# Patient Record
Sex: FEMALE | Race: WHITE | URBAN - METROPOLITAN AREA
[De-identification: names, ages, dates, MRNs, and addresses within clinical notes are randomized per-mention and may not be internally consistent; named-entity substitution may affect disease eponyms.]

---

## 2017-01-09 ENCOUNTER — EMERGENCY (EMERGENCY)
Facility: HOSPITAL | Age: 82
LOS: 1 days | Discharge: HOME | End: 2017-01-09
Admitting: EMERGENCY MEDICINE
Payer: MEDICARE

## 2017-01-09 DIAGNOSIS — R42 DIZZINESS AND GIDDINESS: ICD-10-CM

## 2017-01-09 DIAGNOSIS — I10 ESSENTIAL (PRIMARY) HYPERTENSION: ICD-10-CM

## 2017-01-09 DIAGNOSIS — Z79.02 LONG TERM (CURRENT) USE OF ANTITHROMBOTICS/ANTIPLATELETS: ICD-10-CM

## 2017-01-09 DIAGNOSIS — Z88.1 ALLERGY STATUS TO OTHER ANTIBIOTIC AGENTS STATUS: ICD-10-CM

## 2017-01-09 DIAGNOSIS — Z88.0 ALLERGY STATUS TO PENICILLIN: ICD-10-CM

## 2017-01-09 PROCEDURE — 93005 ELECTROCARDIOGRAM TRACING: CPT

## 2017-01-09 PROCEDURE — 99284 EMERGENCY DEPT VISIT MOD MDM: CPT | Mod: 25

## 2017-01-09 PROCEDURE — 70496 CT ANGIOGRAPHY HEAD: CPT

## 2017-01-09 PROCEDURE — 99285 EMERGENCY DEPT VISIT HI MDM: CPT

## 2017-01-09 PROCEDURE — 85610 PROTHROMBIN TIME: CPT

## 2017-01-09 PROCEDURE — 80048 BASIC METABOLIC PNL TOTAL CA: CPT

## 2017-01-09 PROCEDURE — 82550 ASSAY OF CK (CPK): CPT

## 2017-01-09 PROCEDURE — 71010: CPT | Mod: 26

## 2017-01-09 PROCEDURE — 71045 X-RAY EXAM CHEST 1 VIEW: CPT

## 2017-01-09 PROCEDURE — 82553 CREATINE MB FRACTION: CPT

## 2017-01-09 PROCEDURE — 84484 ASSAY OF TROPONIN QUANT: CPT

## 2017-01-09 PROCEDURE — 96360 HYDRATION IV INFUSION INIT: CPT | Mod: XU

## 2017-01-09 PROCEDURE — 70498 CT ANGIOGRAPHY NECK: CPT | Mod: 26

## 2017-01-09 PROCEDURE — 85027 COMPLETE CBC AUTOMATED: CPT

## 2017-01-09 PROCEDURE — 70450 CT HEAD/BRAIN W/O DYE: CPT

## 2017-01-09 PROCEDURE — 70498 CT ANGIOGRAPHY NECK: CPT

## 2017-01-09 PROCEDURE — 93010 ELECTROCARDIOGRAM REPORT: CPT

## 2017-01-09 PROCEDURE — 96361 HYDRATE IV INFUSION ADD-ON: CPT

## 2017-01-09 PROCEDURE — 70496 CT ANGIOGRAPHY HEAD: CPT | Mod: 26

## 2017-01-09 PROCEDURE — 70450 CT HEAD/BRAIN W/O DYE: CPT | Mod: 26,59

## 2017-01-09 PROCEDURE — 85730 THROMBOPLASTIN TIME PARTIAL: CPT

## 2017-01-24 ENCOUNTER — APPOINTMENT (OUTPATIENT)
Dept: MRI IMAGING | Facility: HOSPITAL | Age: 82
End: 2017-01-24

## 2017-08-10 ENCOUNTER — OUTPATIENT (OUTPATIENT)
Dept: OUTPATIENT SERVICES | Facility: HOSPITAL | Age: 82
LOS: 1 days | End: 2017-08-10
Payer: MEDICARE

## 2017-08-10 ENCOUNTER — APPOINTMENT (OUTPATIENT)
Dept: MAMMOGRAPHY | Facility: HOSPITAL | Age: 82
End: 2017-08-10
Payer: MEDICARE

## 2017-08-10 DIAGNOSIS — Z12.31 ENCOUNTER FOR SCREENING MAMMOGRAM FOR MALIGNANT NEOPLASM OF BREAST: ICD-10-CM

## 2017-08-10 PROCEDURE — 77063 BREAST TOMOSYNTHESIS BI: CPT

## 2017-08-10 PROCEDURE — 77063 BREAST TOMOSYNTHESIS BI: CPT | Mod: 26

## 2017-08-10 PROCEDURE — G0202: CPT | Mod: 26

## 2017-08-10 PROCEDURE — 77067 SCR MAMMO BI INCL CAD: CPT

## 2017-08-16 ENCOUNTER — APPOINTMENT (OUTPATIENT)
Dept: MAMMOGRAPHY | Facility: HOSPITAL | Age: 82
End: 2017-08-16
Payer: MEDICARE

## 2017-08-16 ENCOUNTER — APPOINTMENT (OUTPATIENT)
Dept: ULTRASOUND IMAGING | Facility: HOSPITAL | Age: 82
End: 2017-08-16
Payer: MEDICARE

## 2017-08-16 ENCOUNTER — OUTPATIENT (OUTPATIENT)
Dept: OUTPATIENT SERVICES | Facility: HOSPITAL | Age: 82
LOS: 1 days | End: 2017-08-16
Payer: MEDICARE

## 2017-08-16 DIAGNOSIS — R92.2 INCONCLUSIVE MAMMOGRAM: ICD-10-CM

## 2017-08-16 PROCEDURE — G0206: CPT | Mod: 26,RT

## 2017-08-16 PROCEDURE — 77065 DX MAMMO INCL CAD UNI: CPT

## 2017-08-16 PROCEDURE — G0279: CPT | Mod: 26

## 2017-08-16 PROCEDURE — G0279: CPT

## 2018-05-16 ENCOUNTER — OUTPATIENT (OUTPATIENT)
Dept: OUTPATIENT SERVICES | Facility: HOSPITAL | Age: 83
LOS: 1 days | End: 2018-05-16
Payer: MEDICARE

## 2018-05-16 ENCOUNTER — APPOINTMENT (OUTPATIENT)
Dept: RADIOLOGY | Facility: HOSPITAL | Age: 83
End: 2018-05-16

## 2018-05-16 DIAGNOSIS — R05 COUGH: ICD-10-CM

## 2018-05-16 DIAGNOSIS — Z00.8 ENCOUNTER FOR OTHER GENERAL EXAMINATION: ICD-10-CM

## 2018-05-16 DIAGNOSIS — J98.4 OTHER DISORDERS OF LUNG: ICD-10-CM

## 2018-05-16 PROCEDURE — 71046 X-RAY EXAM CHEST 2 VIEWS: CPT | Mod: 26

## 2018-05-16 PROCEDURE — 71046 X-RAY EXAM CHEST 2 VIEWS: CPT

## 2018-07-07 ENCOUNTER — APPOINTMENT (OUTPATIENT)
Dept: ULTRASOUND IMAGING | Facility: HOSPITAL | Age: 83
End: 2018-07-07

## 2018-07-14 ENCOUNTER — APPOINTMENT (OUTPATIENT)
Dept: ULTRASOUND IMAGING | Facility: HOSPITAL | Age: 83
End: 2018-07-14

## 2018-07-26 ENCOUNTER — APPOINTMENT (OUTPATIENT)
Dept: CT IMAGING | Facility: HOSPITAL | Age: 83
End: 2018-07-26

## 2019-09-26 ENCOUNTER — OUTPATIENT (OUTPATIENT)
Dept: OUTPATIENT SERVICES | Facility: HOSPITAL | Age: 84
LOS: 1 days | End: 2019-09-26
Payer: MEDICARE

## 2019-09-26 ENCOUNTER — APPOINTMENT (OUTPATIENT)
Dept: RADIOLOGY | Facility: HOSPITAL | Age: 84
End: 2019-09-26
Payer: MEDICARE

## 2019-09-26 DIAGNOSIS — Z00.8 ENCOUNTER FOR OTHER GENERAL EXAMINATION: ICD-10-CM

## 2019-09-26 PROCEDURE — 71046 X-RAY EXAM CHEST 2 VIEWS: CPT | Mod: 26

## 2019-09-26 PROCEDURE — 71046 X-RAY EXAM CHEST 2 VIEWS: CPT

## 2023-03-27 ENCOUNTER — INPATIENT (INPATIENT)
Facility: HOSPITAL | Age: 88
LOS: 0 days | Discharge: ANOTHER HOSPITAL | DRG: 536 | End: 2023-03-28
Attending: INTERNAL MEDICINE | Admitting: HOSPITALIST
Payer: MEDICARE

## 2023-03-27 VITALS
SYSTOLIC BLOOD PRESSURE: 161 MMHG | HEART RATE: 76 BPM | RESPIRATION RATE: 28 BRPM | OXYGEN SATURATION: 88 % | DIASTOLIC BLOOD PRESSURE: 94 MMHG

## 2023-03-27 DIAGNOSIS — M84.459A PATHOLOGICAL FRACTURE, HIP, UNSPECIFIED, INITIAL ENCOUNTER FOR FRACTURE: ICD-10-CM

## 2023-03-27 DIAGNOSIS — I48.21 PERMANENT ATRIAL FIBRILLATION: ICD-10-CM

## 2023-03-27 DIAGNOSIS — Z01.818 ENCOUNTER FOR OTHER PREPROCEDURAL EXAMINATION: ICD-10-CM

## 2023-03-27 PROCEDURE — 86901 BLOOD TYPING SEROLOGIC RH(D): CPT

## 2023-03-27 PROCEDURE — 70450 CT HEAD/BRAIN W/O DYE: CPT | Mod: 26,MH

## 2023-03-27 PROCEDURE — 72125 CT NECK SPINE W/O DYE: CPT | Mod: 26,MH

## 2023-03-27 PROCEDURE — 85610 PROTHROMBIN TIME: CPT

## 2023-03-27 PROCEDURE — 36415 COLL VENOUS BLD VENIPUNCTURE: CPT

## 2023-03-27 PROCEDURE — 93010 ELECTROCARDIOGRAM REPORT: CPT

## 2023-03-27 PROCEDURE — 93306 TTE W/DOPPLER COMPLETE: CPT

## 2023-03-27 PROCEDURE — 85027 COMPLETE CBC AUTOMATED: CPT

## 2023-03-27 PROCEDURE — 99285 EMERGENCY DEPT VISIT HI MDM: CPT

## 2023-03-27 PROCEDURE — 80048 BASIC METABOLIC PNL TOTAL CA: CPT

## 2023-03-27 PROCEDURE — 73502 X-RAY EXAM HIP UNI 2-3 VIEWS: CPT | Mod: 26,RT

## 2023-03-27 PROCEDURE — 99223 1ST HOSP IP/OBS HIGH 75: CPT

## 2023-03-27 PROCEDURE — 86850 RBC ANTIBODY SCREEN: CPT

## 2023-03-27 PROCEDURE — 86900 BLOOD TYPING SEROLOGIC ABO: CPT

## 2023-03-27 PROCEDURE — 73552 X-RAY EXAM OF FEMUR 2/>: CPT | Mod: 26,RT

## 2023-03-27 PROCEDURE — 71045 X-RAY EXAM CHEST 1 VIEW: CPT | Mod: 26

## 2023-03-27 PROCEDURE — 85730 THROMBOPLASTIN TIME PARTIAL: CPT

## 2023-03-27 NOTE — ED ADULT TRIAGE NOTE - CHIEF COMPLAINT QUOTE
Pt arrives to ED s/p unwitnessed fall at [facility] living. Staff heard patient fall. No LOC. takes plavix daily. complains of right hip pain. trauma alert called. Hx dementia.

## 2023-03-27 NOTE — H&P ADULT - ASSESSMENT
87F w/PMH afib, dementia (AAOx2), presents from AL after fall and inability to ambulate d/t pain of Rt hip.    In ED, found w/ fracture, d/w ortho, planned for surgery. Na 130. Rt hem/hip xray Impacted right subcapital femoral fracture, cxr Gross heart enlargement again noted  O2 88% on RA, now on 2LNC    #Acute hypoxic resp failure- unclear etiology, pt cannot give reliable hx, unable to ask pt if she's sob, although she appears mildly labored when speaking  - in setting of cardiomegaly on cxr, otherwise clear, murmur on exam  - monitor O2 sats and titrate to keep >92%  - check TTE  - check BNP  - albuterol inh  Q6H prn, first dose now  - cardio cs for clearance    #Hyponatremia- mild  - suspect med induced  - check am labs    #Mechanical fall w/ subsequent Rt hip fracture  - ortho cs appreciated- planned for surgery, consented by pt's niece  -bedrest, supportive care  - prn tylenol for pain, pt is comfortable at this time  - will need above work up before can medically clear.     #PPX- lovenox- hold prior to surgery

## 2023-03-27 NOTE — CONSULT NOTE ADULT - SUBJECTIVE AND OBJECTIVE BOX
87y Female demented [facility] presents c/o Right hip fx and inability to ambulate sp mechanical trip and fall. The pt had immediate severe and constant pain to the hip after the fall and was not able to bear weight or ambualte. No alleviating factors including medication or positioning. At baseline uses a Walker . Assisted Living called EMS and were BIBA to the ED where xray imaging was obtained. + HS. Denies numbness/tingling. Denies fever/chills. Denies pain/injury elsewhere. Orthopedics was asked to evaluate and manage the hip fracture. Pt seen and examined by Orthopedic Team: Residents and PAs.    ROS: No CP, no SOB, No night sweats, no fevers or chills, no Numbness or tingling. No pain in the hip prior to the fall.    MEWS Score    Migraine    Atrial Fibrillation    Hypertension    GERD (Gastroesophageal Reflux Disease)    History of Cataract Surgery    Ovarian Cyst    Breast Cyst    FALL    90+    [email or web address]      PAST MEDICAL & SURGICAL HISTORY:  Migraine      Atrial Fibrillation      Hypertension      GERD (Gastroesophageal Reflux Disease)      History of Cataract Surgery  right 2006      Ovarian Cyst      Breast Cyst        MEDICATIONS  (STANDING):    Allergies    penicillin (Anaphylaxis)  Zithromax (Hives)    Intolerances                            14.2   4.21  )-----------( 149      ( 27 Mar 2023 10:38 )             42.7     27 Mar 2023 10:38    130    |  98     |  24     ----------------------------<  149    3.6     |  26     |  0.85     Ca    9.7        27 Mar 2023 10:38    TPro  7.9    /  Alb  3.9    /  TBili  0.7    /  DBili  x      /  AST  28     /  ALT  28     /  AlkPhos  73     27 Mar 2023 10:38    PT/INR - ( 27 Mar 2023 10:38 )   PT: 12.9 sec;   INR: 1.11 ratio         PTT - ( 27 Mar 2023 10:38 )  PTT:28.6 sec  Vital Signs Last 24 Hrs  T(C): --  T(F): --  HR: 76 (03-27-23 @ 11:10) (76 - 76)  BP: 161/94 (03-27-23 @ 11:10) (161/94 - 161/94)  BP(mean): --  RR: 28 (03-27-23 @ 11:10) (28 - 28)  SpO2: 88% (03-27-23 @ 11:10) (88% - 88%)    Orthopedic Imaging: XR demonstrates Right vs Left hip fracture    Physical Exam  General: NAD, Awake but confused, follows some commands for exam  Neurologic: No gross deficits, moving all 4s.  Head: NCAT without abrasions, lacerations, or ecchymosis to head, face, or scalp  Eyes: PERRL  Neck/C-Spine: Painless FAROM. No bony TTP.  T/L Spine: No bony TTP    HIPS and PELVIS: Unable to SLR right Hip.     RIGHT LE: No open skin. Rotated and shortened. No deformities, no signs of trauma, no bony TTP at knee, lower leg, ankle or foot. Full baseline painless ROM at ankle and toes with. Unable to actively SLR. SILT toes 1-5. + DP/PT pulses palpable with brisk cap refill distally. Compartments soft and compressible.     LEFT LE:  No deformities, no signs of trauma, no bony TTP at knee, lower leg, ankle or foot. Full baseline painless ROM at ankle and toes with. Negative log-roll and heel strike. Able to actively SLR. SILT toes 1-5. + DP/PT pulses palpable with brisk cap refill distally. Compartments soft and compressible.     RIGHT UE:  Able to actively raise arm up overhead, flex/extend elbows and wrists. No open skin. Painless baseline AROM Shoulder, elbow, wrist, digits. No obvious deformities or other signs of trauma at shoulder, upper arm, elbow, forearm, wrist or hand.  No bony TTP to shoulder, elbow, wrist or digits.     LEFT UE: Able to actively raise arm up overhead, flex/extend elbows and wrists. No open skin. Painless baseline AROM Shoulder, elbow, wrist, digits. No obvious deformities or other signs of trauma at shoulder, upper arm, elbow, forearm, wrist or hand. No bony TTP  to shoulder, elbow, wrist or digits.     A/P: 87y Female with Right Femoral neck fracture  Pt will need surgery Hip Hemiarthroplasty versus total hip arthroplasty. This was discussed with the patient and/or family  -Surgical consent for prcedure obtained via telephone from family  -EKG on chart  -Keep NPO  -Pain control  -Bedrest  -DVT PE ppx  -F/U Additional imaging/CT  -Admit to medical team  -Medical preop risk stratification  -Nature of the fracture and the plan was discussed/illustrated to the pt and family at bedside  -Discussing above with Ortho Attending   **INCOMPLETE***  **INCOMPLETE***  **INCOMPLETE***  **INCOMPLETE***  
HPI:  87F w/PMH afib, dementia (AAOx2), presents from AL after fall and inability to ambulate d/t pain of Rt hip.  Per notes, pt was alone, but staff heard her fall and immediately came to see her and did not find any LOC.  Pt knows her name and that she's in the hospital, tells me she's here d/t coughing.  When I ask her if she recalls falling, she then states "yes, i think that was yesterday" and endorses that she's been able to walk after that.  Pt is comfortable at this time. She's very hard of hearing and I had to communicate w/ her by writing on paper to explain that she's having surgery for broken hip.  Pt's niece, is NOK, consented to pt's surgery.  Pt's  also has dementia.      In ED, found w/ fracture, d/w ortho, planned for surgery. Na 130. Rt hem/hip xray Impacted right subcapital femoral fracture. cxr Gross heart enlargement again noted  O2 88% on RA, now on 2LNC    consulted for preop clearance.  Pt hard of hearing  history difficult, denies chest pain, dyspnea, reports able to   walk 2 flights of stairs.      PAST MEDICAL AND SURGICAL HISTORY:  PAST MEDICAL & SURGICAL HISTORY:  Migraine      Atrial Fibrillation      Hypertension      GERD (Gastroesophageal Reflux Disease)      History of Cataract Surgery  right 2006      Ovarian Cyst      Breast Cyst          ALLERGIES:  Allergies    penicillin (Anaphylaxis)  Zithromax (Hives)    Intolerances        SOCIAL HISTORY:  Social History:      FAMILY  HISTORY:  FAMILY HISTORY:      MEDICATIONS:  OUTPATIENT:  Home Medications:      INPATIENT:  MEDICATIONS  (STANDING):    MEDICATIONS  (PRN):    MEDICATIONS  (PRN):      REVIEW OF SYSTEMS:  ===============================  ===============================  CONSTITUTIONAL: No weakness, fevers or chills  EYES/ENT: No visual changes;  No vertigo or throat pain   NECK: No pain or stiffness  RESPIRATORY: No cough, wheezing, hemoptysis; No shortness of breath  CARDIOVASCULAR: No chest pain or palpitations  GASTROINTESTINAL: No abdominal or epigastric pain. No nausea, vomiting, or hematemesis;   No diarrhea or constipation. No melena or hematochezia.  GENITOURINARY: No dysuria, frequency or hematuria  NEUROLOGICAL: No numbness or weakness  SKIN: No itching, burning, rashes, or lesions   All other review of systems is negative unless indicated above    Vital Signs Last 24 Hrs  T(C): --  T(F): --  HR: 76 (27 Mar 2023 11:10) (76 - 76)  BP: 161/94 (27 Mar 2023 11:10) (161/94 - 161/94)  BP(mean): --  RR: 28 (27 Mar 2023 11:10) (28 - 28)  SpO2: 88% (27 Mar 2023 11:10) (88% - 88%)    Parameters below as of 27 Mar 2023 11:10  Patient On (Oxygen Delivery Method): room air        I&O's Summary      I&O's Detail      PHYSICAL EXAM:    Constitutional: NAD, awake  HEENT: PERR, EOMI,  No oral cyananosis.  Neck:  supple,  No JVD  Respiratory: Breath sounds are clear bilaterally, No wheezing, rales or rhonchi  Cardiovascular: S1 and S2, regular rate and rhythm, + systolic murmur, no gallops or rubs  Gastrointestinal: Bowel Sounds present, soft, nontender.   Extremities: No peripheral edema. No clubbing or cyanosis.  Vascular: 2+ peripheral pulses  Neurological: A/O x 3, no focal deficits  Musculoskeletal: no calf tenderness.  Skin: No rashes.    ===============================  ===============================  LABS:                         14.2   4.21  )-----------( 149      ( 27 Mar 2023 10:38 )             42.7     27 Mar 2023 10:38    130    |  98     |  24     ----------------------------<  149    3.6     |  26     |  0.85     Ca    9.7        27 Mar 2023 10:38    TPro  7.9    /  Alb  3.9    /  TBili  0.7    /  DBili  x      /  AST  28     /  ALT  28     /  AlkPhos  73     27 Mar 2023 10:38    PT/INR - ( 27 Mar 2023 10:38 )   PT: 12.9 sec;   INR: 1.11 ratio         PTT - ( 27 Mar 2023 10:38 )  PTT:28.6 sec    THYROID STUDIES:  ===============================  ===============================  EKG:  Afib no ischemic changes.      ===============================    Adiel Eid M.D.  Cardiology, [facility]  Cell: [phone]  Office:   [phone] ([facility] Office)  [phone] ([facility] Office)    ===============================

## 2023-03-27 NOTE — CONSULT NOTE ADULT - PROBLEM SELECTOR RECOMMENDATION 9
-systolic murmur on exam.  -poor historian.  -ECG unremarkable    -Echo prior to surgery.  If normal EF & no RWMA  pt is cleared.

## 2023-03-27 NOTE — H&P ADULT - HISTORY OF PRESENT ILLNESS
HPI:  87F w/PMH afib, dementia (AAOx2), presents from AL after fall and inability to ambulate d/t pain of Rt hip.  Per notes, pt was alone, but staff heard her fall and immediately came to see her and did not find any LOC.  Pt knows her name and that she's in the hospital, tells me she's here d/t coughing.  When I ask her if she recalls falling, she then states "yes, i think that was yesterday" and endorses that she's been able to walk after that.  Pt is comfortable at this time. She's very hard of hearing and I had to communicate w/ her by writing on paper to explain that she's having surgery for broken hip.  Pt's niece, is NOK, consented to pt's surgery.  Pt's  also has dementia.      In ED, found w/ fracture, d/w ortho, planned for surgery. Na 130. Rt hem/hip xray Impacted right subcapital femoral fracture. cxr Gross heart enlargement again noted  O2 88% on RA, now on 2LNC    PAST MEDICAL & SURGICAL HISTORY:  Migraine  Atrial Fibrillation  Hypertension  GERD (Gastroesophageal Reflux Disease)  History of Cataract Surgery right 2006  Ovarian Cyst  Breast Cyst      Review of Systems:   cannot assess d/t pt's dementia       Social History:   from [place]    FAMILY HISTORY:  cannot assess d/t pt's dementia       MEDICATIONS  (STANDING):  see written orders    MEDICATIONS  (PRN):  see written order        PHYSICAL EXAM:  Vital Signs Last 24 Hrs  T(C): --  T(F): --  HR: 76 (27 Mar 2023 11:10) (76 - 76)  BP: 161/94 (27 Mar 2023 11:10) (161/94 - 161/94)  BP(mean): --  RR: 28 (27 Mar 2023 11:10) (28 - 28)  SpO2: 88% (27 Mar 2023 11:10) (88% - 88%)  Parameters below as of 27 Mar 2023 11:10  Patient On (Oxygen Delivery Method): 2LNC  GENERAL: NAD, follows commands, can read when I communicate w/ her in writing  HEAD:  Atraumatic, Normocephalic  EYES: EOMI, PERRLA, conjunctiva and sclera clear  ENT: very hard of hearing, no nasal discharge, throat clear, dentition normal  NECK: Supple, No JVD, no LAD, no thyromegaly   CHEST/LUNG: Clear to auscultation bilaterally; No wheeze, respirations unlabored, ++milldy labored w/ conversation  HEART: Regular rate and rhythm; +MIRTHA III/VI  ABDOMEN: Soft, Nontender, Nondistended; Bowel sounds present, no HSM  EXTREMITIES:  2+ Peripheral Pulses, No clubbing, cyanosis, or edema  PSYCH: AAOx2 normal behavior  NEUROLOGY: non-focal, sensory and cn 2-12 intact, speech/language intact  SKIN: No visible rashes or lesions    LABS:                        14.2   4.21  )-----------( 149      ( 27 Mar 2023 10:38 )             42.7     03-27    130<L>  |  98  |  24<H>  ----------------------------<  149<H>  3.6   |  26  |  0.85    Ca    9.7      27 Mar 2023 10:38    TPro  7.9  /  Alb  3.9  /  TBili  0.7  /  DBili  x   /  AST  28  /  ALT  28  /  AlkPhos  73  03-27    PT/INR - ( 27 Mar 2023 10:38 )   PT: 12.9 sec;   INR: 1.11 ratio         PTT - ( 27 Mar 2023 10:38 )  PTT:28.6 sec          RADIOLOGY & ADDITIONAL TESTS:    Imaging Personally Reviewed:  Rt hem/hip xray Impacted right subcapital femoral fracture    cxr Gross heart enlargement again noted    EKG Personally Reviewed:    Consultant(s) Notes Reviewed:    ortho  Care Discussed with Consultants/Other Providers:  ortho resident- planned for surgery, consent from Shannen GOSS

## 2023-03-28 ENCOUNTER — TRANSCRIPTION ENCOUNTER (OUTPATIENT)
Age: 88
End: 2023-03-28

## 2023-03-28 ENCOUNTER — INPATIENT (INPATIENT)
Facility: HOSPITAL | Age: 88
LOS: 2 days | Discharge: NURSING, REHAB OR LONG-TERM CARE | DRG: 481 | End: 2023-03-31
Attending: HOSPITALIST | Admitting: HOSPITALIST
Payer: MEDICARE

## 2023-03-28 VITALS
SYSTOLIC BLOOD PRESSURE: 151 MMHG | DIASTOLIC BLOOD PRESSURE: 81 MMHG | OXYGEN SATURATION: 99 % | HEART RATE: 70 BPM | RESPIRATION RATE: 15 BRPM | HEIGHT: 62 IN | WEIGHT: 127.65 LBS | TEMPERATURE: 98 F

## 2023-03-28 VITALS
DIASTOLIC BLOOD PRESSURE: 73 MMHG | RESPIRATION RATE: 18 BRPM | TEMPERATURE: 98 F | SYSTOLIC BLOOD PRESSURE: 114 MMHG | OXYGEN SATURATION: 94 % | HEART RATE: 68 BPM

## 2023-03-28 DIAGNOSIS — S72.001A FRACTURE OF UNSPECIFIED PART OF NECK OF RIGHT FEMUR, INITIAL ENCOUNTER FOR CLOSED FRACTURE: ICD-10-CM

## 2023-03-28 DIAGNOSIS — I27.20 PULMONARY HYPERTENSION, UNSPECIFIED: ICD-10-CM

## 2023-03-28 DIAGNOSIS — Z29.9 ENCOUNTER FOR PROPHYLACTIC MEASURES, UNSPECIFIED: ICD-10-CM

## 2023-03-28 DIAGNOSIS — Z87.81 PERSONAL HISTORY OF (HEALED) TRAUMATIC FRACTURE: Chronic | ICD-10-CM

## 2023-03-28 DIAGNOSIS — I10 ESSENTIAL (PRIMARY) HYPERTENSION: ICD-10-CM

## 2023-03-28 DIAGNOSIS — I48.20 CHRONIC ATRIAL FIBRILLATION, UNSPECIFIED: ICD-10-CM

## 2023-03-28 DIAGNOSIS — F03.90 UNSPECIFIED DEMENTIA, UNSPECIFIED SEVERITY, WITHOUT BEHAVIORAL DISTURBANCE, PSYCHOTIC DISTURBANCE, MOOD DISTURBANCE, AND ANXIETY: ICD-10-CM

## 2023-03-28 DIAGNOSIS — R91.8 OTHER NONSPECIFIC ABNORMAL FINDING OF LUNG FIELD: ICD-10-CM

## 2023-03-28 LAB
ABO RH CONFIRMATION: SIGNIFICANT CHANGE UP
ANION GAP SERPL CALC-SCNC: 6 MMOL/L — SIGNIFICANT CHANGE UP (ref 5–17)
APTT BLD: 31.7 SEC — SIGNIFICANT CHANGE UP (ref 27.5–35.5)
BLD GP AB SCN SERPL QL: SIGNIFICANT CHANGE UP
BUN SERPL-MCNC: 17 MG/DL — SIGNIFICANT CHANGE UP (ref 7–23)
CALCIUM SERPL-MCNC: 9.4 MG/DL — SIGNIFICANT CHANGE UP (ref 8.5–10.1)
CHLORIDE SERPL-SCNC: 102 MMOL/L — SIGNIFICANT CHANGE UP (ref 96–108)
CO2 SERPL-SCNC: 23 MMOL/L — SIGNIFICANT CHANGE UP (ref 22–31)
CREAT SERPL-MCNC: 0.68 MG/DL — SIGNIFICANT CHANGE UP (ref 0.5–1.3)
EGFR: 85 ML/MIN/1.73M2 — SIGNIFICANT CHANGE UP
GLUCOSE SERPL-MCNC: 129 MG/DL — HIGH (ref 70–99)
HCT VFR BLD CALC: 40.7 % — SIGNIFICANT CHANGE UP (ref 34.5–45)
HGB BLD-MCNC: 13.7 G/DL — SIGNIFICANT CHANGE UP (ref 11.5–15.5)
INR BLD: 1.19 RATIO — HIGH (ref 0.88–1.16)
MCHC RBC-ENTMCNC: 30.2 PG — SIGNIFICANT CHANGE UP (ref 27–34)
MCHC RBC-ENTMCNC: 33.7 GM/DL — SIGNIFICANT CHANGE UP (ref 32–36)
MCV RBC AUTO: 89.8 FL — SIGNIFICANT CHANGE UP (ref 80–100)
PLATELET # BLD AUTO: 123 K/UL — LOW (ref 150–400)
POTASSIUM SERPL-MCNC: 3.5 MMOL/L — SIGNIFICANT CHANGE UP (ref 3.5–5.3)
POTASSIUM SERPL-SCNC: 3.5 MMOL/L — SIGNIFICANT CHANGE UP (ref 3.5–5.3)
PROTHROM AB SERPL-ACNC: 13.8 SEC — HIGH (ref 10.5–13.4)
RBC # BLD: 4.53 M/UL — SIGNIFICANT CHANGE UP (ref 3.8–5.2)
RBC # FLD: 13.7 % — SIGNIFICANT CHANGE UP (ref 10.3–14.5)
SODIUM SERPL-SCNC: 131 MMOL/L — LOW (ref 135–145)
WBC # BLD: 8.35 K/UL — SIGNIFICANT CHANGE UP (ref 3.8–10.5)
WBC # FLD AUTO: 8.35 K/UL — SIGNIFICANT CHANGE UP (ref 3.8–10.5)

## 2023-03-28 PROCEDURE — 99239 HOSP IP/OBS DSCHRG MGMT >30: CPT

## 2023-03-28 PROCEDURE — 99233 SBSQ HOSP IP/OBS HIGH 50: CPT

## 2023-03-28 PROCEDURE — 99223 1ST HOSP IP/OBS HIGH 75: CPT

## 2023-03-28 PROCEDURE — 93306 TTE W/DOPPLER COMPLETE: CPT | Mod: 26

## 2023-03-28 RX ORDER — ENOXAPARIN SODIUM 100 MG/ML
40 INJECTION SUBCUTANEOUS ONCE
Refills: 0 | Status: COMPLETED | OUTPATIENT
Start: 2023-03-28 | End: 2023-03-28

## 2023-03-28 RX ORDER — SERTRALINE 25 MG/1
1 TABLET, FILM COATED ORAL
Qty: 0 | Refills: 0 | DISCHARGE
Start: 2023-03-28

## 2023-03-28 RX ORDER — LOSARTAN POTASSIUM 100 MG/1
100 TABLET, FILM COATED ORAL DAILY
Refills: 0 | Status: DISCONTINUED | OUTPATIENT
Start: 2023-03-29 | End: 2023-03-29

## 2023-03-28 RX ORDER — ACETAMINOPHEN 500 MG
2 TABLET ORAL
Qty: 0 | Refills: 0 | DISCHARGE
Start: 2023-03-28

## 2023-03-28 RX ORDER — LOSARTAN POTASSIUM 100 MG/1
100 TABLET, FILM COATED ORAL DAILY
Refills: 0 | Status: DISCONTINUED | OUTPATIENT
Start: 2023-03-28 | End: 2023-03-28

## 2023-03-28 RX ORDER — ENOXAPARIN SODIUM 100 MG/ML
40 INJECTION SUBCUTANEOUS EVERY 24 HOURS
Refills: 0 | Status: DISCONTINUED | OUTPATIENT
Start: 2023-03-28 | End: 2023-03-28

## 2023-03-28 RX ORDER — ACETAMINOPHEN 500 MG
650 TABLET ORAL EVERY 6 HOURS
Refills: 0 | Status: DISCONTINUED | OUTPATIENT
Start: 2023-03-28 | End: 2023-03-29

## 2023-03-28 RX ORDER — SERTRALINE 25 MG/1
50 TABLET, FILM COATED ORAL DAILY
Refills: 0 | Status: DISCONTINUED | OUTPATIENT
Start: 2023-03-29 | End: 2023-03-29

## 2023-03-28 RX ORDER — IPRATROPIUM/ALBUTEROL SULFATE 18-103MCG
3 AEROSOL WITH ADAPTER (GRAM) INHALATION EVERY 6 HOURS
Refills: 0 | Status: DISCONTINUED | OUTPATIENT
Start: 2023-03-28 | End: 2023-03-29

## 2023-03-28 RX ORDER — ONDANSETRON 8 MG/1
4 TABLET, FILM COATED ORAL EVERY 8 HOURS
Refills: 0 | Status: DISCONTINUED | OUTPATIENT
Start: 2023-03-28 | End: 2023-03-28

## 2023-03-28 RX ORDER — CLOPIDOGREL BISULFATE 75 MG/1
1 TABLET, FILM COATED ORAL
Qty: 0 | Refills: 0 | DISCHARGE
Start: 2023-03-28

## 2023-03-28 RX ORDER — ALBUTEROL 90 UG/1
1 AEROSOL, METERED ORAL
Refills: 0 | Status: DISCONTINUED | OUTPATIENT
Start: 2023-03-28 | End: 2023-03-28

## 2023-03-28 RX ORDER — CLOPIDOGREL BISULFATE 75 MG/1
75 TABLET, FILM COATED ORAL DAILY
Refills: 0 | Status: DISCONTINUED | OUTPATIENT
Start: 2023-03-28 | End: 2023-03-28

## 2023-03-28 RX ORDER — LOSARTAN POTASSIUM 100 MG/1
1 TABLET, FILM COATED ORAL
Qty: 0 | Refills: 0 | DISCHARGE
Start: 2023-03-28

## 2023-03-28 RX ORDER — SERTRALINE 25 MG/1
50 TABLET, FILM COATED ORAL DAILY
Refills: 0 | Status: DISCONTINUED | OUTPATIENT
Start: 2023-03-28 | End: 2023-03-28

## 2023-03-28 RX ORDER — ACETAMINOPHEN 500 MG
650 TABLET ORAL EVERY 6 HOURS
Refills: 0 | Status: DISCONTINUED | OUTPATIENT
Start: 2023-03-28 | End: 2023-03-28

## 2023-03-28 RX ADMIN — ENOXAPARIN SODIUM 40 MILLIGRAM(S): 100 INJECTION SUBCUTANEOUS at 17:32

## 2023-03-28 NOTE — H&P ADULT - NSHPSOURCEINFOTX_GEN_ALL_CORE
History from patient not reliable due to moderate cognitive impairment. History from patient not reliable due to moderate cognitive impairment.  Next of kin, Bernadette Ramiro, [phone] History from patient not reliable due to moderate cognitive impairment.  Next of kin, Bernadette Rubio, [phone] contacted by me with partial history reviewed.

## 2023-03-28 NOTE — H&P ADULT - PROBLEM SELECTOR PLAN 1
Upgraded to telemetry.   Per niece, patient has not been on full AC (unclear if from fall risk?).   Would clarify patient's Plavix Rx in the AM.    Would consider formal Cardiology evaluation in the AM.

## 2023-03-28 NOTE — H&P ADULT - NSICDXPASTMEDICALHX_GEN_ALL_CORE_FT
SUBJECTIVE:                                                            Katty Hayward is a 79 year old female who presents for Preventive Visit.    Sleeping (lack of)- Patient reports she tosses and turns when laying in bed.  She gets up to go to bathroom and can't sleep and does other things until eventually its morning. She relates she falls asleep during the day and sleeps more during the day than at night. When she is in bed at night her legs constantly wiggle and are restless. Patient states this has been going on since winter of last year. Patient is using Mirapex but says it helps minimally. Patient has taken Gabapentin before for her sleeping problems and reports no reactions with it. She relates going to bed around midnight every night but wiggles all night with itchy/restless legs.   Irritable Bowel Syndrome (IBS)- Patient reports having abdominal pain and has experienced diarrhea since winter of last year. She notes she has not tried any medications for her IBS. She notes her stool was pure liquid prior to arriving to the clinic. She gets nervous but doesn't attribute her IBS to her nervousness. Patient relates stools twice a day, mostly after she eats. She has never seen a GI doctor. She denies cramping or pain with her bowel movements. Patient denies fever, chills, or blood in her stool as well.  Memory- Patient reports not being able to remember things frequently. Patient denies getting lost when she is driving her car. Patient also denies depression or falls.    Are you in the first 12 months of your Medicare Part B coverage?  No    Healthy Habits:    Do you get at least three servings of calcium containing foods daily (dairy, green leafy vegetables, etc.)? yes    Amount of exercise or daily activities, outside of work: 2 day(s) per week going to the gym - 20 minutes     Problems taking medications regularly No    Medication side effects: No    Have you had an eye exam in the past two years?  yes    Do you see a dentist twice per year? yes    Do you have sleep apnea, excessive snoring or daytime drowsiness?no    COGNITIVE SCREEN  1) Repeat 3 items (Banana, Sunrise, Chair)    2) Clock draw: ABNORMAL hands in the wrong place   3) 3 item recall: Recalls 2 objects   Results: ABNORMAL clock, 1-2 items recalled: PROBABLE COGNITIVE IMPAIRMENT, **INFORM PROVIDER**    Mini-CogTM Copyright DANTE Mark. Licensed by the author for use in [facility]; reprinted with permission (khurram@[facility]). All rights reserved.        Sleep problems  Irritable bowel   Memory concern     Reviewed and updated as needed this visit by clinical staff  Tobacco  Allergies  Meds  Med Hx  Surg Hx  Fam Hx  Soc Hx        Reviewed and updated as needed this visit by Provider        Social History   Substance Use Topics     Smoking status: Former Smoker     Packs/day: 1.00     Types: Cigarettes     Quit date: 4/22/1996     Smokeless tobacco: Never Used      Comment: STOPPED [id number]     Alcohol use Yes      Comment: SELDOM       The patient does not drink >3 drinks per day nor >7 drinks per week.    Today's PHQ-2 Score:   PHQ-2 ( 1999 Pf[employer]) 6/5/2017 5/14/2014   Q1: Little interest or pleasure in doing things 0 0   Q2: Feeling down, depressed or hopeless 1 0   PHQ-2 Score 1 0       Do you feel safe in your environment - Yes    Do you have a Health Care Directive?: No: Advance care planning was reviewed with patient; patient declined at this time.    Current providers sharing in care for this patient include:   Patient Care Team:  Weiler, Karen, MD as PCP - General (Family Practice)      Hearing impairment: No    Ability to successfully perform activities of daily living: Yes, no assistance needed     Fall risk:  Fallen 2 or more times in the past year?: No  Any fall with injury in the past year?: No    Home safety:  none identified      The following health maintenance items are reviewed in Epic and correct as of  "today:  Health Maintenance   Topic Date Due     PNEUMOCOCCAL (2 of 2 - PCV13) 05/12/2011     MEDICARE ANNUAL WELLNESS VISIT  05/14/2015     WELLNESS VISIT Q1 YR  05/14/2015     DEXA Q3 YR  05/01/2016     COPD ACTION PLAN Q1 YR  05/05/2016     FALL RISK ASSESSMENT  05/13/2016     ADVANCE DIRECTIVE PLANNING Q5 YRS  08/05/2016     LIPID MONITORING Q1 YEAR  05/25/2017     MICROALBUMIN Q1 YEAR  05/25/2017     INFLUENZA VACCINE (SYSTEM ASSIGNED)  09/01/2017     TETANUS IMMUNIZATION (SYSTEM ASSIGNED)  11/04/2018     SPIROMETRY ONETIME  Completed         Pneumonia Vaccine:Adults age 65+ who received Pneumovax (PPSV23) at 65 years or older: Should be given PCV13 > 1 year after their most recent PPSV23     ROS:  Constitutional, HEENT, cardiovascular, pulmonary, gi and gu systems are negative, except as otherwise noted.    This document serves as a record of the services and decisions personally performed and made by Karen Weiler, MD. It was created on her behalf by Drew Boyle, a trained medical scribe. The creation of this document is based on the provider's statements to the medical scribe.  Drew Boyle 1:25 PM June 5, 2017    Problem list, Medication list, Allergies, and Medical/Social/Surgical histories reviewed in [employer] and updated as appropriate.  OBJECTIVE:                                                            /80  Pulse 86  Temp 98.4  F (36.9  C) (Oral)  Ht 1.775 m (5' 9.9\")  Wt 62.6 kg (138 lb)  SpO2 95%  BMI 19.86 kg/m2 Estimated body mass index is 19.86 kg/(m^2) as calculated from the following:    Height as of this encounter: 1.775 m (5' 9.9\").    Weight as of this encounter: 62.6 kg (138 lb).  EXAM:   GENERAL: healthy, alert and no distress  EYES: Eyes grossly normal to inspection, PERRL and conjunctivae and sclerae normal  HENT: ear canals and TM's normal, nose and mouth without ulcers or lesions  NECK: no adenopathy, no asymmetry, masses, or scars and thyroid normal to palpation  RESP: lungs " clear to auscultation - no rales, rhonchi or wheezes  CV: regular rate and rhythm, normal S1 S2, no S3 or S4, no murmur, click or rub, no peripheral edema and peripheral pulses strong  ABDOMEN: soft, nontender, no hepatosplenomegaly, no masses and bowel sounds normal  MS: no gross musculoskeletal defects noted, no edema  SKIN: no suspicious lesions or rashes  NEURO: Normal strength and tone, mentation intact and speech normal  PSYCH: mentation appears normal, affect normal/bright    ASSESSMENT / PLAN:                                                              (Z00.00) Medicare annual wellness visit, subsequent  (primary encounter diagnosis)      (R19.5) Loose stools  Comment: Will check cultures.  Discussed with patient taking imodium before eating meals to help with her IBS.  Plan: Enteric Bacteria and Virus Panel by NATHANIEL Stool,         Clostridium difficile Toxin B PCR        Follow up as needed if symptoms persist or if they worsen.    (Z13.6) CARDIOVASCULAR SCREENING; LDL GOAL LESS THAN 130  Comment: Meds refilled. Discussed with patient symptoms cares.  Plan: Follow up as needed.    (J44.9) Chronic obstructive pulmonary disease, unspecified COPD type (H)  Comment: Meds refilled. Stable.   Plan: Lipid panel reflex to direct LDL, predniSONE         (DELTASONE) 10 MG tablet, levofloxacin         (LEVAQUIN) 500 MG tablet        Follow up as needed.    (R41.3) Memory problem  Comment: Will check labs. May consider Neurology referral for additional testing.   Plan: Vitamin B12, Folate, TSH with free T4 reflex        Follow up as needed if symptoms persist or worsen.    (G25.81) Restless leg syndrome  Comment: Patient desired to discontinue clonazepam and start taking gabapentin for her restless legs and to help her sleep at night without interruption. Also recommended checking iron levels to see if this is causing her legs to be restless.  Plan: CBC with platelets and differential,         Comprehensive metabolic  "panel (BMP + Alb, Alk         Phos, ALT, AST, Total. Bili, TP), Iron and iron        binding capacity, pramipexole (MIRAPEX) 0.25 MG        tablet, gabapentin (NEURONTIN) 300 MG capsule        Follow up as needed if symptoms worsen.    (I10) Essential hypertension with goal blood pressure less than 140/90  Comment: BP under good control today.  Meds refilled.  Plan: losartan-hydrochlorothiazide (HYZAAR) 50-12.5         MG per tablet        Follow up as needed.    (F41.9) Anxiety  Comment: Stable.. Meds refilled.  Plan: citalopram (CELEXA) 20 MG tablet        Follow up as needed    (K21.0) Reflux esophagitis  Comment: Meds refilled.  Plan: omeprazole (PRILOSEC) 20 MG CR capsule        Follow up as needed.    (Z23) Need for vaccination with 13-polyvalent pneumococcal conjugate vaccin  Plan: PNEUMOCOCCAL CONJ VACCINE 13 VALENT IM    End of Life Planning:  Patient currently has an advanced directive: Yes.  Practitioner is supportive of decision.    COUNSELING:  Reviewed preventive health counseling, as reflected in patient instructions       Regular exercise       Healthy diet/nutrition       Dental care       Immunizations    Vaccinated for: Pneumococcal          Estimated body mass index is 19.86 kg/(m^2) as calculated from the following:    Height as of this encounter: 1.775 m (5' 9.9\").    Weight as of this encounter: 62.6 kg (138 lb).  Weight management plan noted, stable and monitoring   reports that she quit smoking about 21 years ago. Her smoking use included Cigarettes. She smoked 1.00 pack per day. She has never used smokeless tobacco.      Appropriate preventive services were discussed with this patient, including applicable screening as appropriate for cardiovascular disease, diabetes, osteopenia/osteoporosis, and glaucoma.  As appropriate for age/gender, discussed screening for colorectal cancer, prostate cancer, breast cancer, and cervical cancer. Checklist reviewing preventive services available has been " given to the patient.    Reviewed patients plan of care and provided an AVS. The Basic Care Plan (routine screening as documented in Health Maintenance) for Katty meets the Care Plan requirement. This Care Plan has been established and reviewed with the Patient.    Counseling Resources:  ATP IV Guidelines  Pooled Cohorts Equation Calculator  Breast Cancer Risk Calculator  FRAX Risk Assessment  ICSI Preventive Guidelines  Dietary Guidelines for Americans, 2010  [employer]'s MyPlate  ASA Prophylaxis  Lung CA Screening    The information in this document, created by the medical scribe for me, accurately reflects the services I personally performed and the decisions made by me. I have reviewed and approved this document for accuracy prior to leaving the patient care area.  June 5, 2017 1:26 PM    Karen Weiler, MD  [facility]   PAST MEDICAL HISTORY:  Atrial Fibrillation     GERD (Gastroesophageal Reflux Disease)     Hypertension     Migraine     Pulmonary hypertension

## 2023-03-28 NOTE — H&P ADULT - REASON FOR ADMISSION
Transferred per orthopaedics to [place] from [facility] S/P presumed mechanical fall and RIGHT hip fracture

## 2023-03-28 NOTE — DISCHARGE NOTE PROVIDER - NSDCMRMEDTOKEN_GEN_ALL_CORE_FT
acetaminophen 325 mg oral tablet: 2 tab(s) orally every 6 hours As needed Temp greater or equal to 38.5C (101.3F)  clopidogrel 75 mg oral tablet: 1 tab(s) orally once a day  losartan 100 mg oral tablet: 1 tab(s) orally once a day  sertraline 50 mg oral tablet: 1 tab(s) orally once a day

## 2023-03-28 NOTE — H&P ADULT - PROBLEM SELECTOR PLAN 4
See above.  Non weight bearing.   I have contacted orthopaedics of patient's transfer from [place].

## 2023-03-28 NOTE — H&P ADULT - NSHPSOCIALHISTORY_GEN_ALL_CORE
Niece is not sure, but no history of primary tobacco use.  NO ethanol.   Moderate cognitive impairment, resident of assisted living with spouse, also with cognitive impairment.

## 2023-03-28 NOTE — H&P ADULT - PROBLEM SELECTOR PLAN 7
Patient received Lovenox at [facility] at 5PM.  Ortho requesting to temporarily HOLD the Lovenox with clarification of OR time.

## 2023-03-28 NOTE — H&P ADULT - HISTORY OF PRESENT ILLNESS
NIGHT HOSPITALIST:    Patient UNKNOWN to me previously, assigned to me at this point via Dr. Davila of orthopaedics to accept transfer for this 88 y/o F--transferred from [facility]--history from patient not reliable and partially obtained from patient's niece (Next of Kin) Bernadette Rubio, [phone]--patient with a history of moderate cognitive impairment resident of an assisted living facility in [facility] who lives with her spouse (apparently who also has cognitive impairment) with patient with a presumed mechanical fall at the assisted living with patient admitted to [facility] on 3/27/23.   Patient with a history of chronic atrial fibrillation apparently not on chronic full anticoagulation, on Plavix for unclear reasons, with moderate cognitive impairment with poor B/L hearing (patient, per niece, has refused to use hearing assist devices) with patient maintained on sertraline for depression, with patient complaint at [facility] of coughing.    Patient seen by Adiel Eid MD of cardiology in [facility] for cardiac preoperative evaluation with echo today with severe MR, TR and severe LA dilatation and severe pulmonary HTN.  CTT head negative at [facility] but CTT cervical spine with incidental view of 1.2 cm RUL apical nodule, with recommendation for CTT chest. with patient now transferred to [facility] due to their concern for patient as an operative risk.   Patient removed patient's LUE IV access with resolved bleeding following compress.   Patient is alert to self/hospital, but with poor insight.  Patient denies RIGHT hip pain.  NO chest pain but not reliable for review of cardiac systems.   Patient requiring supplemental O2.

## 2023-03-28 NOTE — PROGRESS NOTE ADULT - PROBLEM SELECTOR PLAN 2
Problem: Permanent atrial fibrillation.   ·  Recommendation: OK to hold anticoag 48 hrs prior to surgery restart when safe postop.  Cont. rate controlling meds.

## 2023-03-28 NOTE — DISCHARGE NOTE PROVIDER - CARE PROVIDER_API CALL
Davey Davila (MD)  Orthopaedic Surgery  [address] [place] [address], [address]  [place] [address]  Phone: [phone]  Fax: [phone]  Follow Up Time: 1-3 days

## 2023-03-28 NOTE — H&P ADULT - NSICDXPASTSURGICALHX_GEN_ALL_CORE_FT
PAST SURGICAL HISTORY:  Breast Cyst     History of Cataract Surgery right 2006    History of fracture of right hip     Ovarian Cyst

## 2023-03-28 NOTE — PROGRESS NOTE ADULT - SUBJECTIVE AND OBJECTIVE BOX
HPI:  87F w/PMH afib, dementia (AAOx2), presents from AL after fall and inability to ambulate d/t pain of Rt hip.  Per notes, pt was alone, but staff heard her fall and immediately came to see her and did not find any LOC.  Pt knows her name and that she's in the hospital, tells me she's here d/t coughing.  When I ask her if she recalls falling, she then states "yes, i think that was yesterday" and endorses that she's been able to walk after that.  Pt is comfortable at this time. She's very hard of hearing and I had to communicate w/ her by writing on paper to explain that she's having surgery for broken hip.  Pt's niece, is NOK, consented to pt's surgery.  Pt's  also has dementia.      In ED, found w/ fracture, d/w ortho, planned for surgery. Na 130. Rt hem/hip xray Impacted right subcapital femoral fracture. cxr Gross heart enlargement again noted  O2 88% on RA, now on 2LNC    consulted for preop clearance.  Pt hard of hearing  history difficult, denies chest pain, dyspnea, reports able to   walk 2 flights of stairs.      PAST MEDICAL AND SURGICAL HISTORY:  PAST MEDICAL & SURGICAL HISTORY:  Migraine      Atrial Fibrillation      Hypertension      GERD (Gastroesophageal Reflux Disease)      History of Cataract Surgery  right 2006      Ovarian Cyst      Breast Cyst          ALLERGIES:  Allergies    penicillin (Anaphylaxis)  Zithromax (Hives)    Intolerances        SOCIAL HISTORY:  Social History:      FAMILY  HISTORY:  FAMILY HISTORY:      MEDICATIONS:  OUTPATIENT:  Home Medications:    MEDICATIONS  (STANDING):  clopidogrel Tablet 75 milliGRAM(s) Oral daily  losartan 100 milliGRAM(s) Oral daily  sertraline 50 milliGRAM(s) Oral daily    MEDICATIONS  (PRN):  acetaminophen     Tablet .. 650 milliGRAM(s) Oral every 6 hours PRN Temp greater or equal to 38.5C (101.3F)  albuterol    90 MICROgram(s) HFA Inhaler 1 Puff(s) Inhalation four times a day PRN Shortness of Breath and/or Wheezing  ondansetron   Disintegrating Tablet 4 milliGRAM(s) Oral every 8 hours PRN Nausea and/or Vomiting    Vital Signs Last 24 Hrs  T(C): 36.4 (28 Mar 2023 08:00), Max: 36.4 (28 Mar 2023 08:00)  T(F): 97.5 (28 Mar 2023 08:00), Max: 97.5 (28 Mar 2023 08:00)  HR: 70 (28 Mar 2023 08:00) (70 - 70)  BP: 151/81 (28 Mar 2023 08:00) (151/81 - 151/81)  BP(mean): --  RR: 15 (28 Mar 2023 08:00) (15 - 15)  SpO2: 99% (28 Mar 2023 08:00) (99% - 99%)    Parameters below as of 28 Mar 2023 08:00  Patient On (Oxygen Delivery Method): nasal cannula  O2 Flow (L/min): 3        I&O's Summary      I&O's Detail      PHYSICAL EXAM:    Constitutional: NAD, awake  HEENT: PERR, EOMI,  No oral cyananosis.  Neck:  supple,  No JVD  Respiratory: Breath sounds are clear bilaterally, No wheezing, rales or rhonchi  Cardiovascular: S1 and S2, regular rate and rhythm, + systolic murmur, no gallops or rubs  Gastrointestinal: Bowel Sounds present, soft, nontender.   Extremities: No peripheral edema. No clubbing or cyanosis.  Vascular: 2+ peripheral pulses  Neurological: A/O x 3, no focal deficits  Musculoskeletal: no calf tenderness.  Skin: No rashes.    ===============================  ===============================  LABS:                         14.2   4.21  )-----------( 149      ( 27 Mar 2023 10:38 )             42.7     27 Mar 2023 10:38    130    |  98     |  24     ----------------------------<  149    3.6     |  26     |  0.85     Ca    9.7        27 Mar 2023 10:38    TPro  7.9    /  Alb  3.9    /  TBili  0.7    /  DBili  x      /  AST  28     /  ALT  28     /  AlkPhos  73     27 Mar 2023 10:38    PT/INR - ( 27 Mar 2023 10:38 )   PT: 12.9 sec;   INR: 1.11 ratio         PTT - ( 27 Mar 2023 10:38 )  PTT:28.6 sec    THYROID STUDIES:  ===============================  ===============================  EKG:  Afib no ischemic changes.      < from: TTE Echo Complete w/o Contrast w/ Doppler (03.28.23 @ 10:39) >   Impression     Summary     Moderate mitral annular calcification is present.   The mitral valve leaflets appear thickened.   Moderate to Severe mitral regurgitation is present.   The aortic valve is well visualized, appears calcified. Valve opening   seems to be normal.   Mild (1+) aortic regurgitation is present.   Severe (4+) tricuspid valve regurgitation is present.   Severe pulmonary hypertension.   Trace to mild pulmonic valvular regurgitation is present.   The left atrium is severely dilated.   The left ventricle is normal in size, wall motion and contractility.   Mild concentric left ventricular hypertrophy is present.   Estimated left ventricular ejection fraction is 65%.   The right atrium appears severely dilated.   Normal appearing right ventricle structure and function.     Signature     ----------------------------------------------------------------   Electronically signed by Mariam Lunsford MD(Interpreting   physician) on 03/28/2023 12:31 PM    < end of copied text >   HPI:  87F w/PMH afib, dementia (AAOx2), presents from AL after fall and inability to ambulate d/t pain of Rt hip.  Per notes, pt was alone, but staff heard her fall and immediately came to see her and did not find any LOC.  Pt knows her name and that she's in the hospital, tells me she's here d/t coughing.  When I ask her if she recalls falling, she then states "yes, i think that was yesterday" and endorses that she's been able to walk after that.  Pt is comfortable at this time. She's very hard of hearing and I had to communicate w/ her by writing on paper to explain that she's having surgery for broken hip.  Pt's niece, is NOK, consented to pt's surgery.  Pt's  also has dementia.      In ED, found w/ fracture, d/w ortho, planned for surgery. Na 130. Rt hem/hip xray Impacted right subcapital femoral fracture. cxr Gross heart enlargement again noted  O2 88% on RA, now on 2LNC    consulted for preop clearance.  Pt hard of hearing  history difficult, denies chest pain, dyspnea, reports able to   walk 2 flights of stairs.    3/28/23: Patient awake confused.  Niece at bedside.  Echo results show NL LVF, NL wall motion with severe TR, severe pul HTN, mod-severe MR      PAST MEDICAL AND SURGICAL HISTORY:  PAST MEDICAL & SURGICAL HISTORY:  Migraine      Atrial Fibrillation      Hypertension      GERD (Gastroesophageal Reflux Disease)      History of Cataract Surgery  right 2006      Ovarian Cyst      Breast Cyst          ALLERGIES:  Allergies    penicillin (Anaphylaxis)  Zithromax (Hives)    Intolerances        SOCIAL HISTORY:  Social History:      FAMILY  HISTORY:  FAMILY HISTORY:      MEDICATIONS:  OUTPATIENT:  Home Medications:    MEDICATIONS  (STANDING):  clopidogrel Tablet 75 milliGRAM(s) Oral daily  losartan 100 milliGRAM(s) Oral daily  sertraline 50 milliGRAM(s) Oral daily    MEDICATIONS  (PRN):  acetaminophen     Tablet .. 650 milliGRAM(s) Oral every 6 hours PRN Temp greater or equal to 38.5C (101.3F)  albuterol    90 MICROgram(s) HFA Inhaler 1 Puff(s) Inhalation four times a day PRN Shortness of Breath and/or Wheezing  ondansetron   Disintegrating Tablet 4 milliGRAM(s) Oral every 8 hours PRN Nausea and/or Vomiting    Vital Signs Last 24 Hrs  T(C): 36.4 (28 Mar 2023 08:00), Max: 36.4 (28 Mar 2023 08:00)  T(F): 97.5 (28 Mar 2023 08:00), Max: 97.5 (28 Mar 2023 08:00)  HR: 70 (28 Mar 2023 08:00) (70 - 70)  BP: 151/81 (28 Mar 2023 08:00) (151/81 - 151/81)  BP(mean): --  RR: 15 (28 Mar 2023 08:00) (15 - 15)  SpO2: 99% (28 Mar 2023 08:00) (99% - 99%)    Parameters below as of 28 Mar 2023 08:00  Patient On (Oxygen Delivery Method): nasal cannula  O2 Flow (L/min): 3        I&O's Summary      I&O's Detail      PHYSICAL EXAM:    Constitutional: NAD, awake  HEENT: PERR, EOMI,  No oral cyananosis.  Neck:  supple,  No JVD  Respiratory: Breath sounds are clear bilaterally, No wheezing, rales or rhonchi  Cardiovascular: S1 and S2, regular rate and rhythm, + systolic murmur, no gallops or rubs  Gastrointestinal: Bowel Sounds present, soft, nontender.   Extremities: No peripheral edema. No clubbing or cyanosis.  Vascular: 2+ peripheral pulses  Neurological: A/O x 3, no focal deficits  Musculoskeletal: no calf tenderness.  Skin: No rashes.    ===============================  ===============================  LABS:                                    13.7   8.35  )-----------( 123      ( 28 Mar 2023 09:23 )             40.7     03-28    131<L>  |  102  |  17  ----------------------------<  129<H>  3.5   |  23  |  0.68    Ca    9.4      28 Mar 2023 09:23    TPro  7.9  /  Alb  3.9  /  TBili  0.7  /  DBili  x   /  AST  28  /  ALT  28  /  AlkPhos  73  03-27      27 Mar 2023 10:38    130    |  98     |  24     ----------------------------<  149    3.6     |  26     |  0.85     Ca    9.7        27 Mar 2023 10:38    TPro  7.9    /  Alb  3.9    /  TBili  0.7    /  DBili  x      /  AST  28     /  ALT  28     /  AlkPhos  73     27 Mar 2023 10:38    PT/INR - ( 27 Mar 2023 10:38 )   PT: 12.9 sec;   INR: 1.11 ratio         PTT - ( 27 Mar 2023 10:38 )  PTT:28.6 sec    THYROID STUDIES:  ===============================  ===============================  EKG:  Afib no ischemic changes.      < from: TTE Echo Complete w/o Contrast w/ Doppler (03.28.23 @ 10:39) >   Impression     Summary     Moderate mitral annular calcification is present.   The mitral valve leaflets appear thickened.   Moderate to Severe mitral regurgitation is present.   The aortic valve is well visualized, appears calcified. Valve opening   seems to be normal.   Mild (1+) aortic regurgitation is present.   Severe (4+) tricuspid valve regurgitation is present.   Severe pulmonary hypertension.   Trace to mild pulmonic valvular regurgitation is present.   The left atrium is severely dilated.   The left ventricle is normal in size, wall motion and contractility.   Mild concentric left ventricular hypertrophy is present.   Estimated left ventricular ejection fraction is 65%.   The right atrium appears severely dilated.   Normal appearing right ventricle structure and function.     Signature     ----------------------------------------------------------------   Electronically signed by Mariam Lunsford MD(Interpreting   physician) on 03/28/2023 12:31 PM    < end of copied text >

## 2023-03-28 NOTE — H&P ADULT - NSHPLABSRESULTS_GEN_ALL_CORE
EKG tracing interpreted by me with Q V1V2, atrial fibrillation at 61.    Chest radiograph interpreted by me with cardiomegaly, no acute infiltrate or effusion.    Labs from [facility] from 3/28/23:    WBC 8.3 normal differential from 3/27/23    Hgb 13.7    Platelets of 123K    INR 1.1    Random glucose of 129    Cr 0.6    K+ 3.5    COVID-19 and RVP >>negative.    CTT head and cervical spine negative but incidental 1.2 cm RUL apical nodule on CTT neck view.    Pelvis radiograph with RIGHT hip fracture

## 2023-03-28 NOTE — H&P ADULT - PROBLEM SELECTOR PLAN 6
English
See above.  CTT chest no contrast.    Results of finding from [facility] reviewed with patient's niece.    Would consider formal pulmonary evaluation in the AM.

## 2023-03-28 NOTE — PROGRESS NOTE ADULT - PROBLEM SELECTOR PLAN 1
Problem: Preoperative clearance.   ·  Recommendation: -systolic murmur on exam.  -poor historian.  -ECG unremarkable    -Echo prior to surgery.  If normal EF & no RWMA  pt is cleared. Problem: Preoperative clearance.   ·  Recommendation: -systolic murmur on exam.  -poor historian.  -ECG unremarkable  -Echo shows normal LVF, NL wall motion with severe TR and severe pulmonary HTN.  Anesthesia diverted to tertiary care center for OR.  Pt is high risk for OR due to severe pulmonary HTN.  Pt being transferred to [facility] for further care

## 2023-03-28 NOTE — H&P ADULT - ASSESSMENT
NIGHT HOSPITALIST:     Transferred from [place] to [place] per ortho in the setting of patient with a presumed mechanical fall with RIGHT hip fracture in the setting of patient with severe pulmonary HTN, mild O2 requirements with bronchospasm and undifferentiated RUL lung mass with chronic atrial fibrillation not on full AC, unclear to why patient is on Plavix (would clarify in the AM) with moderate cognitive impairment.    Will transfer to telemetry for proper monitoring of patient's chronic atrial fibrillation.    I have contacted orthopaedics of patient's transfer.   Would have formal cardiology and pulmonary evaluation in the AM.    Will temporarily HOLD the Lovenox (received dose at [place] at 5PM) until clarified for patient's OR time.    Will continue with Zoloft.  Aspiration precautions.   Enhanced supervision.    On Losartan for essential HTN.    Will obtain a noncontrast CTT chest to evaluate lung mass.

## 2023-03-28 NOTE — H&P ADULT - MENTAL STATUS
Alert, oriented to person/hospital.   Speech fluent but poor short term memory.  Patient poorly follows commands and easily distracted.

## 2023-03-28 NOTE — H&P ADULT - PROBLEM SELECTOR PLAN 2
See above.   Element of bronchospasm will provide Duoneb therapy and supplemental O2.    Would consider formal pulmonary evaluation in the AM.

## 2023-03-28 NOTE — DISCHARGE NOTE PROVIDER - HOSPITAL COURSE
Patient is 86yo/F presented s/p mechanical fall at AL facility resultant in RT hip fracture. During the work up found to have severe pulmonary HTn and valvular heart disease, pt is high risk for OR and will be transferred to [facility] for further treatment and surgery.

## 2023-03-28 NOTE — H&P ADULT - PROBLEM SELECTOR PLAN 3
See above.   Niece reports that the Zoloft has controlled patient's history of depression and stabilized her moderate cognitive impairment, but patient's refusal for hearing assist devices complicating her moderate cognitive impairment.  Enhanced supervision.   Aspiration precautions.

## 2023-03-28 NOTE — H&P ADULT - CONSTITUTIONAL COMMENTS
Elderly, thin, chronically ill appearing F.  Alert oriented to person/ hospital.   Poor short term recall.  Desultory content.

## 2023-03-28 NOTE — PROGRESS NOTE ADULT - SUBJECTIVE AND OBJECTIVE BOX
CC-  Rt hip fracture (27 Mar 2023 18:37)    HPI:  87F w/PMH chronic afib, dementia (AAOx2), presents from AL after fall and inability to ambulate d/t pain of Rt hip.  Per notes, pt was alone, but staff heard her fall and immediately came to see her and did not find any LOC.  Pt knows her name and that she's in the hospital, tells me she's here d/t coughing.  When I ask her if she recalls falling, she then states "yes, i think that was yesterday" and endorses that she's been able to walk after that.  Pt is comfortable at this time. She's very hard of hearing and I had to communicate w/ her by writing on paper to explain that she's having surgery for broken hip.  Pt's niece, is WOLFGANG, consented to pt's surgery.  Pt's  also has dementia.      In ED, found w/ fracture, d/w ortho, planned for surgery. Na 130. Rt hem/hip xray Impacted right subcapital femoral fracture. cxr Gross heart enlargement again noted  O2 88% on RA, now on 2LNC    3/28/23- very [facility], somewhat confused. Comfortable    Review of system- All 10 systems reviewed and is as per HPI otherwise negative.     T(C): --  HR: --  BP: --  RR: --  SpO2: --  Wt(kg): --    LABS:                        13.7   8.35  )-----------( 123      ( 28 Mar 2023 09:23 )             40.7     03-28    131<L>  |  102  |  17  ----------------------------<  129<H>  3.5   |  23  |  0.68    Ca    9.4      28 Mar 2023 09:23    TPro  7.9  /  Alb  3.9  /  TBili  0.7  /  DBili  x   /  AST  28  /  ALT  28  /  AlkPhos  73  03-27    PT/INR - ( 28 Mar 2023 09:23 )   PT: 13.8 sec;   INR: 1.19 ratio       PTT - ( 28 Mar 2023 09:23 )  PTT:31.7 sec    RADIOLOGY & ADDITIONAL TESTS:      PHYSICAL EXAM:  GENERAL: NAD, well-groomed, well-developed  HEAD:  Atraumatic, Normocephalic  EYES: EOMI, PERRLA, conjunctiva and sclera clear  HEENT: Moist mucous membranes  NECK: Supple, No JVD  NERVOUS SYSTEM:  Alert & Oriented X1, grossly non-focal  CHEST/LUNG: Clear to auscultation bilaterally; No rales, rhonchi, wheezing, or rubs  HEART: systolic murmur, irregular  ABDOMEN: Soft, Nontender, Nondistended; Bowel sounds present  GENITOURINARY- Voiding, no palpable bladder  EXTREMITIES:  2+ Peripheral Pulses, No clubbing, cyanosis, or edema  MUSCULOSKELTAL- RLE slightly rotated  SKIN-no rash, no lesion    acetaminophen     Tablet .. 650 milliGRAM(s) Oral every 6 hours PRN  albuterol    90 MICROgram(s) HFA Inhaler 1 Puff(s) Inhalation four times a day PRN  clopidogrel Tablet 75 milliGRAM(s) Oral daily  enoxaparin Injectable 40 milliGRAM(s) SubCutaneous every 24 hours  losartan 100 milliGRAM(s) Oral daily  ondansetron   Disintegrating Tablet 4 milliGRAM(s) Oral every 8 hours PRN  sertraline 50 milliGRAM(s) Oral daily    Assessment/Plan  #S/p mechanical fall with RT hip fracture  Ortho following  Bedrest  OR tomorrow  Pain meds prn  Lovenox x1 today then on hold for OR  ECHO- severe pulm HTN, EF- 65%, mod-sev MR, sev TR  Anesthesia diverted to tertiary care center for OR    #Valvular heart disease  #Chronic afib  On Plavix  Cardio eval noted    #Hyponatremia- mild    #Hypoxia likely poor finger readings of pulse ox  CXR is negative, clinically no crackles, no wheezing  Monitor on O2    #Patient is high risk for OR due to severe pulmonary HTN. Transfer to [facility] for further care. Transfer canter called and d/w hospitalist @[facility]. DC time 45 mins

## 2023-03-28 NOTE — DISCHARGE NOTE PROVIDER - NSDCCPCAREPLAN_GEN_ALL_CORE_FT
PRINCIPAL DISCHARGE DIAGNOSIS  Diagnosis: Hip fracture, right  Assessment and Plan of Treatment: OR @[facility] with DR Davila

## 2023-03-29 ENCOUNTER — TRANSCRIPTION ENCOUNTER (OUTPATIENT)
Age: 88
End: 2023-03-29

## 2023-03-29 DIAGNOSIS — Z01.818 ENCOUNTER FOR OTHER PREPROCEDURAL EXAMINATION: ICD-10-CM

## 2023-03-29 LAB
A1C WITH ESTIMATED AVERAGE GLUCOSE RESULT: 5.8 % — HIGH (ref 4–5.6)
ALBUMIN SERPL ELPH-MCNC: 4 G/DL — SIGNIFICANT CHANGE UP (ref 3.3–5)
ALP SERPL-CCNC: 72 U/L — SIGNIFICANT CHANGE UP (ref 40–120)
ALT FLD-CCNC: 23 U/L — SIGNIFICANT CHANGE UP (ref 10–45)
ANION GAP SERPL CALC-SCNC: 17 MMOL/L — SIGNIFICANT CHANGE UP (ref 5–17)
APTT BLD: 32.6 SEC — SIGNIFICANT CHANGE UP (ref 27.5–35.5)
AST SERPL-CCNC: 31 U/L — SIGNIFICANT CHANGE UP (ref 10–40)
BASOPHILS # BLD AUTO: 0 K/UL — SIGNIFICANT CHANGE UP (ref 0–0.2)
BASOPHILS NFR BLD AUTO: 0 % — SIGNIFICANT CHANGE UP (ref 0–2)
BILIRUB SERPL-MCNC: 1.3 MG/DL — HIGH (ref 0.2–1.2)
BUN SERPL-MCNC: 19 MG/DL — SIGNIFICANT CHANGE UP (ref 7–23)
CALCIUM SERPL-MCNC: 9.7 MG/DL — SIGNIFICANT CHANGE UP (ref 8.4–10.5)
CHLORIDE SERPL-SCNC: 98 MMOL/L — SIGNIFICANT CHANGE UP (ref 96–108)
CO2 SERPL-SCNC: 19 MMOL/L — LOW (ref 22–31)
CREAT SERPL-MCNC: 0.52 MG/DL — SIGNIFICANT CHANGE UP (ref 0.5–1.3)
EGFR: 91 ML/MIN/1.73M2 — SIGNIFICANT CHANGE UP
ELLIPTOCYTES BLD QL SMEAR: SLIGHT — SIGNIFICANT CHANGE UP
EOSINOPHIL # BLD AUTO: 0 K/UL — SIGNIFICANT CHANGE UP (ref 0–0.5)
EOSINOPHIL NFR BLD AUTO: 0 % — SIGNIFICANT CHANGE UP (ref 0–6)
ESTIMATED AVERAGE GLUCOSE: 120 MG/DL — HIGH (ref 68–114)
GLUCOSE SERPL-MCNC: 130 MG/DL — HIGH (ref 70–99)
HCT VFR BLD CALC: 39.1 % — SIGNIFICANT CHANGE UP (ref 34.5–45)
HGB BLD-MCNC: 12.7 G/DL — SIGNIFICANT CHANGE UP (ref 11.5–15.5)
INR BLD: 1.19 RATIO — HIGH (ref 0.88–1.16)
LYMPHOCYTES # BLD AUTO: 0.6 K/UL — LOW (ref 1–3.3)
LYMPHOCYTES # BLD AUTO: 7.8 % — LOW (ref 13–44)
MAGNESIUM SERPL-MCNC: 1.9 MG/DL — SIGNIFICANT CHANGE UP (ref 1.6–2.6)
MANUAL SMEAR VERIFICATION: SIGNIFICANT CHANGE UP
MCHC RBC-ENTMCNC: 29.3 PG — SIGNIFICANT CHANGE UP (ref 27–34)
MCHC RBC-ENTMCNC: 32.5 GM/DL — SIGNIFICANT CHANGE UP (ref 32–36)
MCV RBC AUTO: 90.1 FL — SIGNIFICANT CHANGE UP (ref 80–100)
MONOCYTES # BLD AUTO: 1.19 K/UL — HIGH (ref 0–0.9)
MONOCYTES NFR BLD AUTO: 15.5 % — HIGH (ref 2–14)
NEUTROPHILS # BLD AUTO: 5.88 K/UL — SIGNIFICANT CHANGE UP (ref 1.8–7.4)
NEUTROPHILS NFR BLD AUTO: 76.7 % — SIGNIFICANT CHANGE UP (ref 43–77)
PHOSPHATE SERPL-MCNC: 2.5 MG/DL — SIGNIFICANT CHANGE UP (ref 2.5–4.5)
PLAT MORPH BLD: NORMAL — SIGNIFICANT CHANGE UP
PLATELET # BLD AUTO: 93 K/UL — LOW (ref 150–400)
POIKILOCYTOSIS BLD QL AUTO: SIGNIFICANT CHANGE UP
POTASSIUM SERPL-MCNC: 3.7 MMOL/L — SIGNIFICANT CHANGE UP (ref 3.5–5.3)
POTASSIUM SERPL-SCNC: 3.7 MMOL/L — SIGNIFICANT CHANGE UP (ref 3.5–5.3)
PROT SERPL-MCNC: 6.9 G/DL — SIGNIFICANT CHANGE UP (ref 6–8.3)
PROTHROM AB SERPL-ACNC: 13.8 SEC — HIGH (ref 10.5–13.4)
RBC # BLD: 4.34 M/UL — SIGNIFICANT CHANGE UP (ref 3.8–5.2)
RBC # FLD: 13.9 % — SIGNIFICANT CHANGE UP (ref 10.3–14.5)
RBC BLD AUTO: ABNORMAL
SODIUM SERPL-SCNC: 134 MMOL/L — LOW (ref 135–145)
WBC # BLD: 7.66 K/UL — SIGNIFICANT CHANGE UP (ref 3.8–10.5)
WBC # FLD AUTO: 7.66 K/UL — SIGNIFICANT CHANGE UP (ref 3.8–10.5)

## 2023-03-29 PROCEDURE — 72192 CT PELVIS W/O DYE: CPT | Mod: 26

## 2023-03-29 PROCEDURE — 93010 ELECTROCARDIOGRAM REPORT: CPT

## 2023-03-29 PROCEDURE — 99223 1ST HOSP IP/OBS HIGH 75: CPT

## 2023-03-29 PROCEDURE — 73502 X-RAY EXAM HIP UNI 2-3 VIEWS: CPT | Mod: 26,RT

## 2023-03-29 PROCEDURE — 76377 3D RENDER W/INTRP POSTPROCES: CPT | Mod: 26

## 2023-03-29 PROCEDURE — 71250 CT THORAX DX C-: CPT | Mod: 26

## 2023-03-29 PROCEDURE — 99233 SBSQ HOSP IP/OBS HIGH 50: CPT

## 2023-03-29 DEVICE — SCREW ASNS 6.5X75 MM: Type: IMPLANTABLE DEVICE | Site: RIGHT HIP PINNING | Status: FUNCTIONAL

## 2023-03-29 DEVICE — SCREW CANN ASNIS LLL 6.5X80MM: Type: IMPLANTABLE DEVICE | Site: RIGHT HIP PINNING | Status: FUNCTIONAL

## 2023-03-29 RX ORDER — LOSARTAN POTASSIUM 100 MG/1
100 TABLET, FILM COATED ORAL DAILY
Refills: 0 | Status: DISCONTINUED | OUTPATIENT
Start: 2023-03-29 | End: 2023-03-31

## 2023-03-29 RX ORDER — IPRATROPIUM/ALBUTEROL SULFATE 18-103MCG
3 AEROSOL WITH ADAPTER (GRAM) INHALATION EVERY 6 HOURS
Refills: 0 | Status: DISCONTINUED | OUTPATIENT
Start: 2023-03-29 | End: 2023-03-31

## 2023-03-29 RX ORDER — TRAMADOL HYDROCHLORIDE 50 MG/1
50 TABLET ORAL EVERY 8 HOURS
Refills: 0 | Status: DISCONTINUED | OUTPATIENT
Start: 2023-03-29 | End: 2023-03-31

## 2023-03-29 RX ORDER — RIVAROXABAN 15 MG-20MG
10 KIT ORAL DAILY
Refills: 0 | Status: DISCONTINUED | OUTPATIENT
Start: 2023-03-30 | End: 2023-03-31

## 2023-03-29 RX ORDER — OXYCODONE HYDROCHLORIDE 5 MG/1
2.5 TABLET ORAL EVERY 8 HOURS
Refills: 0 | Status: DISCONTINUED | OUTPATIENT
Start: 2023-03-29 | End: 2023-03-31

## 2023-03-29 RX ORDER — LIDOCAINE 4 G/100G
1 CREAM TOPICAL DAILY
Refills: 0 | Status: DISCONTINUED | OUTPATIENT
Start: 2023-03-29 | End: 2023-03-29

## 2023-03-29 RX ORDER — ACETAMINOPHEN 500 MG
1000 TABLET ORAL ONCE
Refills: 0 | Status: COMPLETED | OUTPATIENT
Start: 2023-03-29 | End: 2023-03-29

## 2023-03-29 RX ORDER — SODIUM CHLORIDE 9 MG/ML
1000 INJECTION, SOLUTION INTRAVENOUS
Refills: 0 | Status: DISCONTINUED | OUTPATIENT
Start: 2023-03-29 | End: 2023-03-29

## 2023-03-29 RX ORDER — ONDANSETRON 8 MG/1
4 TABLET, FILM COATED ORAL ONCE
Refills: 0 | Status: DISCONTINUED | OUTPATIENT
Start: 2023-03-29 | End: 2023-03-31

## 2023-03-29 RX ORDER — SODIUM CHLORIDE 9 MG/ML
1000 INJECTION INTRAMUSCULAR; INTRAVENOUS; SUBCUTANEOUS
Refills: 0 | Status: DISCONTINUED | OUTPATIENT
Start: 2023-03-29 | End: 2023-03-31

## 2023-03-29 RX ORDER — HALOPERIDOL DECANOATE 100 MG/ML
1 INJECTION INTRAMUSCULAR ONCE
Refills: 0 | Status: COMPLETED | OUTPATIENT
Start: 2023-03-29 | End: 2023-03-29

## 2023-03-29 RX ORDER — TRAMADOL HYDROCHLORIDE 50 MG/1
25 TABLET ORAL EVERY 8 HOURS
Refills: 0 | Status: DISCONTINUED | OUTPATIENT
Start: 2023-03-29 | End: 2023-03-31

## 2023-03-29 RX ORDER — TRAMADOL HYDROCHLORIDE 50 MG/1
50 TABLET ORAL EVERY 4 HOURS
Refills: 0 | Status: DISCONTINUED | OUTPATIENT
Start: 2023-03-29 | End: 2023-03-29

## 2023-03-29 RX ORDER — HYDROMORPHONE HYDROCHLORIDE 2 MG/ML
0.25 INJECTION INTRAMUSCULAR; INTRAVENOUS; SUBCUTANEOUS
Refills: 0 | Status: DISCONTINUED | OUTPATIENT
Start: 2023-03-29 | End: 2023-03-30

## 2023-03-29 RX ORDER — SERTRALINE 25 MG/1
50 TABLET, FILM COATED ORAL DAILY
Refills: 0 | Status: DISCONTINUED | OUTPATIENT
Start: 2023-03-29 | End: 2023-03-31

## 2023-03-29 RX ORDER — ONDANSETRON 8 MG/1
4 TABLET, FILM COATED ORAL EVERY 6 HOURS
Refills: 0 | Status: DISCONTINUED | OUTPATIENT
Start: 2023-03-29 | End: 2023-03-31

## 2023-03-29 RX ORDER — ACETAMINOPHEN 500 MG
650 TABLET ORAL EVERY 6 HOURS
Refills: 0 | Status: DISCONTINUED | OUTPATIENT
Start: 2023-03-30 | End: 2023-03-31

## 2023-03-29 RX ADMIN — Medication 3 MILLILITER(S): at 17:55

## 2023-03-29 RX ADMIN — HALOPERIDOL DECANOATE 1 MILLIGRAM(S): 100 INJECTION INTRAMUSCULAR at 01:44

## 2023-03-29 RX ADMIN — Medication 100 MILLIGRAM(S): at 22:29

## 2023-03-29 RX ADMIN — Medication 1000 MILLIGRAM(S): at 23:10

## 2023-03-29 RX ADMIN — Medication 400 MILLIGRAM(S): at 22:11

## 2023-03-29 RX ADMIN — SODIUM CHLORIDE 85 MILLILITER(S): 9 INJECTION INTRAMUSCULAR; INTRAVENOUS; SUBCUTANEOUS at 17:53

## 2023-03-29 RX ADMIN — Medication 3 MILLILITER(S): at 00:49

## 2023-03-29 RX ADMIN — Medication 3 MILLILITER(S): at 23:42

## 2023-03-29 RX ADMIN — SODIUM CHLORIDE 85 MILLILITER(S): 9 INJECTION INTRAMUSCULAR; INTRAVENOUS; SUBCUTANEOUS at 22:11

## 2023-03-29 RX ADMIN — LOSARTAN POTASSIUM 100 MILLIGRAM(S): 100 TABLET, FILM COATED ORAL at 10:00

## 2023-03-29 NOTE — PRE-ANESTHESIA EVALUATION ADULT - NSANTHADDINFOFT_GEN_ALL_CORE
extensive rba dw niece via phone, including elevated risk ephraim-op morbidity/mortality given underlying medical condition, agrees with plan

## 2023-03-29 NOTE — CONSULT NOTE ADULT - SUBJECTIVE AND OBJECTIVE BOX
87y Female presents s/p [facility] fall c/o severe R hip pain and inability to ambulate. Patient is demented and A+Ox0-1 at baseline. Per chart, patient had a fall at her assisted living facility and the facility called EMS. Patient denies radiation of pain. Patient denies numbness/tingling/burning in the RLE. No other bone/joint complaints. Patient is a minimal ambulator at baseline with a walker.    PAST MEDICAL & SURGICAL HISTORY:  Migraine      Atrial Fibrillation      Hypertension      GERD (Gastroesophageal Reflux Disease)      Pulmonary hypertension      History of Cataract Surgery  right 2006      Ovarian Cyst      Breast Cyst      History of fracture of right hip        MEDICATIONS  (STANDING):  albuterol/ipratropium for Nebulization 3 milliLiter(s) Nebulizer every 6 hours  losartan 100 milliGRAM(s) Oral daily  sertraline 50 milliGRAM(s) Oral daily    MEDICATIONS  (PRN):  acetaminophen     Tablet .. 650 milliGRAM(s) Oral every 6 hours PRN Temp greater or equal to 38C (100.4F), Mild Pain (1 - 3)    Allergies    penicillin (Anaphylaxis)  Zithromax (Hives)    Intolerances                              13.7   8.35  )-----------( 123      ( 28 Mar 2023 09:23 )             40.7     03-28    131<L>  |  102  |  17  ----------------------------<  129<H>  3.5   |  23  |  0.68    Ca    9.4      28 Mar 2023 09:23    TPro  7.9  /  Alb  3.9  /  TBili  0.7  /  DBili  x   /  AST  28  /  ALT  28  /  AlkPhos  73  03-27    PT/INR - ( 28 Mar 2023 09:23 )   PT: 13.8 sec;   INR: 1.19 ratio         PTT - ( 28 Mar 2023 09:23 )  PTT:31.7 sec    T(C): 36.9 (03-29-23 @ 00:11), Max: 36.9 (03-29-23 @ 00:11)  HR: 77 (03-29-23 @ 00:11) (68 - 77)  BP: 125/71 (03-29-23 @ 00:11) (114/73 - 151/81)  RR: 18 (03-29-23 @ 00:11) (15 - 18)  SpO2: 96% (03-29-23 @ 00:11) (94% - 99%)  Wt(kg): --    PE   Gen: Confused, A+O x 1-2  RLE:  Skin intact; No ecchymosis/soft tissue swelling  Compartments soft; + TTP about hip. No TTP to knee/leg/ankle/foot   ROM lmited 2/2 pain   Unable to SLR; + Log Roll/Heel Strike  Motor intact GS/TA/FHL/EHL  SILT L2-S1  DP/PT pulses 2+    LLE/BUE:   No bony TTP; Good ROM w/o pain; Exam Unremarkable    Imaging:  XR demonstrating R femoral neck fracture     87y Female presents s/p [facility] fall c/o severe R hip pain and inability to ambulate. Patient is demented and A+Ox0-1 at baseline. Per chart, patient had a fall at her assisted living facility and the facility called EMS. Patient denies radiation of pain. Patient denies numbness/tingling/burning in the RLE. No other bone/joint complaints. Patient is a minimal ambulator at baseline with a walker. Patient was seen by orthopedics at [facility], who were prepared to offer her surgery for her hip fracture. After echocardiogram was performed, patient was transferred to [place] for higher level of care.     PAST MEDICAL & SURGICAL HISTORY:  Migraine      Atrial Fibrillation      Hypertension      GERD (Gastroesophageal Reflux Disease)      Pulmonary hypertension      History of Cataract Surgery  right 2006      Ovarian Cyst      Breast Cyst      History of fracture of right hip        MEDICATIONS  (STANDING):  albuterol/ipratropium for Nebulization 3 milliLiter(s) Nebulizer every 6 hours  losartan 100 milliGRAM(s) Oral daily  sertraline 50 milliGRAM(s) Oral daily    MEDICATIONS  (PRN):  acetaminophen     Tablet .. 650 milliGRAM(s) Oral every 6 hours PRN Temp greater or equal to 38C (100.4F), Mild Pain (1 - 3)    Allergies    penicillin (Anaphylaxis)  Zithromax (Hives)    Intolerances                              13.7   8.35  )-----------( 123      ( 28 Mar 2023 09:23 )             40.7     03-28    131<L>  |  102  |  17  ----------------------------<  129<H>  3.5   |  23  |  0.68    Ca    9.4      28 Mar 2023 09:23    TPro  7.9  /  Alb  3.9  /  TBili  0.7  /  DBili  x   /  AST  28  /  ALT  28  /  AlkPhos  73  03-27    PT/INR - ( 28 Mar 2023 09:23 )   PT: 13.8 sec;   INR: 1.19 ratio         PTT - ( 28 Mar 2023 09:23 )  PTT:31.7 sec    T(C): 36.9 (03-29-23 @ 00:11), Max: 36.9 (03-29-23 @ 00:11)  HR: 77 (03-29-23 @ 00:11) (68 - 77)  BP: 125/71 (03-29-23 @ 00:11) (114/73 - 151/81)  RR: 18 (03-29-23 @ 00:11) (15 - 18)  SpO2: 96% (03-29-23 @ 00:11) (94% - 99%)  Wt(kg): --    PE   Gen: Confused, A+O x 1-2  RLE:  Skin intact; No ecchymosis/soft tissue swelling  Compartments soft; + TTP about hip. No TTP to knee/leg/ankle/foot   ROM lmited 2/2 pain   Unable to SLR; + Log Roll/Heel Strike  Motor intact GS/TA/FHL/EHL  SILT L2-S1  DP/PT pulses 2+    LLE/BUE:   No bony TTP; Good ROM w/o pain; Exam Unremarkable    Imaging:  XR demonstrating R femoral neck fracture

## 2023-03-29 NOTE — CONSULT NOTE ADULT - REASON FOR ADMISSION
Transferred per orthopaedics to [place] from [facility] S/P presumed mechanical fall and RIGHT hip fracture
Transferred per orthopaedics to [place] from [facility] S/P presumed mechanical fall and RIGHT hip fracture
Transferred per orthopaedics to [place] from [facility] S/P presumed mechanical fall and RIGHT hip fracture

## 2023-03-29 NOTE — CONSULT NOTE ADULT - SUBJECTIVE AND OBJECTIVE BOX
DATE OF SERVICE: 03-29-23 @ 09:36    CHIEF COMPLAINT:Patient is a 87y old  Female who presents with a chief complaint of Transferred per orthopaedics to [facility] from [facility] S/P presumed mechanical fall and RIGHT hip fracture (29 Mar 2023 01:04)      HISTORY OF PRESENT ILLNESS:  86 y/o F--transferred from [facility]--history from patient not reliable and partially obtained from patient's niece (Next of Kin) Bernadette Rubio, [phone]--patient with a history of moderate cognitive impairment resident of an assisted living facility in [facility] who lives with her spouse (apparently who also has cognitive impairment) with patient with a presumed mechanical fall at the assisted living with patient admitted to [place] on 3/27/23.   Patient with a history of chronic atrial fibrillation apparently not on chronic full anticoagulation, on Plavix for unclear reasons, with moderate cognitive impairment with poor B/L hearing (patient, per niece, has refused to use hearing assist devices) with patient maintained on sertraline for depression, with patient complaint at [place] of coughing.    Patient seen by Adiel Eid MD of cardiology in [place] for cardiac preoperative evaluation with echo today with severe MR, TR and severe LA dilatation and severe pulmonary HTN.  CTT head negative at [place] but CTT cervical spine with incidental view of 1.2 cm RUL apical nodule, with recommendation for CTT chest. with patient now transferred to [facility] due to their concern for patient as an operative risk.   Patient removed patient's LUE IV access with resolved bleeding following compress.   Patient is alert to self/hospital, but with poor insight.  Patient denies RIGHT hip pain.  NO chest pain but not reliable for review of cardiac systems.   Patient requiring supplemental O2. (28 Mar 2023 21:11)      PAST MEDICAL & SURGICAL HISTORY:  Migraine      Atrial Fibrillation      Hypertension      GERD (Gastroesophageal Reflux Disease)      Pulmonary hypertension      History of Cataract Surgery  right 2006      Ovarian Cyst      Breast Cyst      History of fracture of right hip              MEDICATIONS:  losartan 100 milliGRAM(s) Oral daily      albuterol/ipratropium for Nebulization 3 milliLiter(s) Nebulizer every 6 hours    acetaminophen     Tablet .. 650 milliGRAM(s) Oral every 6 hours PRN  sertraline 50 milliGRAM(s) Oral daily            FAMILY HISTORY:  No pertinent family history in first degree relatives        Non-contributory    SOCIAL HISTORY:    Unable to obtain social history from patient but not an active smoker as per chart review    Allergies    penicillin (Anaphylaxis)  Zithromax (Hives)    Intolerances    	    REVIEW OF SYSTEMS: Unable to participate in ROS d/t dementia/cognitive impairment  CONSTITUTIONAL: No fever  EYES: No eye pain, visual disturbances, or discharge  ENMT:  No difficulty hearing, tinnitus  NECK: No pain or stiffness  RESPIRATORY: No cough, wheezing,  CARDIOVASCULAR: No chest pain, palpitations, passing out, dizziness, or leg swelling  GASTROINTESTINAL:  No nausea, vomiting, diarrhea or constipation. No melena.  GENITOURINARY: No dysuria, hematuria  NEUROLOGICAL: No stroke like symptoms  SKIN: No burning or lesions   ENDOCRINE: No heat or cold intolerance  MUSCULOSKELETAL: No joint pain or swelling  PSYCHIATRIC: No  anxiety, mood swings  HEME/LYMPH: No bleeding gums  ALLERGY AND IMMUNOLOGIC: No hives or eczema	    All other ROS negative    PHYSICAL EXAM:  T(C): 36.9 (03-29-23 @ 00:11), Max: 36.9 (03-29-23 @ 00:11)  HR: 77 (03-29-23 @ 00:11) (68 - 77)  BP: 125/71 (03-29-23 @ 00:11) (114/73 - 146/85)  RR: 18 (03-29-23 @ 00:11) (18 - 18)  SpO2: 96% (03-29-23 @ 00:11) (94% - 96%)  Wt(kg): --  I&O's Summary      Appearance: Normal	  HEENT:   Normal oral mucosa, EOMI	  Cardiovascular:  S1 S2, + JVD,  + systolic murmur   Respiratory: Lungs clear to auscultation	  Psychiatry: Alert  Gastrointestinal:  Soft, Non-tender, + BS	  Skin: No rashes   Neurologic: Non-focal  Extremities:  No edema  Vascular: Peripheral pulses palpable    	    	  	  CARDIAC MARKERS:  Labs personally reviewed by me                                  12.7   7.66  )-----------( 93       ( 29 Mar 2023 07:09 )             39.1     03-29    134<L>  |  98  |  19  ----------------------------<  130<H>  3.7   |  19<L>  |  0.52    Ca    9.7      29 Mar 2023 07:17  Phos  2.5     03-29  Mg     1.9     03-29    TPro  6.9  /  Alb  4.0  /  TBili  1.3<H>  /  DBili  x   /  AST  31  /  ALT  23  /  AlkPhos  72  03-29          EKG: Personally reviewed by me - AF  Radiology: Personally reviewed by me -     < from: Xray Hip w/ Pelvis 2 or 3 Views, Right (03.27.23 @ 11:45) >  IMPRESSION:  Impacted right subcapital femoral fracture    < end of copied text >  < from: TTE Echo Complete w/o Contrast w/ Doppler (03.28.23 @ 10:39) >   Moderate mitral annular calcification is present.   The mitral valve leaflets appear thickened.   Moderate to Severe mitral regurgitation is present.   The aortic valve is well visualized, appears calcified. Valve opening   seems to be normal.   Mild (1+) aortic regurgitation is present.   Severe (4+) tricuspid valve regurgitation is present.   Severe pulmonary hypertension.   Trace to mild pulmonic valvular regurgitation is present.   The left atrium is severely dilated.   The left ventricle is normal in size, wall motion and contractility.   Mild concentric left ventricular hypertrophy is present.   Estimated left ventricular ejection fraction is 65%.   The right atrium appears severely dilated.   Normal appearing right ventricle structure and function.    < end of copied text >      Assessment /Plan:     86 y/o F--transferred from [facility]--history from patient not reliable with a history of moderate cognitive impairment resident of an assisted living facility, HTN, GERD, AF not on AC, severe MR/TR/pulm HTN with a presumed mechanical fall on 3/27/23.  Patient is alert to self/hospital, but with poor insight.  Patient denies RIGHT hip pain.  NO chest pain but not reliable for review of cardiac systems.       Problem/Plan -1  Problem: Cardiac Risk Stratification  - ECG AF  - Patient noted to have severe MR/TR on echo but not in decompensated HF.   - No hx of tachy lennox arrhythmia.  - Patient is elevated risk for moderate risk ortho surgery. No contraindications to proceed.    Problem/Plan -2  Problem: Chronic Atrial Fibrillation  - Currently not on AC likely d/t high risk for falls  - Currently rate controlled  - Cont to monitor on tele    Problem/Plan -3  Problem: Valvular Dysfunction  - TTE noted moderate to severe MR  - 4+ TR  - Systolic murmur appreciated, mild JVD  - Not in decompensated HF at this time    Problem/Plan -4  Problem: Hypertension  - c/w losartan 100mg PO daily      Differential diagnosis and plan of care discussed with patient after the evaluation. Counseling on diet, nutritional counseling, weight management, exercise and medication compliance was done.   Advanced care planning/advanced directives discussed with patient/family. DNR status including forceful chest compressions to attempt to restart the heart, ventilator support/artificial breathing, electric shock, artificial nutrition, health care proxy, Molst form all discussed with pt. Pt wishes to consider. More than fifteen minutes spent on discussing advanced directives.     Maria Luisa Lacey [facility]  Chucky Garcia DO [facility]  Cardiovascular Medicine  [address] [place] [address] Dr, [address]  Available for call or text via [employer] TE[employer]  Office [phone]   DATE OF SERVICE: 03-29-23 @ 09:36    CHIEF COMPLAINT:Patient is a 87y old  Female who presents with a chief complaint of Transferred per orthopaedics to [place] from [facility] S/P presumed mechanical fall and RIGHT hip fracture (29 Mar 2023 01:04)      HISTORY OF PRESENT ILLNESS:  88 y/o F--transferred from [facility]--history from patient not reliable and partially obtained from patient's niece (Next of Kin) Bernadette Rubio, [phone]--patient with a history of moderate cognitive impairment resident of an assisted living facility in [facility] who lives with her spouse (apparently who also has cognitive impairment) with patient with a presumed mechanical fall at the assisted living with patient admitted to [place] on 3/27/23.   Patient with a history of chronic atrial fibrillation apparently not on chronic full anticoagulation, on Plavix for unclear reasons, with moderate cognitive impairment with poor B/L hearing (patient, per niece, has refused to use hearing assist devices) with patient maintained on sertraline for depression, with patient complaint at [place] of coughing.    Patient seen by Adiel Eid MD of cardiology in [place] for cardiac preoperative evaluation with echo today with severe MR, TR and severe LA dilatation and severe pulmonary HTN.  CTT head negative at [place] but CTT cervical spine with incidental view of 1.2 cm RUL apical nodule, with recommendation for CTT chest. with patient now transferred to [place] due to their concern for patient as an operative risk.   Patient removed patient's LUE IV access with resolved bleeding following compress.   Patient is alert to self/hospital, but with poor insight.  Patient denies RIGHT hip pain.  NO chest pain but not reliable for review of cardiac systems.   Patient requiring supplemental O2. (28 Mar 2023 21:11)      PAST MEDICAL & SURGICAL HISTORY:  Migraine      Atrial Fibrillation      Hypertension      GERD (Gastroesophageal Reflux Disease)      Pulmonary hypertension      History of Cataract Surgery  right 2006      Ovarian Cyst      Breast Cyst      History of fracture of right hip              MEDICATIONS:  losartan 100 milliGRAM(s) Oral daily      albuterol/ipratropium for Nebulization 3 milliLiter(s) Nebulizer every 6 hours    acetaminophen     Tablet .. 650 milliGRAM(s) Oral every 6 hours PRN  sertraline 50 milliGRAM(s) Oral daily            FAMILY HISTORY:  No pertinent family history in first degree relatives        Non-contributory    SOCIAL HISTORY:    Unable to obtain social history from patient but not an active smoker as per chart review    Allergies    penicillin (Anaphylaxis)  Zithromax (Hives)    Intolerances    	    REVIEW OF SYSTEMS: Unable to participate in ROS d/t dementia/cognitive impairment  CONSTITUTIONAL: No fever  EYES: No eye pain, visual disturbances, or discharge  ENMT:  No difficulty hearing, tinnitus  NECK: No pain or stiffness  RESPIRATORY: No cough, wheezing,  CARDIOVASCULAR: No chest pain, palpitations, passing out, dizziness, or leg swelling  GASTROINTESTINAL:  No nausea, vomiting, diarrhea or constipation. No melena.  GENITOURINARY: No dysuria, hematuria  NEUROLOGICAL: No stroke like symptoms  SKIN: No burning or lesions   ENDOCRINE: No heat or cold intolerance  MUSCULOSKELETAL: No joint pain or swelling  PSYCHIATRIC: No  anxiety, mood swings  HEME/LYMPH: No bleeding gums  ALLERGY AND IMMUNOLOGIC: No hives or eczema	    All other ROS negative    PHYSICAL EXAM:  T(C): 36.9 (03-29-23 @ 00:11), Max: 36.9 (03-29-23 @ 00:11)  HR: 77 (03-29-23 @ 00:11) (68 - 77)  BP: 125/71 (03-29-23 @ 00:11) (114/73 - 146/85)  RR: 18 (03-29-23 @ 00:11) (18 - 18)  SpO2: 96% (03-29-23 @ 00:11) (94% - 96%)  Wt(kg): --  I&O's Summary      Appearance: Normal	  HEENT:   Normal oral mucosa, EOMI	  Cardiovascular:  S1 S2, + JVD,  + systolic murmur   Respiratory: Lungs clear to auscultation	  Psychiatry: Alert  Gastrointestinal:  Soft, Non-tender, + BS	  Skin: No rashes   Neurologic: Non-focal  Extremities:  No edema  Vascular: Peripheral pulses palpable    	    	  	  CARDIAC MARKERS:  Labs personally reviewed by me                                  12.7   7.66  )-----------( 93       ( 29 Mar 2023 07:09 )             39.1     03-29    134<L>  |  98  |  19  ----------------------------<  130<H>  3.7   |  19<L>  |  0.52    Ca    9.7      29 Mar 2023 07:17  Phos  2.5     03-29  Mg     1.9     03-29    TPro  6.9  /  Alb  4.0  /  TBili  1.3<H>  /  DBili  x   /  AST  31  /  ALT  23  /  AlkPhos  72  03-29          EKG: Personally reviewed by me - AF  Radiology: Personally reviewed by me -     < from: Xray Hip w/ Pelvis 2 or 3 Views, Right (03.27.23 @ 11:45) >  IMPRESSION:  Impacted right subcapital femoral fracture    < end of copied text >  < from: TTE Echo Complete w/o Contrast w/ Doppler (03.28.23 @ 10:39) >   Moderate mitral annular calcification is present.   The mitral valve leaflets appear thickened.   Moderate to Severe mitral regurgitation is present.   The aortic valve is well visualized, appears calcified. Valve opening   seems to be normal.   Mild (1+) aortic regurgitation is present.   Severe (4+) tricuspid valve regurgitation is present.   Severe pulmonary hypertension.   Trace to mild pulmonic valvular regurgitation is present.   The left atrium is severely dilated.   The left ventricle is normal in size, wall motion and contractility.   Mild concentric left ventricular hypertrophy is present.   Estimated left ventricular ejection fraction is 65%.   The right atrium appears severely dilated.   Normal appearing right ventricle structure and function.    < end of copied text >      Assessment /Plan:     88 y/o F--transferred from [facility]--history from patient not reliable with a history of moderate cognitive impairment resident of an assisted living facility, HTN, GERD, AF not on AC, severe MR/TR/pulm HTN with a presumed mechanical fall on 3/27/23.  Patient is alert to self/hospital, but with poor insight.  Patient denies RIGHT hip pain.  NO chest pain but not reliable for review of cardiac systems.       Problem/Plan -1  Problem: Cardiac Risk Stratification  - ECG AF  - Patient noted to have severe MR/TR on echo but not in decompensated HF.   - No hx of tachy lennox arrhythmia.  - Patient is elevated risk for non-elective moderate risk ortho surgery. No contraindications to proceed.    Problem/Plan -2  Problem: Chronic Atrial Fibrillation  - Currently not on AC likely d/t high risk for falls  - Currently rate controlled  - Cont to monitor on tele    Problem/Plan -3  Problem: Valvular Dysfunction  - TTE noted moderate to severe MR  - 4+ TR  - Systolic murmur appreciated, mild JVD  - Not in decompensated HF at this time    Problem/Plan -4  Problem: Hypertension  - c/w losartan 100mg PO daily      Differential diagnosis and plan of care discussed with patient after the evaluation. Counseling on diet, nutritional counseling, weight management, exercise and medication compliance was done.   Advanced care planning/advanced directives discussed with patient/family. DNR status including forceful chest compressions to attempt to restart the heart, ventilator support/artificial breathing, electric shock, artificial nutrition, health care proxy, Molst form all discussed with pt. Pt wishes to consider. More than fifteen minutes spent on discussing advanced directives.     Maria Luisa Lacey [facility]  Chucky Garcia DO [facility]  Cardiovascular Medicine  [address] [place] [address] Dr, [address]  Available for call or text via [employer] TEAM[employer]  Office [phone]

## 2023-03-29 NOTE — BRIEF OPERATIVE NOTE - NSICDXBRIEFPROCEDURE_GEN_ALL_CORE_FT
PROCEDURES:  Closed reduction and percutaneous pinning (CRPP) of right hip 29-Mar-2023 15:02:46  [facility] [place]

## 2023-03-29 NOTE — CHART NOTE - NSCHARTNOTEFT_GEN_A_CORE
Patient is a 87y old  Female who presents with a chief complaint of Transferred per orthopaedics to [place] from [facility] S/P presumed mechanical fall and RIGHT hip fracture.     Event:   Agitation       Vital Signs Last 24 Hrs  T(C): 36.9 (29 Mar 2023 00:11), Max: 36.9 (29 Mar 2023 00:11)  T(F): 98.4 (29 Mar 2023 00:11), Max: 98.4 (29 Mar 2023 00:11)  HR: 77 (29 Mar 2023 00:11) (68 - 77)  BP: 125/71 (29 Mar 2023 00:11) (114/73 - 151/81)  BP(mean): --  RR: 18 (29 Mar 2023 00:11) (15 - 18)  SpO2: 96% (29 Mar 2023 00:11) (94% - 99%)    Parameters below as of 29 Mar 2023 00:11  Patient On (Oxygen Delivery Method): nasal cannula  O2 Flow (L/min): 2        Labs:                          13.7   8.35  )-----------( 123      ( 28 Mar 2023 09:23 )             40.7     03-28    131<L>  |  102  |  17  ----------------------------<  129<H>  3.5   |  23  |  0.68    Ca    9.4      28 Mar 2023 09:23    TPro  7.9  /  Alb  3.9  /  TBili  0.7  /  DBili  x   /  AST  28  /  ALT  28  /  AlkPhos  73  03-27        Radiology:    Physical Exam:  General: WN/WD NAD  Neurology: A&Ox3, nonfocal, MADDOX x 4  Head:  Normocephalic, atraumatic  Respiratory: CTA B/L  CV: RRR, S1S2, no murmur  Abdominal: Soft, NT, ND no palpable mass  MSK: No edema, + peripheral pulses, FROM all 4 extremity    Assessment & Plan:  HPI:  NIGHT HOSPITALIST:    Patient UNKNOWN to me previously, assigned to me at this point via Dr. Davila of orthopaedics to accept transfer for this 88 y/o F--transferred from [facility]--history from patient not reliable and partially obtained from patient's niece (Next of Kin) Bernadette Rubio, [phone]--patient with a history of moderate cognitive impairment resident of an assisted living facility in [facility] who lives with her spouse (apparently who also has cognitive impairment) with patient with a presumed mechanical fall at the assisted living with patient admitted to [place] on 3/27/23.   Patient with a history of chronic atrial fibrillation apparently not on chronic full anticoagulation, on Plavix for unclear reasons, with moderate cognitive impairment with poor B/L hearing (patient, per niece, has refused to use hearing assist devices) with patient maintained on sertraline for depression, with patient complaint at [place] of coughing.    Patient seen by Adiel Eid MD of cardiology in [place] for cardiac preoperative evaluation with echo today with severe MR, TR and severe LA dilatation and severe pulmonary HTN.  CTT head negative at [place] but CTT cervical spine with incidental view of 1.2 cm RUL apical nodule, with recommendation for CTT chest. with patient now transferred to [place] due to their concern for patient as an operative risk.   Patient removed patient's LUE IV access with resolved bleeding following compress.   Patient is alert to self/hospital, but with poor insight.  Patient denies RIGHT hip pain.  NO chest pain but not reliable for review of cardiac systems.   Patient requiring supplemental O2. (28 Mar 2023 21:11)    Agitation   >  >  >  >    Will follow up with attending in the am Patient is a 87y old  Female Transferred per orthopaedics to [place] from [facility] S/P presumed mechanical fall and RIGHT hip fracture.   Notified by RN patient with severe agitation.  Patient seen at bedside, alert and oriented to self, attempting to get OOB, and bite RN.  Patient previously pulled PIV. Due to increase risk for harm to self and others, Haldo 1mg IM given, Qtc 410ms. Will continue with enhance supervision, frequent reorientation and maintain sleep/awake cycle to decrease confusion.  Consider obtaining Psych consultation.     Will follow up with attending in the am and endorse to day ACP team.       Vital Signs Last 24 Hrs  T(C): 36.9 (29 Mar 2023 00:11), Max: 36.9 (29 Mar 2023 00:11)  T(F): 98.4 (29 Mar 2023 00:11), Max: 98.4 (29 Mar 2023 00:11)  HR: 77 (29 Mar 2023 00:11) (68 - 77)  BP: 125/71 (29 Mar 2023 00:11) (114/73 - 151/81)  BP(mean): --  RR: 18 (29 Mar 2023 00:11) (15 - 18)  SpO2: 96% (29 Mar 2023 00:11) (94% - 99%)    Parameters below as of 29 Mar 2023 00:11  Patient On (Oxygen Delivery Method): nasal cannula  O2 Flow (L/min): 2    Labs:                          13.7   8.35  )-----------( 123      ( 28 Mar 2023 09:23 )             40.7     03-28    131<L>  |  102  |  17  ----------------------------<  129<H>  3.5   |  23  |  0.68    Ca    9.4      28 Mar 2023 09:23    TPro  7.9  /  Alb  3.9  /  TBili  0.7  /  DBili  x   /  AST  28  /  ALT  28  /  AlkPhos  73  03-27    Emelin Reyes [place]one[place], NP  Medicine Department   [facility] [id number] Patient is a 87y old  Female transferred per orthopaedics to [place] from [facility] S/P presumed mechanical fall and right hip fracture.   Notified by RN patient with severe agitation.  Patient seen at bedside, alert and oriented to self, attempting to get OOB, and bite RN.  Patient previously pulled PIV. Due to increase risk for harm to self and others, Haldo 1mg IM given, prior EKG with Qtc 410ms. Will continue with enhance supervision, frequent reorientation and maintain sleep/awake cycle to decrease confusion.  Consider obtaining Psych consultation.     Will follow up with attending in the am and endorse to day ACP team.       Vital Signs Last 24 Hrs  T(C): 36.9 (29 Mar 2023 00:11), Max: 36.9 (29 Mar 2023 00:11)  T(F): 98.4 (29 Mar 2023 00:11), Max: 98.4 (29 Mar 2023 00:11)  HR: 77 (29 Mar 2023 00:11) (68 - 77)  BP: 125/71 (29 Mar 2023 00:11) (114/73 - 151/81)  BP(mean): --  RR: 18 (29 Mar 2023 00:11) (15 - 18)  SpO2: 96% (29 Mar 2023 00:11) (94% - 99%)    Parameters below as of 29 Mar 2023 00:11  Patient On (Oxygen Delivery Method): nasal cannula  O2 Flow (L/min): 2    Labs:                          13.7   8.35  )-----------( 123      ( 28 Mar 2023 09:23 )             40.7     03-28    131<L>  |  102  |  17  ----------------------------<  129<H>  3.5   |  23  |  0.68    Ca    9.4      28 Mar 2023 09:23    TPro  7.9  /  Alb  3.9  /  TBili  0.7  /  DBili  x   /  AST  28  /  ALT  28  /  AlkPhos  73  03-27    Emelin Reyes [place], NP  Medicine Department   Spectralink [id number]

## 2023-03-29 NOTE — CONSULT NOTE ADULT - ASSESSMENT
88 yo F hx dementia, AF, presumably newly found severe MR and severe phtn p/w hip fracture and planned for OR.  Pt clinically does seem comfortable supine in bed. CT to my read w/ GGO nodule RUL that should not preclude any operation  However she does have severe MR which seem most likely causing her phtn group 2. Does not seem decompensated however and RV fxn normal    Pt at intermediate risk for pulmonary complications. Most of the risk seems conferred by her phtn. 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

## 2023-03-29 NOTE — PRE-ANESTHESIA EVALUATION ADULT - NSANTHOSAYNRD_GEN_A_CORE
No. FOZIA screening performed.  STOP BANG Legend: 0-2 = LOW Risk; 3-4 = INTERMEDIATE Risk; 5-8 = HIGH Risk

## 2023-03-29 NOTE — CONSULT NOTE ADULT - SUBJECTIVE AND OBJECTIVE BOX
HPI:  87y Female presents s/p [facility] fall c/o severe R hip pain and inability to ambulate. Patient is demented and A+Ox0-1 at baseline. Per chart, patient had a fall at her assisted living facility and the facility called EMS. Patient denies radiation of pain. Patient denies numbness/tingling/burning in the RLE. No other bone/joint complaints. Patient is a minimal ambulator at baseline with a walker. Patient was seen by orthopedics at [facility], who were prepared to offer her surgery for her hip fracture. After echocardiogram was performed, patient was transferred to [place] for higher level of care.     Pt w/ hx AF not on a/c and echo showing severe MR and severely dilated atrium and severe phtn  CT also incidentally w/ lung nodule    Pt currently aox0 but awake and talking  Denies any current acute complaints of pain or SOB      PAST MEDICAL & SURGICAL HISTORY:  Migraine      Atrial Fibrillation      Hypertension      GERD (Gastroesophageal Reflux Disease)      Pulmonary hypertension      History of Cataract Surgery  right 2006      Ovarian Cyst      Breast Cyst      History of fracture of right hip          FAMILY HISTORY:  No pertinent family history in first degree relatives        SOCIAL HISTORY:  unable to respond    Allergies    penicillin (Anaphylaxis)  Zithromax (Hives)    Intolerances        HOME MEDICATIONS:  Home Medications:  acetaminophen 325 mg oral tablet: 2 tab(s) orally every 6 hours As needed Temp greater or equal to 38.5C (101.3F) (28 Mar 2023 21:19)  clopidogrel 75 mg oral tablet: 1 tab(s) orally once a day (28 Mar 2023 21:19)  losartan 100 mg oral tablet: 1 tab(s) orally once a day (28 Mar 2023 21:19)  Proventil 90 mcg/inh inhalation aerosol: inhaled (28 Mar 2023 21:19)  sertraline 50 mg oral tablet: 1 tab(s) orally once a day (28 Mar 2023 21:19)      REVIEW OF SYSTEMS:  Constitutional: [ ] negative [ ] fevers [ ] chills [ ] weight loss [ ] weight gain  HEENT: [ ] negative [ ] dry eyes [ ] eye irritation [ ] postnasal drip [ ] nasal congestion  CV: [ ] negative  [ ] chest pain [ ] orthopnea [ ] palpitations [ ] murmur  Resp: [ ] negative [ ] cough [ ] shortness of breath [ ] dyspnea [ ] wheezing [ ] sputum [ ] hemoptysis  GI: [ ] negative [ ] nausea [ ] vomiting [ ] diarrhea [ ] constipation [ ] abd pain [ ] dysphagia   : [ ] negative [ ] dysuria [ ] nocturia [ ] hematuria [ ] increased urinary frequency  Musculoskeletal: [ ] negative [ ] back pain [ ] myalgias [ ] arthralgias [ ] fracture  Skin: [ ] negative [ ] rash [ ] itch  Neurological: [ ] negative [ ] headache [ ] dizziness [ ] syncope [ ] weakness [ ] numbness  Psychiatric: [ ] negative [ ] anxiety [ ] depression  Endocrine: [ ] negative [ ] diabetes [ ] thyroid problem  Hematologic/Lymphatic: [ ] negative [ ] anemia [ ] bleeding problem  Allergic/Immunologic: [ ] negative [ ] itchy eyes [ ] nasal discharge [ ] hives [ ] angioedema  [ ] All other systems negative  [x ] Unable to assess ROS because ____dementia____    OBJECTIVE:   Vital Signs Last 24 Hrs  T(C): 36.9 (29 Mar 2023 10:00), Max: 36.9 (29 Mar 2023 00:11)  T(F): 98.5 (29 Mar 2023 10:00), Max: 98.5 (29 Mar 2023 10:00)  HR: 77 (29 Mar 2023 10:00) (68 - 77)  BP: 122/77 (29 Mar 2023 10:00) (114/73 - 146/85)  BP(mean): --  ABP: --  ABP(mean): --  RR: 18 (29 Mar 2023 10:00) (18 - 18)  SpO2: 98% (29 Mar 2023 10:00) (94% - 98%)    O2 Parameters below as of 29 Mar 2023 10:00  Patient On (Oxygen Delivery Method): nasal cannula  O2 Flow (L/min): 2      PHYSICAL EXAM:  General: NAD  HEENT: mmm  Neck: no accessory muscle use  Respiratory: b/l cta  Cardiovascular: s1s2 irreg coni apical   Abdomen: +bs soft nontender  Extremities: no edema   Neurological: aox0         HOSPITAL MEDICATIONS:  Standing Meds:  albuterol/ipratropium for Nebulization 3 milliLiter(s) Nebulizer every 6 hours  losartan 100 milliGRAM(s) Oral daily  sertraline 50 milliGRAM(s) Oral daily      PRN Meds:  acetaminophen     Tablet .. 650 milliGRAM(s) Oral every 6 hours PRN      LABS:                        12.7   7.66  )-----------( 93       ( 29 Mar 2023 07:09 )             39.1     Hgb Trend: 12.7<--, 13.7<--, 14.2<--  03-29    134<L>  |  98  |  19  ----------------------------<  130<H>  3.7   |  19<L>  |  0.52    Ca    9.7      29 Mar 2023 07:17  Phos  2.5     03-29  Mg     1.9     03-29    TPro  6.9  /  Alb  4.0  /  TBili  1.3<H>  /  DBili  x   /  AST  31  /  ALT  23  /  AlkPhos  72  03-29    Creatinine Trend: 0.52<--, 0.68<--, 0.85<--  PT/INR - ( 29 Mar 2023 07:09 )   PT: 13.8 sec;   INR: 1.19 ratio         PTT - ( 29 Mar 2023 07:09 )  PTT:32.6 sec         < from: Xray Chest 1 View AP/PA. (03.27.23 @ 11:45) >    ACC: [id number] EXAM:  XR CHEST 1 VIEW   ORDERED BY: MUSA COX     PROCEDURE DATE:  03/27/2023          INTERPRETATION:  AP chest on March 27, 2020 3:11 AM. This is a neuro   alert.    Gross heart enlargement again noted.    Lungs are clear.    Chest is similar to September 26, 2019.    IMPRESSION: Gross heart enlargement again noted.    --- End of Report ---            PATTI KABA MD; Attending Radiologist  This document has been electronically signed. Mar 27 2023 12:37PM    < end of copied text >

## 2023-03-29 NOTE — CONSULT NOTE ADULT - ASSESSMENT
INCOMPLETE NOTE A/P: 87F presenting with R femoral neck fracture, transferred from [facility] for optimization of cardiac and pulmonary status    - Plan for OR for CRPP of right hip once patient is optimized from medical and cardiac standpoint  - NWB RLE  - Pain control  - Keep NPO for possible OR  - Going to OR for CRPP pending clearance  - Pre-op labs: CBC, BMP, coags, T&S   - Appreciate recs per medicine, cardiology, and pulmonology A/P: 87F presenting with R femoral neck fracture, transferred from [facility] for optimization of cardiac and pulmonary status    - Plan for OR for CRPP of right hip once patient is optimized from medical and cardiac standpoint  - NWB RLE  - Pain control  - Keep NPO for possible OR  - Going to OR for CRPP pending clearance  - Pre-op labs: CBC, BMP, coags, T&S   - Appreciate recs per medicine, cardiology, and pulmonology  - Repeat imaging of R hip ordered (XR hip w/ pelvis, CT pelvis) to ensure fracture is still amenable to CRPP

## 2023-03-29 NOTE — CHART NOTE - NSCHARTNOTEFT_GEN_A_CORE
POC    Seen in recovery  Baseline dementia non combative, non agitated   No apparent Chest Pain, SOB, N/V.    T(C): 36.9 (03-29-23 @ 13:22), Max: 36.9 (03-29-23 @ 00:11)  HR: 77 (03-29-23 @ 13:22) (68 - 78)  BP: 122/77 (03-29-23 @ 13:22) (114/73 - 146/85)  RR: 18 (03-29-23 @ 13:22) (18 - 18)  SpO2: 98% (03-29-23 @ 13:22) (94% - 98%)      Exam:  Awake unable to follow commands; Currently in no Acute Distress  Pulm: CTAB  Abdomen soft / benign  Johnson  [n ]   EXT   RLE       Dressing C/D  [ x]          Calves soft / benign        (+) motor/sensation to light touch        No Sensory Deficits noted        digits cool to touch        Pulses via dopplers (per PACU staff)                        12.7   7.66  )-----------( 93<L>    ( 29 Mar 2023 07:09 )             39.1      03-29    134<L>  |  98  |  19  ----------------------------<  130<H>  3.7   |  19<L>  |  0.52        A/P: S/p Closed reduction and percutaneous pinning (CRPP) of right hip    - transfer back to telemetry      Cards/Medicine notes appreciated  -PT/OT-WBAT-  -Chk AM Labs: CBC   BMP  -DVT PPx: Xarelto QD (h/o a-fib)  -Pain Control PO/IV Pain Rx     LOW dose narcs  -Continue Current Tx     Home Rx restarted     Monitor IVF  (h/o pHTN)  -will follow w/ you      ***See Above  Gerardo ROWLAND  Orthopedics  B: [phone]

## 2023-03-30 LAB
ANION GAP SERPL CALC-SCNC: 16 MMOL/L — SIGNIFICANT CHANGE UP (ref 5–17)
BUN SERPL-MCNC: 24 MG/DL — HIGH (ref 7–23)
CALCIUM SERPL-MCNC: 9.3 MG/DL — SIGNIFICANT CHANGE UP (ref 8.4–10.5)
CHLORIDE SERPL-SCNC: 98 MMOL/L — SIGNIFICANT CHANGE UP (ref 96–108)
CO2 SERPL-SCNC: 21 MMOL/L — LOW (ref 22–31)
CREAT SERPL-MCNC: 0.55 MG/DL — SIGNIFICANT CHANGE UP (ref 0.5–1.3)
EGFR: 90 ML/MIN/1.73M2 — SIGNIFICANT CHANGE UP
GLUCOSE SERPL-MCNC: 141 MG/DL — HIGH (ref 70–99)
HCT VFR BLD CALC: 38 % — SIGNIFICANT CHANGE UP (ref 34.5–45)
HGB BLD-MCNC: 12.3 G/DL — SIGNIFICANT CHANGE UP (ref 11.5–15.5)
MAGNESIUM SERPL-MCNC: 2 MG/DL — SIGNIFICANT CHANGE UP (ref 1.6–2.6)
MCHC RBC-ENTMCNC: 29.6 PG — SIGNIFICANT CHANGE UP (ref 27–34)
MCHC RBC-ENTMCNC: 32.4 GM/DL — SIGNIFICANT CHANGE UP (ref 32–36)
MCV RBC AUTO: 91.3 FL — SIGNIFICANT CHANGE UP (ref 80–100)
NRBC # BLD: 0 /100 WBCS — SIGNIFICANT CHANGE UP (ref 0–0)
PHOSPHATE SERPL-MCNC: 3.3 MG/DL — SIGNIFICANT CHANGE UP (ref 2.5–4.5)
PLATELET # BLD AUTO: 100 K/UL — LOW (ref 150–400)
POTASSIUM SERPL-MCNC: 3.6 MMOL/L — SIGNIFICANT CHANGE UP (ref 3.5–5.3)
POTASSIUM SERPL-SCNC: 3.6 MMOL/L — SIGNIFICANT CHANGE UP (ref 3.5–5.3)
RBC # BLD: 4.16 M/UL — SIGNIFICANT CHANGE UP (ref 3.8–5.2)
RBC # FLD: 14 % — SIGNIFICANT CHANGE UP (ref 10.3–14.5)
SODIUM SERPL-SCNC: 135 MMOL/L — SIGNIFICANT CHANGE UP (ref 135–145)
WBC # BLD: 5.37 K/UL — SIGNIFICANT CHANGE UP (ref 3.8–10.5)
WBC # FLD AUTO: 5.37 K/UL — SIGNIFICANT CHANGE UP (ref 3.8–10.5)

## 2023-03-30 PROCEDURE — 99233 SBSQ HOSP IP/OBS HIGH 50: CPT

## 2023-03-30 RX ORDER — METOPROLOL TARTRATE 50 MG
25 TABLET ORAL DAILY
Refills: 0 | Status: DISCONTINUED | OUTPATIENT
Start: 2023-03-30 | End: 2023-03-31

## 2023-03-30 RX ADMIN — OXYCODONE HYDROCHLORIDE 2.5 MILLIGRAM(S): 5 TABLET ORAL at 10:35

## 2023-03-30 RX ADMIN — Medication 1 TABLET(S): at 11:23

## 2023-03-30 RX ADMIN — Medication 3 MILLILITER(S): at 11:23

## 2023-03-30 RX ADMIN — Medication 650 MILLIGRAM(S): at 19:10

## 2023-03-30 RX ADMIN — LOSARTAN POTASSIUM 100 MILLIGRAM(S): 100 TABLET, FILM COATED ORAL at 05:33

## 2023-03-30 RX ADMIN — SERTRALINE 50 MILLIGRAM(S): 25 TABLET, FILM COATED ORAL at 11:23

## 2023-03-30 RX ADMIN — SODIUM CHLORIDE 85 MILLILITER(S): 9 INJECTION INTRAMUSCULAR; INTRAVENOUS; SUBCUTANEOUS at 04:05

## 2023-03-30 RX ADMIN — Medication 650 MILLIGRAM(S): at 11:22

## 2023-03-30 RX ADMIN — Medication 650 MILLIGRAM(S): at 18:13

## 2023-03-30 RX ADMIN — OXYCODONE HYDROCHLORIDE 2.5 MILLIGRAM(S): 5 TABLET ORAL at 11:30

## 2023-03-30 RX ADMIN — SODIUM CHLORIDE 85 MILLILITER(S): 9 INJECTION INTRAMUSCULAR; INTRAVENOUS; SUBCUTANEOUS at 05:12

## 2023-03-30 RX ADMIN — SODIUM CHLORIDE 85 MILLILITER(S): 9 INJECTION INTRAMUSCULAR; INTRAVENOUS; SUBCUTANEOUS at 21:55

## 2023-03-30 RX ADMIN — Medication 650 MILLIGRAM(S): at 12:15

## 2023-03-30 RX ADMIN — RIVAROXABAN 10 MILLIGRAM(S): KIT at 11:23

## 2023-03-30 RX ADMIN — Medication 100 MILLIGRAM(S): at 05:12

## 2023-03-30 RX ADMIN — Medication 650 MILLIGRAM(S): at 06:51

## 2023-03-30 RX ADMIN — Medication 650 MILLIGRAM(S): at 05:33

## 2023-03-30 RX ADMIN — Medication 3 MILLILITER(S): at 05:12

## 2023-03-30 RX ADMIN — Medication 3 MILLILITER(S): at 18:13

## 2023-03-30 NOTE — OCCUPATIONAL THERAPY INITIAL EVALUATION ADULT - NS ASR FOLLOW COMMAND OT EVAL
Confused, but agreeable to verbal and tactile cues to engage in activity/able to follow single-step instructions with verbal and tactile cues, pt required cues for initiation of all ADLs and functional mobility/100% of the time/able to follow single-step instructions

## 2023-03-30 NOTE — PROVIDER CONTACT NOTE (OTHER) - ASSESSMENT
Pt A&Ox1, agitated, combative, trying to get out of bed.
Pt A&Ox1, agitated, combative. Pt refusing vital signs, medications.
Pt a&ox0, confused. Pt sleeping at time of event. VSS
Pt a&ox0. VSS. Pt sleeping at time of event. HR currently 50-60bpm on cardiac monitor
Pt A&Ox1, transferred from [address] [place] [address] for telemetry monitoring.
Pt a&ox0-1. No urine noted in purwick. Bladder scan volume 406mL

## 2023-03-30 NOTE — PROVIDER CONTACT NOTE (OTHER) - BACKGROUND
88 yo female admitted for fracture of right femur
Pt admitted for fall, right femur fracture
Pt admitted for fall, right femur fracture
88 yo female admitted for right hip fx
Pt admitted for fall, right femur fracture.
88 yo female admitted for right femur fracture

## 2023-03-30 NOTE — PHYSICAL THERAPY INITIAL EVALUATION ADULT - GAIT DEVIATIONS NOTED, PT EVAL
crouch gait, narrow LEONARDA/decreased corazon/decreased step length/decreased stride length/decreased weight-shifting ability

## 2023-03-30 NOTE — OCCUPATIONAL THERAPY INITIAL EVALUATION ADULT - VISUAL ASSESSMENT: TRACKING
Unable to asses 2* impaired cognition , unable to follow instructions Unable to follow complex commands.

## 2023-03-30 NOTE — OCCUPATIONAL THERAPY INITIAL EVALUATION ADULT - RANGE OF MOTION EXAMINATION, LOWER EXTREMITY
Right LE AROM  limited 2 * pain s/p surgery/Left LE Active ROM was WFL (within functional limits)/bilateral LE Passive ROM was WNL (within normal limits)

## 2023-03-30 NOTE — OCCUPATIONAL THERAPY INITIAL EVALUATION ADULT - DIAGNOSIS, OT EVAL
Pt presents with decreased strength, balance, and coordination impacting functional mobility and ADLs.

## 2023-03-30 NOTE — PHYSICAL THERAPY INITIAL EVALUATION ADULT - PERTINENT HX OF CURRENT PROBLEM, REHAB EVAL
88 y/o F--transferred from [facility]--history from patient not reliable with a history of moderate cognitive impairment resident of an assisted living facility, HTN, GERD, AF not on AC, severe MR/TR/pulm HTN with a presumed mechanical fall on 3/27/23. Found to have right femoral neck fracture , now s/p CRPP 3/29.

## 2023-03-30 NOTE — PROVIDER CONTACT NOTE (OTHER) - DATE AND TIME:
28-Mar-2023 23:32
29-Mar-2023 06:04
30-Mar-2023 04:57
29-Mar-2023 01:00
30-Mar-2023 01:54
30-Mar-2023 04:18

## 2023-03-30 NOTE — CHART NOTE - NSCHARTNOTEFT_GEN_A_CORE
Medicine NP note    CC: 8 Beats of WCT  Notified by RN that pt has 7 beats of WCT  aSYMPTOMATIC  pT A&OX0    Vital Signs Last 24 Hrs  T(C): 36.8 (30 Mar 2023 04:10), Max: 36.9 (29 Mar 2023 10:00)  T(F): 98.2 (30 Mar 2023 04:10), Max: 98.5 (29 Mar 2023 10:00)  HR: 68 (30 Mar 2023 04:10) (68 - 78)  BP: 117/85 (30 Mar 2023 04:10) (117/85 - 145/85)  BP(mean): --  RR: 18 (30 Mar 2023 04:10) (16 - 18)  SpO2: 98% (30 Mar 2023 04:10) (97% - 98%)    Parameters below as of 30 Mar 2023 04:10  Patient On (Oxygen Delivery Method): nasal cannula  O2 Flow (L/min): 2      A.P  88 y/o F--transferred from [facility]--history from patient not reliable with a history of moderate cognitive impairment resident of an assisted living facility, HTN, GERD, AF not on AC, severe MR/TR/pulm HTN with a presumed mechanical fall on 3/27/23.    Problem: Cardiac Risk Stratification  - ECG AF  Valvular dysfn  Severe MR, TR  Not volume overloaded  Now ith  # 8 Beats of WCT  Pt asymptomatic  Pt not on BB  F/U AM electrolytes  C/W Tele  F/U cardiology am    Unsustained Bradycardia  Hr in 40;s briefly while pt asleep  Trended upto 70's while pt awake  Asymptomatic  Will continue to monitor  Will Sign out to day team    Ellie Faith [facility] [place]  [phone]

## 2023-03-30 NOTE — PHYSICAL THERAPY INITIAL EVALUATION ADULT - TRANSFER TRAINING, PT EVAL
GOAL: Pt will perform ALL transfers with CGA x1, w/use of appropriate assistive device as needed, in 4 weeks.

## 2023-03-30 NOTE — OCCUPATIONAL THERAPY INITIAL EVALUATION ADULT - NSOTDISCHREC_GEN_A_CORE
Sub-acute Rehab Full Thickness Lip Wedge Repair (Flap) Text: Given the location of the defect and the proximity to free margins a full thickness wedge repair was deemed most appropriate.  Using a sterile surgical marker, the appropriate repair was drawn incorporating the defect and placing the expected incisions perpendicular to the vermilion border.  The vermilion border was also meticulously outlined to ensure appropriate reapproximation during the repair.  The area thus outlined was incised through and through with a #15 scalpel blade.  The muscularis and dermis were reaproximated with deep sutures following hemostasis. Care was taken to realign the vermilion border before proceeding with the superficial closure.  Once the vermilion was realigned the superfical and mucosal closure was finished.

## 2023-03-30 NOTE — OCCUPATIONAL THERAPY INITIAL EVALUATION ADULT - LONG TERM MEMORY, REHAB EVAL
Pt has a history of dementia at baseline/impaired Unable to recall past history ( birthday ,name)/impaired

## 2023-03-30 NOTE — PROVIDER CONTACT NOTE (OTHER) - SITUATION
As per tele tech, pt had 8 beats of wide complex tachycardia up 160 bpm As per tele tech, pt had 8 beats of wide complex tachycardia up to 160 bpm

## 2023-03-30 NOTE — PHYSICAL THERAPY INITIAL EVALUATION ADULT - MANUAL MUSCLE TESTING RESULTS, REHAB EVAL
BUE grossly at least 3/5 throughout ; LLE 3/5, RLE 2+/5; difficult to access due to decreased ability to follow commands/grossly assessed due to

## 2023-03-30 NOTE — PROVIDER CONTACT NOTE (OTHER) - ACTION/TREATMENT ORDERED:
NP notified. Will try to take vital signs again when she is calmer.
Provider made aware.
Provider made aware.
Provider made aware. Labs to be ordered.
NP notified. NP to assess patient at bedside
NP notified. Reschedule AM losartan.

## 2023-03-30 NOTE — OCCUPATIONAL THERAPY INITIAL EVALUATION ADULT - SHORT TERM MEMORY, REHAB EVAL
Pt has a history of dementia at baseline/impaired Unable to recall current location, situation/impaired

## 2023-03-30 NOTE — OCCUPATIONAL THERAPY INITIAL EVALUATION ADULT - PERTINENT HX OF CURRENT PROBLEM, REHAB EVAL
Hospital--history from patient not reliable and partially obtained from patient's niece (Next of Kin) Bernadette Rubio, [phone]--patient with a history of moderate cognitive impairment resident of an assisted living facility in [facility] who lives with her spouse (apparently who also has cognitive impairment) with patient with a presumed mechanical fall at the assisted living with patient admitted to [place] on 3/27/23.   Patient with a history of chronic atrial fibrillation apparently not on chronic full anticoagulation, on Plavix for unclear reasons, with moderate cognitive impairment with poor B/L hearing (patient, per niece, has refused to use hearing assist devices) with patient maintained on sertraline for depression, with patient complaint at [place] of coughing. Patient seen by Adiel Eid MD of cardiology in [place] for cardiac preoperative evaluation with echo today with severe MR, TR and severe LA dilatation and severe pulmonary HTN.  CTT head negative at [place] but CTT cervical spine with incidental view of 1.2 cm RUL apical nodule, with recommendation for CTT chest. with patient now transferred to [place] due to their concern for patient as an operative risk.Patient removed patient's LUE IV access with resolved bleeding following compress. Patient is alert to self/hospital, but with poor insight.Patient denies RIGHT hip pain. NO chest pain but not reliable for review of cardiac systems. Patient requiring supplemental O2.  CT Pelvis 3/29/23:Acute mildly valgus impacted Garden type I right femoral neck fracture,   unchanged since 3/27/2023.  CT CHEST:Scattered right upper lobe groundglass opacities with peripheral predominance. Findings are likely infectious versus inflammatory in etiology. Recommend follow-up imaging in 6-8 weeks to assess for change.  Xray Hip : Displaced right subcapital femoral neck fracture is again seen.Left hip hemiarthroplasty with mild lucency at the metal cement interface. Correlation with prior imaging is suggested.

## 2023-03-30 NOTE — OCCUPATIONAL THERAPY INITIAL EVALUATION ADULT - LIVES WITH, PROFILE
Pt unable to answer 2* to confused mental state. According to social work pt lives in an assisted living facility prior to admission Pt is a poor historian. According to social work pt lives in an assisted living facility prior to admission

## 2023-03-30 NOTE — OCCUPATIONAL THERAPY INITIAL EVALUATION ADULT - STRENGTHENING, PT EVAL
Goal: Pt will increase RLE strength 1 grade for increased safety and independence with ADL task in 4 weeks.

## 2023-03-30 NOTE — PHYSICAL THERAPY INITIAL EVALUATION ADULT - ACTIVE RANGE OF MOTION EXAMINATION, REHAB EVAL
except L hip not formally assessed/bilateral upper extremity Active ROM was WFL (within functional limits)/bilateral  lower extremity Active ROM was WFL (within functional limits)

## 2023-03-31 ENCOUNTER — TRANSCRIPTION ENCOUNTER (OUTPATIENT)
Age: 88
End: 2023-03-31

## 2023-03-31 VITALS
SYSTOLIC BLOOD PRESSURE: 140 MMHG | DIASTOLIC BLOOD PRESSURE: 78 MMHG | OXYGEN SATURATION: 97 % | RESPIRATION RATE: 17 BRPM | HEART RATE: 86 BPM

## 2023-03-31 LAB — SARS-COV-2 RNA SPEC QL NAA+PROBE: SIGNIFICANT CHANGE UP

## 2023-03-31 PROCEDURE — C1713: CPT

## 2023-03-31 PROCEDURE — 72192 CT PELVIS W/O DYE: CPT

## 2023-03-31 PROCEDURE — 76377 3D RENDER W/INTRP POSTPROCES: CPT

## 2023-03-31 PROCEDURE — 73502 X-RAY EXAM HIP UNI 2-3 VIEWS: CPT

## 2023-03-31 PROCEDURE — 97162 PT EVAL MOD COMPLEX 30 MIN: CPT

## 2023-03-31 PROCEDURE — U0005: CPT

## 2023-03-31 PROCEDURE — U0003: CPT

## 2023-03-31 PROCEDURE — 85730 THROMBOPLASTIN TIME PARTIAL: CPT

## 2023-03-31 PROCEDURE — 85027 COMPLETE CBC AUTOMATED: CPT

## 2023-03-31 PROCEDURE — 83735 ASSAY OF MAGNESIUM: CPT

## 2023-03-31 PROCEDURE — 97116 GAIT TRAINING THERAPY: CPT

## 2023-03-31 PROCEDURE — 97530 THERAPEUTIC ACTIVITIES: CPT

## 2023-03-31 PROCEDURE — C9399: CPT

## 2023-03-31 PROCEDURE — 83036 HEMOGLOBIN GLYCOSYLATED A1C: CPT

## 2023-03-31 PROCEDURE — 99233 SBSQ HOSP IP/OBS HIGH 50: CPT

## 2023-03-31 PROCEDURE — 94640 AIRWAY INHALATION TREATMENT: CPT

## 2023-03-31 PROCEDURE — 80053 COMPREHEN METABOLIC PANEL: CPT

## 2023-03-31 PROCEDURE — 80048 BASIC METABOLIC PNL TOTAL CA: CPT

## 2023-03-31 PROCEDURE — 84100 ASSAY OF PHOSPHORUS: CPT

## 2023-03-31 PROCEDURE — 85025 COMPLETE CBC W/AUTO DIFF WBC: CPT

## 2023-03-31 PROCEDURE — 85610 PROTHROMBIN TIME: CPT

## 2023-03-31 PROCEDURE — 71250 CT THORAX DX C-: CPT

## 2023-03-31 PROCEDURE — 97165 OT EVAL LOW COMPLEX 30 MIN: CPT

## 2023-03-31 PROCEDURE — 93005 ELECTROCARDIOGRAM TRACING: CPT

## 2023-03-31 PROCEDURE — 76000 FLUOROSCOPY <1 HR PHYS/QHP: CPT

## 2023-03-31 PROCEDURE — 36415 COLL VENOUS BLD VENIPUNCTURE: CPT

## 2023-03-31 RX ORDER — OXYCODONE HYDROCHLORIDE 5 MG/1
2.5 TABLET ORAL
Qty: 0 | Refills: 0 | DISCHARGE
Start: 2023-03-31

## 2023-03-31 RX ORDER — RIVAROXABAN 15 MG-20MG
1 KIT ORAL
Qty: 0 | Refills: 0 | DISCHARGE
Start: 2023-03-31

## 2023-03-31 RX ORDER — TRAMADOL HYDROCHLORIDE 50 MG/1
1 TABLET ORAL
Qty: 0 | Refills: 0 | DISCHARGE
Start: 2023-03-31

## 2023-03-31 RX ORDER — IPRATROPIUM/ALBUTEROL SULFATE 18-103MCG
3 AEROSOL WITH ADAPTER (GRAM) INHALATION
Qty: 0 | Refills: 0 | DISCHARGE
Start: 2023-03-31

## 2023-03-31 RX ORDER — METOPROLOL TARTRATE 50 MG
1 TABLET ORAL
Qty: 0 | Refills: 0 | DISCHARGE
Start: 2023-03-31

## 2023-03-31 RX ORDER — TRAMADOL HYDROCHLORIDE 50 MG/1
0.5 TABLET ORAL
Qty: 0 | Refills: 0 | DISCHARGE
Start: 2023-03-31

## 2023-03-31 RX ORDER — INFLUENZA VIRUS VACCINE 15; 15; 15; 15 UG/.5ML; UG/.5ML; UG/.5ML; UG/.5ML
0.7 SUSPENSION INTRAMUSCULAR ONCE
Refills: 0 | Status: DISCONTINUED | OUTPATIENT
Start: 2023-03-31 | End: 2023-03-31

## 2023-03-31 RX ORDER — ALBUTEROL 90 UG/1
0 AEROSOL, METERED ORAL
Refills: 0 | DISCHARGE

## 2023-03-31 RX ORDER — LOSARTAN POTASSIUM 100 MG/1
1 TABLET, FILM COATED ORAL
Qty: 0 | Refills: 0 | DISCHARGE
Start: 2023-03-31

## 2023-03-31 RX ORDER — SERTRALINE 25 MG/1
1 TABLET, FILM COATED ORAL
Qty: 0 | Refills: 0 | DISCHARGE
Start: 2023-03-31

## 2023-03-31 RX ORDER — QUETIAPINE FUMARATE 200 MG/1
25 TABLET, FILM COATED ORAL ONCE
Refills: 0 | Status: COMPLETED | OUTPATIENT
Start: 2023-03-31 | End: 2023-03-31

## 2023-03-31 RX ADMIN — LOSARTAN POTASSIUM 100 MILLIGRAM(S): 100 TABLET, FILM COATED ORAL at 05:21

## 2023-03-31 RX ADMIN — QUETIAPINE FUMARATE 25 MILLIGRAM(S): 200 TABLET, FILM COATED ORAL at 15:31

## 2023-03-31 RX ADMIN — SODIUM CHLORIDE 85 MILLILITER(S): 9 INJECTION INTRAMUSCULAR; INTRAVENOUS; SUBCUTANEOUS at 00:20

## 2023-03-31 RX ADMIN — Medication 1 TABLET(S): at 12:46

## 2023-03-31 RX ADMIN — Medication 650 MILLIGRAM(S): at 05:21

## 2023-03-31 RX ADMIN — Medication 650 MILLIGRAM(S): at 01:20

## 2023-03-31 RX ADMIN — RIVAROXABAN 10 MILLIGRAM(S): KIT at 12:45

## 2023-03-31 RX ADMIN — Medication 25 MILLIGRAM(S): at 05:21

## 2023-03-31 RX ADMIN — Medication 650 MILLIGRAM(S): at 12:46

## 2023-03-31 RX ADMIN — Medication 3 MILLILITER(S): at 00:29

## 2023-03-31 RX ADMIN — SODIUM CHLORIDE 85 MILLILITER(S): 9 INJECTION INTRAMUSCULAR; INTRAVENOUS; SUBCUTANEOUS at 05:21

## 2023-03-31 RX ADMIN — SERTRALINE 50 MILLIGRAM(S): 25 TABLET, FILM COATED ORAL at 12:46

## 2023-03-31 RX ADMIN — Medication 3 MILLILITER(S): at 05:21

## 2023-03-31 RX ADMIN — Medication 650 MILLIGRAM(S): at 06:15

## 2023-03-31 RX ADMIN — Medication 3 MILLILITER(S): at 12:45

## 2023-03-31 RX ADMIN — Medication 650 MILLIGRAM(S): at 00:20

## 2023-03-31 RX ADMIN — Medication 650 MILLIGRAM(S): at 13:14

## 2023-03-31 NOTE — DISCHARGE NOTE PROVIDER - NSDCFUADDAPPT_GEN_ALL_CORE_FT
APPTS ARE READY TO BE MADE: [x] YES    Best Family or Patient Contact (if needed):    Additional Information about above appointments (if needed):    1:   2:   3:     Other comments or requests:    APPTS ARE READY TO BE MADE: [x] YES    Best Family or Patient Contact (if needed):    Additional Information about above appointments (if needed):    1:   2:   3:     Other comments or requests:       was provided with follow up request details and was advised to call to schedule follow up within specified time frame.

## 2023-03-31 NOTE — PROGRESS NOTE ADULT - SUBJECTIVE AND OBJECTIVE BOX
Ortho Progress Note    S: Patient seen and examined. No acute events overnight. Pain well controlled with current regimen. Denies lightheadedness/dizziness, CP/SOB. Tolerating diet.       O:  Physical Exam:  Gen: Laying in bed, NAD, alert and oriented.   Resp: Unlabored breathing  Ext: RLE- dressing c/d/i          EHL/FHL/TA/Sol intact          + SILT DP/SP/RAMOS/Sa/T          +DP, extremity WWP    Vital Signs Last 24 Hrs  T(C): 36.8 (30 Mar 2023 04:10), Max: 36.9 (29 Mar 2023 10:00)  T(F): 98.2 (30 Mar 2023 04:10), Max: 98.5 (29 Mar 2023 10:00)  HR: 83 (30 Mar 2023 05:29) (68 - 83)  BP: 134/89 (30 Mar 2023 05:29) (117/85 - 145/85)  BP(mean): --  RR: 18 (30 Mar 2023 04:10) (16 - 18)  SpO2: 98% (30 Mar 2023 04:10) (97% - 98%)    Parameters below as of 30 Mar 2023 04:10  Patient On (Oxygen Delivery Method): nasal cannula  O2 Flow (L/min): 2                            12.7   7.66  )-----------( 93       ( 29 Mar 2023 07:09 )             39.1                         13.7   8.35  )-----------( 123      ( 28 Mar 2023 09:23 )             40.7       03-29    134<L>  |  98  |  19  ----------------------------<  130<H>  3.7   |  19<L>  |  0.52        PT/INR - ( 29 Mar 2023 07:09 )   PT: 13.8 sec;   INR: 1.19 ratio         PTT - ( 29 Mar 2023 07:09 )  PTT:32.6 sec        
DATE OF SERVICE: 03-30-23 @ 13:06    Patient is a 87y old  Female who presents with a chief complaint of Transferred per orthopaedics to [place] from [facility] S/P presumed mechanical fall and RIGHT hip fracture (30 Mar 2023 12:09)      INTERVAL HISTORY: Feels ok. Seen out of bed to chair. Answering questions appropriately but very [place].     REVIEW OF SYSTEMS: Limited participant d/t [place] and baseline cognitive impairment  CONSTITUTIONAL: No weakness  EYES/ENT: No visual changes;  No throat pain   NECK: No pain or stiffness  RESPIRATORY: No cough, wheezing; No shortness of breath  CARDIOVASCULAR: No chest pain or palpitations  GASTROINTESTINAL: No abdominal  pain. No nausea, vomiting, or hematemesis  GENITOURINARY: No dysuria, frequency or hematuria  NEUROLOGICAL: No stroke like symptoms  SKIN: No rashes    TELEMETRY Personally reviewed: A-Flutter 60-80; 8 beats of WCT  	  MEDICATIONS:  losartan 100 milliGRAM(s) Oral daily  metoprolol succinate ER 25 milliGRAM(s) Oral daily        PHYSICAL EXAM:  T(C): 36.7 (03-30-23 @ 12:30), Max: 36.9 (03-29-23 @ 13:22)  HR: 70 (03-30-23 @ 12:30) (68 - 83)  BP: 100/62 (03-30-23 @ 12:30) (100/62 - 145/85)  RR: 18 (03-30-23 @ 12:30) (16 - 18)  SpO2: 98% (03-30-23 @ 12:30) (97% - 100%)  Wt(kg): --  I&O's Summary    29 Mar 2023 07:01  -  30 Mar 2023 07:00  --------------------------------------------------------  IN: 1220 mL / OUT: 0 mL / NET: 1220 mL          Appearance: In no distress	  HEENT:    PERRL, EOMI	  Cardiovascular:  S1 S2, No JVD  Respiratory: Lungs clear to auscultation	  Gastrointestinal:  Soft, Non-tender, + BS	  Vascularature:  No edema of LE  Psychiatric: Appropriate affect   Neuro: no acute focal deficits                               12.3   5.37  )-----------( 100      ( 30 Mar 2023 06:21 )             38.0     03-30    135  |  98  |  24<H>  ----------------------------<  141<H>  3.6   |  21<L>  |  0.55    Ca    9.3      30 Mar 2023 06:20  Phos  3.3     03-30  Mg     2.0     03-30    TPro  6.9  /  Alb  4.0  /  TBili  1.3<H>  /  DBili  x   /  AST  31  /  ALT  23  /  AlkPhos  72  03-29        Labs personally reviewed      ASSESSMENT/PLAN: 	    86 y/o F--transferred from [facility]--history from patient not reliable with a history of moderate cognitive impairment resident of an assisted living facility, HTN, GERD, AF not on AC, severe MR/TR/pulm HTN with a presumed mechanical fall on 3/27/23.  Patient is alert to self/hospital, but with poor insight.  Patient denies RIGHT hip pain.  NO chest pain but not reliable for review of cardiac systems.       Problem/Plan -1  Problem: Cardiac Risk Stratification  - ECG AF  - Patient noted to have severe MR/TR on echo but not in decompensated HF.   - No hx of tachy lennox arrhythmia.  - Patient is elevated risk for non-elective moderate risk ortho surgery. No contraindications to proceed.  - Tolerated surgery well.     Problem/Plan -2  Problem: Chronic Atrial Fibrillation  - Currently not on AC likely d/t high risk for falls  - Currently rate controlled  - Cont to monitor on tele    Problem/Plan -3  Problem: Valvular Dysfunction  - TTE noted moderate to severe MR  - 4+ TR  - Systolic murmur appreciated, mild JVD  - Not in decompensated HF at this time    Problem/Plan -4  Problem: Hypertension  - c/w losartan 100mg PO daily  - c/w Metoprolol 25mg PO daily with hold parameters        JOHNNA Kelsey DO [facility]  Cardiovascular Medicine  [address] [place] [address], [address]  Available through call or text on [employer] TEAMs  Office: [phone]  
DATE OF SERVICE: 03-31-23     Patient is a 87y old  Female who presents with a chief complaint of Transferred per orthopaedics to [place] from [facility] S/P presumed mechanical fall and RIGHT hip fracture (31 Mar 2023 14:44)      INTERVAL HISTORY: feels ok    TELEMETRY Personally reviewed: no events  	  MEDICATIONS:  losartan 100 milliGRAM(s) Oral daily  metoprolol succinate ER 25 milliGRAM(s) Oral daily        PHYSICAL EXAM:  T(C): 36.6 (03-31-23 @ 11:33), Max: 36.8 (03-31-23 @ 00:25)  HR: 86 (03-31-23 @ 17:10) (78 - 86)  BP: 140/78 (03-31-23 @ 17:10) (95/60 - 148/84)  RR: 17 (03-31-23 @ 17:10) (17 - 18)  SpO2: 97% (03-31-23 @ 17:10) (92% - 100%)  Wt(kg): --  I&O's Summary    30 Mar 2023 07:01  -  31 Mar 2023 07:00  --------------------------------------------------------  IN: 2160 mL / OUT: 150 mL / NET: 2010 mL          Appearance: In no distress	  HEENT:    PERRL, EOMI	  Cardiovascular:  S1 S2, No JVD  Respiratory: Lungs clear to auscultation	  Gastrointestinal:  Soft, Non-tender, + BS	  Vascularature:  No edema of LE  Psychiatric: Appropriate affect   Neuro: no acute focal deficits                               12.3   5.37  )-----------( 100      ( 30 Mar 2023 06:21 )             38.0     03-30    135  |  98  |  24<H>  ----------------------------<  141<H>  3.6   |  21<L>  |  0.55    Ca    9.3      30 Mar 2023 06:20  Phos  3.3     03-30  Mg     2.0     03-30          Labs personally reviewed      ASSESSMENT/PLAN: 	    86 y/o F--transferred from [facility]--history from patient not reliable with a history of moderate cognitive impairment resident of an assisted living facility, HTN, GERD, AF not on AC, severe MR/TR/pulm HTN with a presumed mechanical fall on 3/27/23.  Patient is alert to self/hospital, but with poor insight.  Patient denies RIGHT hip pain.  NO chest pain but not reliable for review of cardiac systems.       Problem/Plan -1  Problem: Cardiac Risk Stratification  - ECG AF  - Patient noted to have severe MR/TR on echo but not in decompensated HF.   - No hx of tachy lennox arrhythmia.  - Patient is elevated risk for non-elective moderate risk ortho surgery. No contraindications to proceed.  - Tolerated surgery well.     Problem/Plan -2  Problem: Chronic Atrial Fibrillation  - Currently not on AC likely d/t high risk for falls  - Currently rate controlled  - Cont to monitor on tele    Problem/Plan -3  Problem: Valvular Dysfunction  - TTE noted moderate to severe MR  - 4+ TR  - Systolic murmur appreciated, mild JVD  - Not in decompensated HF at this time    Problem/Plan -4  Problem: Hypertension  - c/w losartan 100mg PO daily  - c/w Metoprolol 25mg PO daily with hold parameters          Chucky Garcia DO [facility]  Cardiovascular Medicine  [address] [place] [address], Suite [facility]  Office: [phone]  Available via Text/call on Microsoft Teams
Patient seen and examined at bedside. Patient reports pain well controlled on medications. No acute events overnight. Pt AO x0, unable to assess ROS    T(C): 36.8 (03-31-23 @ 04:53), Max: 36.8 (03-31-23 @ 00:25)  T(F): 98.2 (03-31-23 @ 04:53), Max: 98.3 (03-31-23 @ 00:25)  HR: 81 (03-31-23 @ 04:53) (70 - 84)  BP: 148/84 (03-31-23 @ 04:53) (95/60 - 148/84)  RR: 18 (03-31-23 @ 04:53) (18 - 18)  SpO2: 100% (03-31-23 @ 04:53) (92% - 100%)    RLE  Skin: No erythema, edema or gross lesions noted. Tegaderm w mild strikethrough c/d/i  Shonda-incisional TTP, otherwise NTTP  SILT L2-S1  Motor: +EHL/FHL/TA/GSc  Compartments soft and compressible  Calves NTTP b/l  +2 DP    A/P  LETTY MILLER is a 87yFemale s/p R hip CRPP, now POD2    - Pain control  - WBAT RLE  - PT/OT/OOB  - DVT ppx: Xarelto  - Dispo: [facility]  - Rest of care per primary team  
Podiatry pager #: [phone] Podiatry consult today for evaluation of bilateral foot wounds.    Patient is a 87y old  Female who presents with a chief complaint of Transferred per orthopaedics to [place] from [facility] S/P presumed mechanical fall and RIGHT hip fracture (30 Mar 2023 06:55)      HPI:  NIGHT HOSPITALIST:    Patient UNKNOWN to me previously, assigned to me at this point via Dr. Davila of orthopaedics to accept transfer for this 86 y/o F--transferred from [facility]--history from patient not reliable and partially obtained from patient's niece (Next of Kin) Bernadette Rubio, [phone]--patient with a history of moderate cognitive impairment resident of an assisted living facility in [facility] who lives with her spouse (apparently who also has cognitive impairment) with patient with a presumed mechanical fall at the assisted living with patient admitted to [place] on 3/27/23.   Patient with a history of chronic atrial fibrillation apparently not on chronic full anticoagulation, on Plavix for unclear reasons, with moderate cognitive impairment with poor B/L hearing (patient, per niece, has refused to use hearing assist devices) with patient maintained on sertraline for depression, with patient complaint at [place] of coughing.    Patient seen by Adiel Eid MD of cardiology in [place] for cardiac preoperative evaluation with echo today with severe MR, TR and severe LA dilatation and severe pulmonary HTN.  CTT head negative at [place] but CTT cervical spine with incidental view of 1.2 cm RUL apical nodule, with recommendation for CTT chest. with patient now transferred to [place] due to their concern for patient as an operative risk.   Patient removed patient's LUE IV access with resolved bleeding following compress.   Patient is alert to self/hospital, but with poor insight.  Patient denies RIGHT hip pain.  NO chest pain but not reliable for review of cardiac systems.   Patient requiring supplemental O2. (28 Mar 2023 21:11)      PAST MEDICAL & SURGICAL HISTORY:  Migraine      Atrial Fibrillation      Hypertension      GERD (Gastroesophageal Reflux Disease)      Pulmonary hypertension      History of Cataract Surgery  right 2006      Ovarian Cyst      Breast Cyst      History of fracture of right hip          MEDICATIONS  (STANDING):  acetaminophen     Tablet .. 650 milliGRAM(s) Oral every 6 hours  albuterol/ipratropium for Nebulization 3 milliLiter(s) Nebulizer every 6 hours  losartan 100 milliGRAM(s) Oral daily  metoprolol succinate ER 25 milliGRAM(s) Oral daily  multivitamin 1 Tablet(s) Oral daily  rivaroxaban 10 milliGRAM(s) Oral daily  sertraline 50 milliGRAM(s) Oral daily  sodium chloride 0.9%. 1000 milliLiter(s) (85 mL/Hr) IV Continuous <Continuous>    MEDICATIONS  (PRN):  HYDROmorphone  Injectable 0.25 milliGRAM(s) IV Push every 10 minutes PRN Moderate Pain (4 - 6)  ondansetron Injectable 4 milliGRAM(s) IV Push once PRN Nausea and/or Vomiting  ondansetron Injectable 4 milliGRAM(s) IV Push every 6 hours PRN Nausea and/or Vomiting  oxyCODONE    IR 2.5 milliGRAM(s) Oral every 8 hours PRN Severe Pain (7 - 10)  traMADol 25 milliGRAM(s) Oral every 8 hours PRN Mild Pain (1 - 3)  traMADol 50 milliGRAM(s) Oral every 8 hours PRN Moderate Pain (4 - 6)      Allergies    penicillin (Anaphylaxis)  Zithromax (Hives)    Intolerances        VITALS:    Vital Signs Last 24 Hrs  T(C): 35.9 (30 Mar 2023 08:36), Max: 36.9 (29 Mar 2023 13:22)  T(F): 96.7 (30 Mar 2023 08:36), Max: 98.2 (30 Mar 2023 04:10)  HR: 78 (30 Mar 2023 11:27) (68 - 83)  BP: 121/73 (30 Mar 2023 11:27) (114/66 - 145/85)  BP(mean): --  RR: 18 (30 Mar 2023 08:36) (16 - 18)  SpO2: 100% (30 Mar 2023 11:27) (97% - 100%)    Parameters below as of 30 Mar 2023 11:27  Patient On (Oxygen Delivery Method): nasal cannula  O2 Flow (L/min): 2      LABS:                          12.3   5.37  )-----------( 100      ( 30 Mar 2023 06:21 )             38.0       03-30    135  |  98  |  24<H>  ----------------------------<  141<H>  3.6   |  21<L>  |  0.55    Ca    9.3      30 Mar 2023 06:20  Phos  3.3     03-30  Mg     2.0     03-30    TPro  6.9  /  Alb  4.0  /  TBili  1.3<H>  /  DBili  x   /  AST  31  /  ALT  23  /  AlkPhos  72  03-29      CAPILLARY BLOOD GLUCOSE          PT/INR - ( 29 Mar 2023 07:09 )   PT: 13.8 sec;   INR: 1.19 ratio         PTT - ( 29 Mar 2023 07:09 )  PTT:32.6 sec    LOWER EXTREMITY PHYSICAL EXAM:    Vasular: DP/PT 1_/4, B/L, CFT <_2 seconds B/L, Temperature gradient _WNL, B/L.   Neuro: Epicritic sensation _Diminished to the level of _Toes, B/L.  Skin: Right foot medial and dorsal first MPJ positive DTI 3 cm x 0.4 cm in diameter: Positive mild discoloration/subdermal hemorrhages without break in skin.  No purulence or fluctuance of malodor or clinical signs of cellulitis.  Left ankle medial aspect positive DTI 1.2 cm in diameter: No break in skin nonfluctuant no clinical signs of infection.  Positive callosities submetatarsal 2 and 3 right foot and plantar medial arch left foot.    RADIOLOGY & ADDITIONAL STUDIES:    
Patient is a 87y old  Female who presents with a chief complaint of Transferred per orthopaedics to [place] from [facility] S/P presumed mechanical fall and RIGHT hip fracture (31 Mar 2023 06:44)      SUBJECTIVE / OVERNIGHT EVENTS: appears comfortable    MEDICATIONS  (STANDING):  acetaminophen     Tablet .. 650 milliGRAM(s) Oral every 6 hours  albuterol/ipratropium for Nebulization 3 milliLiter(s) Nebulizer every 6 hours  losartan 100 milliGRAM(s) Oral daily  metoprolol succinate ER 25 milliGRAM(s) Oral daily  multivitamin 1 Tablet(s) Oral daily  rivaroxaban 10 milliGRAM(s) Oral daily  sertraline 50 milliGRAM(s) Oral daily  sodium chloride 0.9%. 1000 milliLiter(s) (85 mL/Hr) IV Continuous <Continuous>    MEDICATIONS  (PRN):  ondansetron Injectable 4 milliGRAM(s) IV Push once PRN Nausea and/or Vomiting  ondansetron Injectable 4 milliGRAM(s) IV Push every 6 hours PRN Nausea and/or Vomiting  oxyCODONE    IR 2.5 milliGRAM(s) Oral every 8 hours PRN Severe Pain (7 - 10)  traMADol 25 milliGRAM(s) Oral every 8 hours PRN Mild Pain (1 - 3)  traMADol 50 milliGRAM(s) Oral every 8 hours PRN Moderate Pain (4 - 6)        CAPILLARY BLOOD GLUCOSE        I&O's Summary    30 Mar 2023 07:01  -  31 Mar 2023 07:00  --------------------------------------------------------  IN: 2160 mL / OUT: 150 mL / NET: 2010 mL        PHYSICAL EXAM:  GENERAL: NAD, frail   HEAD:  Atraumatic, Normocephalic  EYES conjunctiva and sclera clear  NECK: No JVD  CHEST/LUNG: Clear to auscultation bilaterally; No wheeze  HEART: Regular rate and rhythm; s1S2  ABDOMEN: Soft, Nontender, Nondistended; Bowel sounds present  EXTREMITIES:  2+ Peripheral Pulses, No clubbing, cyanosis, or edema  PSYCH: AAOx1    LABS:                        12.3   5.37  )-----------( 100      ( 30 Mar 2023 06:21 )             38.0     03-30    135  |  98  |  24<H>  ----------------------------<  141<H>  3.6   |  21<L>  |  0.55    Ca    9.3      30 Mar 2023 06:20  Phos  3.3     03-30  Mg     2.0     03-30                RADIOLOGY & ADDITIONAL TESTS:    Imaging Personally Reviewed:    Consultant(s) Notes Reviewed:      Care Discussed with Consultants/Other Providers:  
Patient is a 87y old  Female who presents with a chief complaint of Transferred per orthopaedics to [place] from [facility] S/P presumed mechanical fall and RIGHT hip fracture (30 Mar 2023 12:09)      SUBJECTIVE / OVERNIGHT EVENTS: appears comfortable     MEDICATIONS  (STANDING):  acetaminophen     Tablet .. 650 milliGRAM(s) Oral every 6 hours  albuterol/ipratropium for Nebulization 3 milliLiter(s) Nebulizer every 6 hours  losartan 100 milliGRAM(s) Oral daily  metoprolol succinate ER 25 milliGRAM(s) Oral daily  multivitamin 1 Tablet(s) Oral daily  rivaroxaban 10 milliGRAM(s) Oral daily  sertraline 50 milliGRAM(s) Oral daily  sodium chloride 0.9%. 1000 milliLiter(s) (85 mL/Hr) IV Continuous <Continuous>    MEDICATIONS  (PRN):  HYDROmorphone  Injectable 0.25 milliGRAM(s) IV Push every 10 minutes PRN Moderate Pain (4 - 6)  ondansetron Injectable 4 milliGRAM(s) IV Push once PRN Nausea and/or Vomiting  ondansetron Injectable 4 milliGRAM(s) IV Push every 6 hours PRN Nausea and/or Vomiting  oxyCODONE    IR 2.5 milliGRAM(s) Oral every 8 hours PRN Severe Pain (7 - 10)  traMADol 25 milliGRAM(s) Oral every 8 hours PRN Mild Pain (1 - 3)  traMADol 50 milliGRAM(s) Oral every 8 hours PRN Moderate Pain (4 - 6)        CAPILLARY BLOOD GLUCOSE        I&O's Summary    29 Mar 2023 07:01  -  30 Mar 2023 07:00  --------------------------------------------------------  IN: 1220 mL / OUT: 0 mL / NET: 1220 mL        PHYSICAL EXAM:  GENERAL: NAD, frail   HEAD:  Atraumatic, Normocephalic  EYES conjunctiva and sclera clear  NECK: No JVD  CHEST/LUNG: Clear to auscultation bilaterally; No wheeze  HEART: Regular rate and rhythm; s1S2  ABDOMEN: Soft, Nontender, Nondistended; Bowel sounds present  EXTREMITIES:  2+ Peripheral Pulses, No clubbing, cyanosis, or edema  PSYCH: AAOx1      LABS:                        12.3   5.37  )-----------( 100      ( 30 Mar 2023 06:21 )             38.0     03-30    135  |  98  |  24<H>  ----------------------------<  141<H>  3.6   |  21<L>  |  0.55    Ca    9.3      30 Mar 2023 06:20  Phos  3.3     03-30  Mg     2.0     03-30    TPro  6.9  /  Alb  4.0  /  TBili  1.3<H>  /  DBili  x   /  AST  31  /  ALT  23  /  AlkPhos  72  03-29    PT/INR - ( 29 Mar 2023 07:09 )   PT: 13.8 sec;   INR: 1.19 ratio         PTT - ( 29 Mar 2023 07:09 )  PTT:32.6 sec          RADIOLOGY & ADDITIONAL TESTS:    Imaging Personally Reviewed:    Consultant(s) Notes Reviewed:      Care Discussed with Consultants/Other Providers:  
Patient is a 87y old  Female who presents with a chief complaint of Transferred per orthopaedics to [place] from [facility] S/P presumed mechanical fall and RIGHT hip fracture (29 Mar 2023 11:48)      SUBJECTIVE / OVERNIGHT EVENTS: pt appears comfortable     MEDICATIONS  (STANDING):  albuterol/ipratropium for Nebulization 3 milliLiter(s) Nebulizer every 6 hours  dextrose 5% + sodium chloride 0.45%. 1000 milliLiter(s) (65 mL/Hr) IV Continuous <Continuous>  losartan 100 milliGRAM(s) Oral daily  sertraline 50 milliGRAM(s) Oral daily    MEDICATIONS  (PRN):  acetaminophen     Tablet .. 650 milliGRAM(s) Oral every 6 hours PRN Temp greater or equal to 38C (100.4F), Mild Pain (1 - 3)        CAPILLARY BLOOD GLUCOSE        I&O's Summary      PHYSICAL EXAM:  GENERAL: NAD, frail   HEAD:  Atraumatic, Normocephalic  EYES:  conjunctiva and sclera clear  NECK:  No JVD  CHEST/LUNG: Clear to auscultation bilaterally; No wheeze  HEART: Regular rate and rhythm; No murmurs, rubs, or gallops  ABDOMEN: Soft, Nontender, Nondistended; Bowel sounds present  EXTREMITIES:  2+ Peripheral Pulses, No clubbing, cyanosis, or edema  PSYCH: AAOx1      LABS:                        12.7   7.66  )-----------( 93       ( 29 Mar 2023 07:09 )             39.1     03-29    134<L>  |  98  |  19  ----------------------------<  130<H>  3.7   |  19<L>  |  0.52    Ca    9.7      29 Mar 2023 07:17  Phos  2.5     03-29  Mg     1.9     03-29    TPro  6.9  /  Alb  4.0  /  TBili  1.3<H>  /  DBili  x   /  AST  31  /  ALT  23  /  AlkPhos  72  03-29    PT/INR - ( 29 Mar 2023 07:09 )   PT: 13.8 sec;   INR: 1.19 ratio         PTT - ( 29 Mar 2023 07:09 )  PTT:32.6 sec          RADIOLOGY & ADDITIONAL TESTS:    Imaging Personally Reviewed:    Consultant(s) Notes Reviewed:      Care Discussed with Consultants/Other Providers:

## 2023-03-31 NOTE — PROGRESS NOTE ADULT - PROBLEM SELECTOR PLAN 5
Found to have moderate cognitive impairment   Baseline AAOx1  Aspiration precautions  Fall precautions   PT eval post OR
C/w Losartan.
C/w Losartan.

## 2023-03-31 NOTE — DISCHARGE NOTE PROVIDER - CARE PROVIDER_API CALL
Chucky Garcia (DO)  Cardiovascular Disease; Internal Medicine; Nuclear Cardiology  [address], [address]  [place] [address]  Phone: [phone]  Fax: [phone]  Follow Up Time: 1 week    Adair Woodard)  Orthopaedic Surgery  [address]  [place] [address]  Phone: [phone]  Fax: [phone]  Follow Up Time: 2 weeks

## 2023-03-31 NOTE — PROGRESS NOTE ADULT - PROBLEM SELECTOR PLAN 3
Likely group 2 pthn from valvular dz  currently not decompensated clinically   appreciate pulm eval
Currently not on AC likely d/t high risk for falls  Currently rate controlled  Cont to monitor on tele  D/w cards appreciate recs
Likely group 2 pthn from valvular dz  currently not decompensated clinically   appreciate pulm eval

## 2023-03-31 NOTE — CHART NOTE - NSCHARTNOTESELECT_GEN_ALL_CORE
8 Beats of WCT/Event Note
Ortho POC/Event Note
Agitation/Event Note
Wound Care Team Note:/Event Note

## 2023-03-31 NOTE — DISCHARGE NOTE NURSING/CASE MANAGEMENT/SOCIAL WORK - NSDCPEFALRISK_GEN_ALL_CORE
For information on Fall & Injury Prevention, visit: [email or web address] OR  [email or web address] OR  https://www.cdc.gov/steadi/patient.html

## 2023-03-31 NOTE — PROGRESS NOTE ADULT - REASON FOR ADMISSION
Transferred per orthopaedics to [place] from [facility] S/P presumed mechanical fall and RIGHT hip fracture
Transferred per orthopaedics to [place] from [facility] S/P presumed mechanical fall and RIGHT hip fracture
Transferred per orthopaedics to [place] from [facility] S/P presumed mechanical fall and RIGHT hip fracture
Transferred per orthopaedics to [place] from [facility] S/P presumed mechanical fall and RIGHT hip fracture
Transferred per orthopaedics to [place] from [facility] S/P presumed mechanical fall and RIGHT hip fracture
Transferred per orthopaedics to [place] from [facility] S/P presumed mechanical fall and RIGHT hip fracture
Transferred per orthopaedics to [place] from [facility] S/P presumed mechanical fall and RIGHT hip fracture
Transferred per orthopaedics to [place] from [facility] S/P presumed mechanical fall and RIGHT hip fracture

## 2023-03-31 NOTE — DISCHARGE NOTE PROVIDER - NSDCCPCAREPLAN_GEN_ALL_CORE_FT
PRINCIPAL DISCHARGE DIAGNOSIS  Diagnosis: Hip fracture, right  Assessment and Plan of Treatment: S/p Closed reduction and percutaneous pinning (CRPP) of right hip on 3/29/23  Physical therapy/ OT evaluated patient recommending [facility].   Please follow up with ortho as outpatient      SECONDARY DISCHARGE DIAGNOSES  Diagnosis: Severe pulmonary hypertension  Assessment and Plan of Treatment: Follow-up with your primary care physician within 1 week. Call for appointment.  Please bring all discharge paperwork and list of medications to all follow up appointments  Please call for follow up appointments one day after discharge      Diagnosis: Essential hypertension  Assessment and Plan of Treatment: Continue to follow a low salt/sodium diet.  Perform physical activities as tolerated in consultation with your Primary Care Provider and physical therapist.  Take all medications as prescribed.  Follow up with your medical doctor for routine blood pressure monitoring at your next visit.  Notify your doctor if you have any of the following symptoms:  Dizziness, lightheadedness, blurry vision, headache, chest pain, or shortness of breath.      Diagnosis: Dementia  Assessment and Plan of Treatment: HOME CARE INSTRUCTIONS  The care of individuals with dementia is varied and dependent upon the progression of the dementia. The following suggestions are intended for the person living with, or caring for, the person with dementia.  Create a safe environment.  Remove the locks on bathroom doors to prevent the person from accidentally locking himself or herself in.  Use childproof latches on kitchen cabinets and any place where cleaning supplies, chemicals, or alcohol are kept.  Use childproof covers in unused electrical outlets.  Install childproof devices to keep doors and windows secured.  Remove stove knobs or install safety knobs and an automatic shut-off on the stove.  Lower the temperature on water heaters.  Label medicines and keep them locked up.  Secure knives, lighters, matches, power tools, and guns, and keep these items out of reach.  Keep the house free from clutter. Remove rugs or anything that might contribute to a fall.  Remove objects that might break and hurt the person.  Make sure lighting is good, both inside and outside.  Install grab rails as needed.  Use a monitoring device to alert you to falls or other needs for help.   Reduce confusion.  Keep familiar objects and people around.  Use night lights or dim lights at night.  Label items or areas.  Use reminders, notes, or directions for daily activities or tasks.Keep a simple, consistent routine for waking, meals, bathing, dressing, and bedtime  Create a calm, quiet environment.  Place large clocks and calendars prominently.  Display emergency numbers and home address near all telephones..  Have a consistent nighttime routine.  Ensure a regular walking or physical activity schedule. Involve the person in daily activities as much as possible.  Limit napping during the day.  Limit caffeine.  Attend social events that stimulate rather than overwhelm the senses.  Encourage good nutrition and hydration.  Reduce distractions during meal times and snacks..  Monitor chewing and swallowing ability.  Continue with routine vision, hearin    Diagnosis: Mass of upper lobe of right lung  Assessment and Plan of Treatment: CT chest revealed scattered right upper lobe groundglass opacities with peripheral predominance - recommending follow-up imaging in 6-8 weeks to assess for change.    Diagnosis: Chronic atrial fibrillation  Assessment and Plan of Treatment: Atrial fibrillation is a common heart rhythm problem which increases the risk of stroke and heat attack.  It helps if you control your blood pressure, avoid alcohol, cut down on caffeine, get treatment for your thyroid if it is overactive, and perform moderate exercise in consultation with your Primary Care Provider.  Call your doctor if you experience chest tightness/pain, lightheadedness, loss of consciousness, shortness of breath (especially with exercise), feel your heart racing or beating unusually, frequent or abnormal bleeding.  It is important to take all your heart medications as prescribed.       PRINCIPAL DISCHARGE DIAGNOSIS  Diagnosis: Hip fracture, right  Assessment and Plan of Treatment: S/p Closed reduction and percutaneous pinning (CRPP) of right hip on 3/29/23  Physical therapy/ OT evaluated patient recommending [facility].   Please follow up with ortho as outpatient      SECONDARY DISCHARGE DIAGNOSES  Diagnosis: Chronic atrial fibrillation  Assessment and Plan of Treatment: Atrial fibrillation is a common heart rhythm problem which increases the risk of stroke and heat attack.  It helps if you control your blood pressure, avoid alcohol, cut down on caffeine, get treatment for your thyroid if it is overactive, and perform moderate exercise in consultation with your Primary Care Provider.  Call your doctor if you experience chest tightness/pain, lightheadedness, loss of consciousness, shortness of breath (especially with exercise), feel your heart racing or beating unusually, frequent or abnormal bleeding.  It is important to take all your heart medications as prescribed.  started with Xarelto, Plavix is on hold.      Diagnosis: Severe pulmonary hypertension  Assessment and Plan of Treatment: Follow-up with your primary care physician within 1 week. Call for appointment.  Please bring all discharge paperwork and list of medications to all follow up appointments  Please call for follow up appointments one day after discharge      Diagnosis: Essential hypertension  Assessment and Plan of Treatment: Continue to follow a low salt/sodium diet.  Perform physical activities as tolerated in consultation with your Primary Care Provider and physical therapist.  Take all medications as prescribed.  Follow up with your medical doctor for routine blood pressure monitoring at your next visit.  Notify your doctor if you have any of the following symptoms:  Dizziness, lightheadedness, blurry vision, headache, chest pain, or shortness of breath.      Diagnosis: Dementia  Assessment and Plan of Treatment: HOME CARE INSTRUCTIONS  The care of individuals with dementia is varied and dependent upon the progression of the dementia. The following suggestions are intended for the person living with, or caring for, the person with dementia.  Create a safe environment.  Remove the locks on bathroom doors to prevent the person from accidentally locking himself or herself in.  Use childproof latches on kitchen cabinets and any place where cleaning supplies, chemicals, or alcohol are kept.  Use childproof covers in unused electrical outlets.  Install childproof devices to keep doors and windows secured.  Remove stove knobs or install safety knobs and an automatic shut-off on the stove.  Lower the temperature on water heaters.  Label medicines and keep them locked up.  Secure knives, lighters, matches, power tools, and guns, and keep these items out of reach.  Keep the house free from clutter. Remove rugs or anything that might contribute to a fall.  Remove objects that might break and hurt the person.  Make sure lighting is good, both inside and outside.  Install grab rails as needed.  Use a monitoring device to alert you to falls or other needs for help.   Reduce confusion.  Keep familiar objects and people around.  Use night lights or dim lights at night.  Label items or areas.  Use reminders, notes, or directions for daily activities or tasks.Keep a simple, consistent routine for waking, meals, bathing, dressing, and bedtime  Create a calm, quiet environment.  Place large clocks and calendars prominently.  Display emergency numbers and home address near all telephones..  Have a consistent nighttime routine.  Ensure a regular walking or physical activity schedule. Involve the person in daily activities as much as possible.  Limit napping during the day.  Limit caffeine.  Attend social events that stimulate rather than overwhelm the senses.  Encourage good nutrition and hydration.  Reduce distractions during meal times and snacks..  Monitor chewing and swallowing ability.  Continue with routine vision, hearin    Diagnosis: Mass of upper lobe of right lung  Assessment and Plan of Treatment: CT chest revealed scattered right upper lobe groundglass opacities with peripheral predominance - recommending follow-up imaging in 6-8 weeks to assess for change.

## 2023-03-31 NOTE — DISCHARGE NOTE PROVIDER - REASON FOR ADMISSION
Transferred per orthopaedics to [place] from [facility] S/P presumed mechanical fall and RIGHT hip fracture

## 2023-03-31 NOTE — PROGRESS NOTE ADULT - PROBLEM SELECTOR PLAN 6
Hold lovenox prior to OR  PT eval post OR
C/w Losartan.
Xarelto for dvt ppx   PT eval- [facility]    CM to facilitate dc to [facility]

## 2023-03-31 NOTE — DISCHARGE NOTE PROVIDER - NSDCMRMEDTOKEN_GEN_ALL_CORE_FT
acetaminophen 325 mg oral tablet: 2 tab(s) orally every 6 hours As needed Temp greater or equal to 38.5C (101.3F)  clopidogrel 75 mg oral tablet: 1 tab(s) orally once a day  losartan 100 mg oral tablet: 1 tab(s) orally once a day  Proventil 90 mcg/inh inhalation aerosol: inhaled  sertraline 50 mg oral tablet: 1 tab(s) orally once a day   clopidogrel 75 mg oral tablet: 1 tab(s) orally once a day  ipratropium-albuterol 0.5 mg-2.5 mg/3 mL inhalation solution: 3 milliliter(s) inhaled every 6 hours  losartan 100 mg oral tablet: 1 tab(s) orally once a day  metoprolol succinate 25 mg oral tablet, extended release: 1 tab(s) orally once a day  Multiple Vitamins oral tablet: 1 tab(s) orally once a day  oxyCODONE: 2.5 milligram(s) orally every 8 hours as needed for  severe pain  rivaroxaban 10 mg oral tablet: 1 tab(s) orally once a day  sertraline 50 mg oral tablet: 1 tab(s) orally once a day  traMADol 50 mg oral tablet: 0.5 tab(s) orally every 8 hours As needed Mild Pain (1 - 3)  traMADol 50 mg oral tablet: 1 tab(s) orally every 8 hours As needed Moderate Pain (4 - 6)   ipratropium-albuterol 0.5 mg-2.5 mg/3 mL inhalation solution: 3 milliliter(s) inhaled every 6 hours  losartan 100 mg oral tablet: 1 tab(s) orally once a day  metoprolol succinate 25 mg oral tablet, extended release: 1 tab(s) orally once a day  Multiple Vitamins oral tablet: 1 tab(s) orally once a day  oxyCODONE: 2.5 milligram(s) orally every 8 hours as needed for  severe pain  rivaroxaban 10 mg oral tablet: 1 tab(s) orally once a day  sertraline 50 mg oral tablet: 1 tab(s) orally once a day  traMADol 50 mg oral tablet: 0.5 tab(s) orally every 8 hours As needed Mild Pain (1 - 3)  traMADol 50 mg oral tablet: 1 tab(s) orally every 8 hours As needed Moderate Pain (4 - 6)   acetaminophen 325 mg oral tablet: 2 tab(s) orally every 6 hours as needed for  mild pain  ipratropium-albuterol 0.5 mg-2.5 mg/3 mL inhalation solution: 3 milliliter(s) inhaled every 6 hours  losartan 100 mg oral tablet: 1 tab(s) orally once a day  metoprolol succinate 25 mg oral tablet, extended release: 1 tab(s) orally once a day  Multiple Vitamins oral tablet: 1 tab(s) orally once a day  rivaroxaban 10 mg oral tablet: 1 tab(s) orally once a day  sertraline 50 mg oral tablet: 1 tab(s) orally once a day

## 2023-03-31 NOTE — PROGRESS NOTE ADULT - PROBLEM SELECTOR PLAN 2
Previously not on AC likely d/t high risk for falls  Now on xarelto for dvt ppx per ortho post surgery   Currently rate controlled  Cont to monitor on tele  D/w cards appreciate recs
Currently not on AC likely d/t high risk for falls  Currently rate controlled  Cont to monitor on tele  D/w cards appreciate recs
ECG AF  Patient noted to have severe MR/TR on echo but not in decompensated HF.   Patient is elevated risk for non-elective moderate risk ortho surgery  Does not require further cardiac intervention prior to OR

## 2023-03-31 NOTE — DISCHARGE NOTE NURSING/CASE MANAGEMENT/SOCIAL WORK - PATIENT PORTAL LINK FT
You can access the FollowMyHealth Patient Portal offered by [facility] by registering at the following website: [email or web address]. By joining [employer] [employer]’s FollowMyHealth portal, you will also be able to view your health information using other applications (apps) compatible with our system.

## 2023-03-31 NOTE — CHART NOTE - NSCHARTNOTEFT_GEN_A_CORE
Wound Care Team Note:    Request for wound care consult for foot wound received from team. Mr. Pond was seen and is being managed by Podiatry. Will defer to podiatry for management. Will not follow. Please refer any questions or concerns to podiatry.    Yahaira Smith, NP-C, CWOCN

## 2023-03-31 NOTE — PROGRESS NOTE ADULT - PROBLEM SELECTOR PLAN 1
Found to have right femoral neck fracture   S/p R CRPP with ortho 3/29  Pain control with standing tylenol, oxycodone prn, tramadol prn   PT/OT eval- [facility]
Found to have right femoral neck fracture   D/w ortho 3/29 plan for OR today   Pain control with tylenol and lidocaine patch
Found to have right femoral neck fracture   S/p R CRPP with ortho 3/29  Pain control with standing tylenol, oxycodone prn, tramadol prn   PT/OT eval

## 2023-03-31 NOTE — DISCHARGE NOTE PROVIDER - HOSPITAL COURSE
87y old  Female who presented as a transfer from [facility] S/P presumed mechanical fall and RIGHT hip fracture. Ortho was consulted - S/p Closed reduction and percutaneous pinning (CRPP) of right hip on 3/29/23    Pulm and cardiology were also consulted for presumably newly found severe MR and severe pulmonary HTN. CT chest revealed scattered right upper lobe groundglass opacities with peripheral predominance - recommending follow-up imaging in 6-8 weeks to assess for change. Patient currently not decompensated clinically and RV function normal on echo. Physical therapy/ OT evaluated patient recommending [facility].     Discharge/Dispo/Med rec discussed with attending  ____. Patient medically cleared for discharge ____ with outpatient follow up with PCP, ortho, cards, pulm. 87y old  Female who presented as a transfer from [facility] S/P presumed mechanical fall and RIGHT hip fracture. Ortho was consulted - S/p Closed reduction and percutaneous pinning (CRPP) of right hip on 3/29/23    Pulm and cardiology were also consulted for presumably newly found severe MR and severe pulmonary HTN. CT chest revealed scattered right upper lobe groundglass opacities with peripheral predominance - recommending follow-up imaging in 6-8 weeks to assess for change. Patient currently not decompensated clinically and RV function normal on echo. Physical therapy/ OT evaluated patient recommending [facility].     Discharge/Dispo/Med rec discussed with attending Dr. Jones. Patient medically cleared for discharge to  [facility] with outpatient follow up with PCP, ortho, cards, pulm.

## 2023-03-31 NOTE — DISCHARGE NOTE PROVIDER - NSFOLLOWUPCLINICS_GEN_ALL_ED_FT
[facility] Pulmonolgy and Sleep Medicine  Pulmonology  [address] [place] [address], [place] [address]  Phone: [phone]  Fax:   Follow Up Time: 1 week

## 2023-03-31 NOTE — PROGRESS NOTE ADULT - PROBLEM SELECTOR PLAN 4
Found to have moderate cognitive impairment   Baseline AAOx1  Aspiration precautions  Fall precautions   PT eval post OR
Found to have moderate cognitive impairment   Baseline AAOx1  Aspiration precautions  Fall precautions   PT eval - [facility]
Likely group 2 pthn from valvular dz  currently not decompensated clinically   appreciate pulm clearance

## 2023-03-31 NOTE — PROGRESS NOTE ADULT - ASSESSMENT
Assessment/plan:    Right first MPJ DTI: Stable, noninfected, present on admission.  Left medial ankle positive DTI: Stable, noninfected, present on admission.    Recommend continuous decubitus precautions and use of Z flow boots.  Recommend Betadine paint with Allevyn foam dressing to DTI sites bilateral.  Will return for debridement of calluses bilateral lower extremities.  We will continue to follow for wound care recommendations and to monitor for signs of infection.
ASSESSMENT/PLAN:   LETTY MILLER is a 87yFemale s/p R hip CRPP, now POD1    - Pain control  - WBAT RLE  - PT/OT/OOB  - DVT ppx: Xarelto  - Dispo: TBD  - Rest of care per primary team  
88 y/o F--transferred from [facility]--history from patient not reliable with a history of moderate cognitive impairment resident of an assisted living facility, HTN, GERD, AF not on AC, severe MR/TR/pulm HTN with a presumed mechanical fall on 3/27/23. Found to have right femoral neck fracture s/p R CRPP with ortho 3/29
86 y/o F--transferred from [facility]--history from patient not reliable with a history of moderate cognitive impairment resident of an assisted living facility, HTN, GERD, AF not on AC, severe MR/TR/pulm HTN with a presumed mechanical fall on 3/27/23. Found to have right femoral neck fracture plan for OR with ortho 3/29
88 y/o F--transferred from [facility]--history from patient not reliable with a history of moderate cognitive impairment resident of an assisted living facility, HTN, GERD, AF not on AC, severe MR/TR/pulm HTN with a presumed mechanical fall on 3/27/23. Found to have right femoral neck fracture s/p OR with ortho 3/29

## 2023-03-31 NOTE — DISCHARGE NOTE PROVIDER - NSDCCPGOAL_GEN_ALL_CORE_FT
Speech Language/Pathology  Speech-Language Pathology Bedside Swallow Evaluation      Patient Name: Lauren Burger    KTQDI'F Date: 10/5/2022       Summary   Consult received secondary to decreased oral intake  Pt currently admitted w/ encephalopathy w/ PMHx of dementia, ascites, sepsis, NAVEED and anemia  Pt seen during breakfast meal  Pt upright in bed having difficulty feeding self d/t upper extremity tremors  Pt is a poor historian and very confused but reports this is normal for him and he has difficulty eating at home as well  Pt denies difficulty chewing/swallow  Observed w/ regular textures and thin liquids  Lids supplied for hot beverage as pt was spilling frequently on himself  Pt presents w/ mild oral phase dysphagia characterized by slightly prolonged mastication and min oral residual  Suspect pharyngeal phase is grossly WFL, throat clear x1 noted on thin liquids  RN reported pt had been chewing any whole pills given to him, therefore recommend meds crushed in puree  Recommend to continue regular textures with thin liquids, meds crushed in puree  Pt will require set up assistance and intermittent supervision to ensure self feeding  SLP will continue to follow for diet tolerance    Risk/s for Aspiration: Low     Recommended Diet: regular diet and thin liquids   Recommended Form of Meds: crushed with puree   Aspiration precautions and swallowing strategies: upright posture, only feed when fully alert, slow rate of feeding, quiet environment (tv off, limit talking, door closed, etc ) and alternating bites and sips  Other Recommendations: Continue frequent oral care, lids for all beverages    Current Medical Status    Lauren Burger is a 70 y o  male who presents with confusion, generalized weakness  Patient had a fall at home a week ago and since then his belly has been getting bigger and he has been getting more confused  Also complaining of worsening shortness of breath with exertion some jaundice noted  To get better and follow your care plan as instructed. Family felt it might be secondary to high ammonia levels but fortunately they are okay  Denies any fevers chills sick contacts or head trauma  Denies any bleeding at home      Current Precautions:  Fall      Allergies:  No known food allergies    Past medical history:  Please see H&P for details    Special Studies:  CT Head:  No acute intracranial abnormality  Chronic microangiopathic changes  CT Chest:  1  Significantly limited study without IV or oral contrast   2  No definite acute traumatic injury in the chest, abdomen or pelvis within limitations  If relevant, repeat imaging with IV and oral contrast could be considered  3  Large volume of low-density abdominopelvic free fluid presumably corresponding to increasing ascites in the setting of cirrhosis and splenomegaly   4  Moderate bilateral upper lobe and right middle lobe groundglass opacities, new from previous study  Relative sparing of the lower lobes which would be unusual for Covid 19 pneumonia although this cannot be completely excluded  Other inflammatory or   infectious etiologies also to be considered  5  New small water density bilateral pleural effusions  Social/Education/Vocational Hx:  Pt lives with family    Swallow Information   Current Risks for Dysphagia & Aspiration: AMS  Current Symptoms/Concerns: decreased PO intake  Current Diet: regular diet and thin liquids   Baseline Diet: regular diet and thin liquids      Baseline Assessment   Behavior/Cognition: alert and decreased attention  Speech/Language Status: able to participate in basic conversation  Patient Positioning: upright in bed  Pain Status/Interventions/Response to Interventions:   No report of or nonverbal indications of pain         Swallow Mechanism Exam  Facial: symmetrical  Labial: decreased coordination  Lingual: decreased coordination  Velum: unable to visualize  Mandible: unable to test 2/2 limited command following  Dentition: limited dentition  Vocal quality:clear/adequate   Volitional Cough: strong/productive   Respiratory Status: on RA       Consistencies Assessed and Performance   Consistencies Administered: thin liquids, puree, soft solids and hard solids    Oral Stage: mild  Mastication was mildly prolonged with the materials administered today  Bolus formation and transfer were functional with minimal oral residual on lingual surface  No overt s/s reduced oral control  Pharyngeal Stage: Suspect grossly WFL  Swallow Mechanics:  Swallowing initiation appeared prompt  Laryngeal rise was palpated and judged to be within functional limits  No coughing noted, throat clear x1    Esophageal Concerns: none reported    Summary and Recommendations (see above)    Results Reviewed with: patient, RN and dietician     Treatment Recommended: Dysphagia therapy     Frequency of treatment: 1-2x/week      Dysphagia LTG  -Patient will demonstrate safe and effective oral intake (without overt s/s significant oral/pharyngeal dysphagia including s/s penetration or aspiration) for the highest appropriate diet level  Short Term Goals:  -Pt will tolerate regular and  thin liquid with no significant s/s oral or pharyngeal dysphagia across 1-3 diagnostic session/s  Re: Mastication  -Patient will demonstrate adequate mastication to safely consume the  least restrictive diet with no verbal, visual or tactile cues needed  Re: Compensatory Strategies  -  -Patient will demonstrate independent use of recommended safe swallowing strategies during a clinician assessed meal across 1-3 diagnostic sessions     - Patient’s caregiver will demonstrate adherence to recommended diet, as well as application of aspiration precautions and compensatory strategies        Speech Therapy Prognosis   Prognosis: fair    Prognosis Considerations: cognitive status and therapeutic potential     Дмитрий Paul Troy 89 CCC-SLP  10/5/2022

## 2023-03-31 NOTE — DISCHARGE NOTE PROVIDER - PROVIDER TOKENS
PROVIDER:[TOKEN:[[id number]],FOLLOWUP:[1 week]],PROVIDER:[TOKEN:[[id number]],FOLLOWUP:[2 weeks]]

## 2023-04-02 ENCOUNTER — INPATIENT (INPATIENT)
Facility: HOSPITAL | Age: 88
LOS: 4 days | Discharge: NURSING, REHAB OR LONG-TERM CARE | DRG: 177 | End: 2023-04-07
Attending: HOSPITALIST | Admitting: EMERGENCY MEDICINE
Payer: MEDICARE

## 2023-04-02 VITALS
TEMPERATURE: 98 F | SYSTOLIC BLOOD PRESSURE: 134 MMHG | DIASTOLIC BLOOD PRESSURE: 76 MMHG | HEART RATE: 71 BPM | OXYGEN SATURATION: 95 % | HEIGHT: 62 IN

## 2023-04-02 DIAGNOSIS — Z87.81 PERSONAL HISTORY OF (HEALED) TRAUMATIC FRACTURE: Chronic | ICD-10-CM

## 2023-04-02 DIAGNOSIS — J69.0 PNEUMONITIS DUE TO INHALATION OF FOOD AND VOMIT: ICD-10-CM

## 2023-04-02 PROBLEM — I27.20 PULMONARY HYPERTENSION, UNSPECIFIED: Chronic | Status: ACTIVE | Noted: 2023-03-28

## 2023-04-02 LAB
ACANTHOCYTES BLD QL SMEAR: SLIGHT — SIGNIFICANT CHANGE UP
ALBUMIN SERPL ELPH-MCNC: 3.6 G/DL — SIGNIFICANT CHANGE UP (ref 3.3–5)
ALP SERPL-CCNC: 134 U/L — HIGH (ref 40–120)
ALT FLD-CCNC: 57 U/L — SIGNIFICANT CHANGE UP (ref 12–78)
ANION GAP SERPL CALC-SCNC: 4 MMOL/L — LOW (ref 5–17)
ANISOCYTOSIS BLD QL: SLIGHT — SIGNIFICANT CHANGE UP
APTT BLD: 30.3 SEC — SIGNIFICANT CHANGE UP (ref 27.5–35.5)
AST SERPL-CCNC: 47 U/L — HIGH (ref 15–37)
BASOPHILS # BLD AUTO: 0.01 K/UL — SIGNIFICANT CHANGE UP (ref 0–0.2)
BASOPHILS NFR BLD AUTO: 0.1 % — SIGNIFICANT CHANGE UP (ref 0–2)
BILIRUB SERPL-MCNC: 1.1 MG/DL — SIGNIFICANT CHANGE UP (ref 0.2–1.2)
BUN SERPL-MCNC: 27 MG/DL — HIGH (ref 7–23)
CALCIUM SERPL-MCNC: 9.5 MG/DL — SIGNIFICANT CHANGE UP (ref 8.5–10.1)
CHLORIDE SERPL-SCNC: 109 MMOL/L — HIGH (ref 96–108)
CO2 SERPL-SCNC: 26 MMOL/L — SIGNIFICANT CHANGE UP (ref 22–31)
CREAT SERPL-MCNC: 0.72 MG/DL — SIGNIFICANT CHANGE UP (ref 0.5–1.3)
EGFR: 82 ML/MIN/1.73M2 — SIGNIFICANT CHANGE UP
ELLIPTOCYTES BLD QL SMEAR: SLIGHT — SIGNIFICANT CHANGE UP
EOSINOPHIL # BLD AUTO: 0.02 K/UL — SIGNIFICANT CHANGE UP (ref 0–0.5)
EOSINOPHIL NFR BLD AUTO: 0.2 % — SIGNIFICANT CHANGE UP (ref 0–6)
GIANT PLATELETS BLD QL SMEAR: PRESENT — SIGNIFICANT CHANGE UP
GLUCOSE SERPL-MCNC: 121 MG/DL — HIGH (ref 70–99)
HCT VFR BLD CALC: 35.6 % — SIGNIFICANT CHANGE UP (ref 34.5–45)
HGB BLD-MCNC: 11.4 G/DL — LOW (ref 11.5–15.5)
IMM GRANULOCYTES NFR BLD AUTO: 0.8 % — SIGNIFICANT CHANGE UP (ref 0–0.9)
INR BLD: 1.44 RATIO — HIGH (ref 0.88–1.16)
LACTATE SERPL-SCNC: 1.5 MMOL/L — SIGNIFICANT CHANGE UP (ref 0.7–2)
LACTATE SERPL-SCNC: 2.3 MMOL/L — HIGH (ref 0.7–2)
LYMPHOCYTES # BLD AUTO: 0.78 K/UL — LOW (ref 1–3.3)
LYMPHOCYTES # BLD AUTO: 8.4 % — LOW (ref 13–44)
MANUAL SMEAR VERIFICATION: SIGNIFICANT CHANGE UP
MCHC RBC-ENTMCNC: 30 PG — SIGNIFICANT CHANGE UP (ref 27–34)
MCHC RBC-ENTMCNC: 32 GM/DL — SIGNIFICANT CHANGE UP (ref 32–36)
MCV RBC AUTO: 93.7 FL — SIGNIFICANT CHANGE UP (ref 80–100)
MONOCYTES # BLD AUTO: 1.58 K/UL — HIGH (ref 0–0.9)
MONOCYTES NFR BLD AUTO: 17 % — HIGH (ref 2–14)
NEUTROPHILS # BLD AUTO: 6.83 K/UL — SIGNIFICANT CHANGE UP (ref 1.8–7.4)
NEUTROPHILS NFR BLD AUTO: 73.5 % — SIGNIFICANT CHANGE UP (ref 43–77)
PLAT MORPH BLD: ABNORMAL
PLATELET # BLD AUTO: 149 K/UL — LOW (ref 150–400)
PLATELET COUNT - ESTIMATE: NORMAL — SIGNIFICANT CHANGE UP
POIKILOCYTOSIS BLD QL AUTO: SLIGHT — SIGNIFICANT CHANGE UP
POLYCHROMASIA BLD QL SMEAR: SLIGHT — SIGNIFICANT CHANGE UP
POTASSIUM SERPL-MCNC: 3.6 MMOL/L — SIGNIFICANT CHANGE UP (ref 3.5–5.3)
POTASSIUM SERPL-SCNC: 3.6 MMOL/L — SIGNIFICANT CHANGE UP (ref 3.5–5.3)
PROT SERPL-MCNC: 7.5 GM/DL — SIGNIFICANT CHANGE UP (ref 6–8.3)
PROTHROM AB SERPL-ACNC: 16.8 SEC — HIGH (ref 10.5–13.4)
RAPID RVP RESULT: SIGNIFICANT CHANGE UP
RBC # BLD: 3.8 M/UL — SIGNIFICANT CHANGE UP (ref 3.8–5.2)
RBC # FLD: 14.6 % — HIGH (ref 10.3–14.5)
RBC BLD AUTO: ABNORMAL
SARS-COV-2 RNA SPEC QL NAA+PROBE: SIGNIFICANT CHANGE UP
SODIUM SERPL-SCNC: 139 MMOL/L — SIGNIFICANT CHANGE UP (ref 135–145)
TROPONIN I, HIGH SENSITIVITY RESULT: 40.29 NG/L — SIGNIFICANT CHANGE UP
TROPONIN I, HIGH SENSITIVITY RESULT: 41.55 NG/L — SIGNIFICANT CHANGE UP
WBC # BLD: 9.29 K/UL — SIGNIFICANT CHANGE UP (ref 3.8–10.5)
WBC # FLD AUTO: 9.29 K/UL — SIGNIFICANT CHANGE UP (ref 3.8–10.5)

## 2023-04-02 PROCEDURE — 92610 EVALUATE SWALLOWING FUNCTION: CPT | Mod: GN

## 2023-04-02 PROCEDURE — 82962 GLUCOSE BLOOD TEST: CPT

## 2023-04-02 PROCEDURE — 97116 GAIT TRAINING THERAPY: CPT | Mod: GP

## 2023-04-02 PROCEDURE — 0225U NFCT DS DNA&RNA 21 SARSCOV2: CPT

## 2023-04-02 PROCEDURE — 94640 AIRWAY INHALATION TREATMENT: CPT

## 2023-04-02 PROCEDURE — 82607 VITAMIN B-12: CPT

## 2023-04-02 PROCEDURE — 85025 COMPLETE CBC W/AUTO DIFF WBC: CPT

## 2023-04-02 PROCEDURE — 84100 ASSAY OF PHOSPHORUS: CPT

## 2023-04-02 PROCEDURE — 92523 SPEECH SOUND LANG COMPREHEN: CPT | Mod: GN

## 2023-04-02 PROCEDURE — 84484 ASSAY OF TROPONIN QUANT: CPT

## 2023-04-02 PROCEDURE — 36415 COLL VENOUS BLD VENIPUNCTURE: CPT

## 2023-04-02 PROCEDURE — 94668 MNPJ CHEST WALL SBSQ: CPT

## 2023-04-02 PROCEDURE — 84443 ASSAY THYROID STIM HORMONE: CPT

## 2023-04-02 PROCEDURE — 83036 HEMOGLOBIN GLYCOSYLATED A1C: CPT

## 2023-04-02 PROCEDURE — 83735 ASSAY OF MAGNESIUM: CPT

## 2023-04-02 PROCEDURE — 82746 ASSAY OF FOLIC ACID SERUM: CPT

## 2023-04-02 PROCEDURE — 93010 ELECTROCARDIOGRAM REPORT: CPT

## 2023-04-02 PROCEDURE — 92507 TX SP LANG VOICE COMM INDIV: CPT | Mod: GN

## 2023-04-02 PROCEDURE — 74176 CT ABD & PELVIS W/O CONTRAST: CPT | Mod: 26,MD

## 2023-04-02 PROCEDURE — 99291 CRITICAL CARE FIRST HOUR: CPT

## 2023-04-02 PROCEDURE — 97530 THERAPEUTIC ACTIVITIES: CPT | Mod: GP

## 2023-04-02 PROCEDURE — 70450 CT HEAD/BRAIN W/O DYE: CPT | Mod: 26,MD

## 2023-04-02 PROCEDURE — 87635 SARS-COV-2 COVID-19 AMP PRB: CPT

## 2023-04-02 PROCEDURE — 80053 COMPREHEN METABOLIC PANEL: CPT

## 2023-04-02 PROCEDURE — 92526 ORAL FUNCTION THERAPY: CPT | Mod: GN

## 2023-04-02 PROCEDURE — 97162 PT EVAL MOD COMPLEX 30 MIN: CPT | Mod: GP

## 2023-04-02 PROCEDURE — 71250 CT THORAX DX C-: CPT | Mod: 26,MA

## 2023-04-02 PROCEDURE — 99223 1ST HOSP IP/OBS HIGH 75: CPT

## 2023-04-02 PROCEDURE — 80048 BASIC METABOLIC PNL TOTAL CA: CPT

## 2023-04-02 PROCEDURE — 83605 ASSAY OF LACTIC ACID: CPT

## 2023-04-02 RX ORDER — TIOTROPIUM BROMIDE 18 UG/1
2 CAPSULE ORAL; RESPIRATORY (INHALATION) DAILY
Refills: 0 | Status: DISCONTINUED | OUTPATIENT
Start: 2023-04-02 | End: 2023-04-07

## 2023-04-02 RX ORDER — LOSARTAN POTASSIUM 100 MG/1
100 TABLET, FILM COATED ORAL DAILY
Refills: 0 | Status: DISCONTINUED | OUTPATIENT
Start: 2023-04-02 | End: 2023-04-07

## 2023-04-02 RX ORDER — AZTREONAM 2 G
2000 VIAL (EA) INJECTION EVERY 8 HOURS
Refills: 0 | Status: DISCONTINUED | OUTPATIENT
Start: 2023-04-02 | End: 2023-04-02

## 2023-04-02 RX ORDER — VANCOMYCIN HCL 1 G
750 VIAL (EA) INTRAVENOUS ONCE
Refills: 0 | Status: COMPLETED | OUTPATIENT
Start: 2023-04-02 | End: 2023-04-02

## 2023-04-02 RX ORDER — ACETAMINOPHEN 500 MG
650 TABLET ORAL EVERY 6 HOURS
Refills: 0 | Status: DISCONTINUED | OUTPATIENT
Start: 2023-04-02 | End: 2023-04-07

## 2023-04-02 RX ORDER — SODIUM CHLORIDE 9 MG/ML
1000 INJECTION INTRAMUSCULAR; INTRAVENOUS; SUBCUTANEOUS
Refills: 0 | Status: DISCONTINUED | OUTPATIENT
Start: 2023-04-02 | End: 2023-04-07

## 2023-04-02 RX ORDER — ACETAMINOPHEN 500 MG
1000 TABLET ORAL ONCE
Refills: 0 | Status: COMPLETED | OUTPATIENT
Start: 2023-04-02 | End: 2023-04-02

## 2023-04-02 RX ORDER — ALBUTEROL 90 UG/1
2 AEROSOL, METERED ORAL EVERY 6 HOURS
Refills: 0 | Status: DISCONTINUED | OUTPATIENT
Start: 2023-04-02 | End: 2023-04-07

## 2023-04-02 RX ORDER — RIVAROXABAN 15 MG-20MG
10 KIT ORAL DAILY
Refills: 0 | Status: DISCONTINUED | OUTPATIENT
Start: 2023-04-02 | End: 2023-04-07

## 2023-04-02 RX ORDER — METOPROLOL TARTRATE 50 MG
25 TABLET ORAL DAILY
Refills: 0 | Status: DISCONTINUED | OUTPATIENT
Start: 2023-04-02 | End: 2023-04-07

## 2023-04-02 RX ORDER — ONDANSETRON 8 MG/1
4 TABLET, FILM COATED ORAL EVERY 8 HOURS
Refills: 0 | Status: DISCONTINUED | OUTPATIENT
Start: 2023-04-02 | End: 2023-04-07

## 2023-04-02 RX ORDER — LANOLIN ALCOHOL/MO/W.PET/CERES
3 CREAM (GRAM) TOPICAL AT BEDTIME
Refills: 0 | Status: DISCONTINUED | OUTPATIENT
Start: 2023-04-02 | End: 2023-04-07

## 2023-04-02 RX ORDER — SERTRALINE 25 MG/1
50 TABLET, FILM COATED ORAL DAILY
Refills: 0 | Status: DISCONTINUED | OUTPATIENT
Start: 2023-04-02 | End: 2023-04-07

## 2023-04-02 RX ORDER — SODIUM CHLORIDE 9 MG/ML
1000 INJECTION INTRAMUSCULAR; INTRAVENOUS; SUBCUTANEOUS ONCE
Refills: 0 | Status: COMPLETED | OUTPATIENT
Start: 2023-04-02 | End: 2023-04-02

## 2023-04-02 RX ORDER — AZTREONAM 2 G
2000 VIAL (EA) INJECTION EVERY 8 HOURS
Refills: 0 | Status: DISCONTINUED | OUTPATIENT
Start: 2023-04-02 | End: 2023-04-03

## 2023-04-02 RX ADMIN — Medication 250 MILLIGRAM(S): at 20:03

## 2023-04-02 RX ADMIN — Medication 100 MILLIGRAM(S): at 22:05

## 2023-04-02 RX ADMIN — Medication 400 MILLIGRAM(S): at 21:16

## 2023-04-02 RX ADMIN — Medication 100 MILLIGRAM(S): at 18:03

## 2023-04-02 RX ADMIN — SODIUM CHLORIDE 1000 MILLILITER(S): 9 INJECTION INTRAMUSCULAR; INTRAVENOUS; SUBCUTANEOUS at 18:02

## 2023-04-02 RX ADMIN — SODIUM CHLORIDE 75 MILLILITER(S): 9 INJECTION INTRAMUSCULAR; INTRAVENOUS; SUBCUTANEOUS at 23:43

## 2023-04-02 RX ADMIN — Medication 2000 MILLIGRAM(S): at 18:29

## 2023-04-02 NOTE — H&P ADULT - ASSESSMENT
88 y/o F presented with cough, desaturation to 90%     1. Acute hypoxic respiratory distress (at rehab facility) secondary to aspiration pneumonia   - Admit to med/surg observation with continuous pulse ox   - SpO2 90% on room air at [facility] -> 99% on 5L nasal cannula   - Titrate off O2 as tolerated   - Does not meet SIRS criteria   - s/p Aztreonam, Clindamycin, Vancomycin in ER; c/w Aztreonam   - f/u BCx x 2, sputum culture; adjust abx based on sensitivities  - CT chest: Interval bronchial impaction of the anterior basal right lower lobe bronchus with new small ill-defined nodule, may be sequela of recent choking episode/aspiration  - Suction, aspiration precautions, chest PT, dysphagia evaluation   - NPO until dysphagia evaluation completed    - Trend WBC, monitor for temperatures   - Tylenol for temperatures PRN   - s/p 1L of NS, c/w 75 ml/hr overnight (monitor for fluid overload)   - Monitor BP closely     2. Acute metabolic encephalopathy likely secondary to infectious etiology vs. hospital acquired delirium vs. progression of dementia vs. metabolic vs. neurologic   - CT head: no acute intracranial abnormality   - Ordered B12, folate, TSH, UA, UCx   - NPO until dysphagia evaluation completed   - PT evaluation     3. Recent right hip fracture s/p closed reduction and percutaneous pinning of right hip on 3/29/23  - Xarelto 10 mg (for 33 more days given recent hip fracture -> will need to discuss if pt shoudl be continued on anticoagulation for A fib after 33 days or not, she is a fall risk and was off anticoagulation previously)   - Pt has sutures at site, will need removal in 1 week     4. Elevated lacic acid on admission   - Lactate 2.3 -> 1.5 after IVF     5. Normocytic anemia   - Hb 11.4 (baseline ~12), monitor closely     6. Thrombocytopenia   - , trend     7. Hyperglycemia   - Glucose 121, monitor     8. Elevated alkaline phosphatase and AST   - Alkaline phosphatase 134, AST 47, trend   - If persistent elevation in transaminases, will order a RUQ US    9. History of A fib, dementia (AAOx2), HTN, GERD, right hip fracture s/p closed reduction and percutaneous pinning of right hip on 3/29/23, severe MR, severe pulmonary HTN   - c/w home medications; verified with pt at the bedside   - Monocytes 17% -> f/u with PCP and Heme/Onc outpt   - Other patchy bilateral groundglass opacities are unchanged since 3/29/2023 -> f/u outpt  - Cardiomegaly, 4+ TR -> f/u outpt   - Palliative care evaluation     DVT ppx: Xarelto 10 mg (for 33 more days given recent hip fracture -> will need to discuss if pt should be continued on anticoagulation for A fib after 33 days or not, she is a fall risk and was off anticoagulation previously)   Code status: The patient is A+Ox0 at the time of my evaluation and does not have full capacity to make an informed decision or good understanding of the risks associated with the decision being made. I spoke to her listed emergency contact who is her neice. As far as she is aware the pt does not have a HCP and she is not aware of the pt's goals of care. Her  has a history of Parkinson's and is hard of hearing. I recommended having a goals of care discussion with the palliative care team given the pt's co-morbidities. She is agreeable to speak with the palliative care team.   Emergency contact: Laz Barajas () or Bernadette Sargent (neice -> [phone]) 88 y/o F presented with cough, desaturation to 90%     1. Acute hypoxic respiratory distress (at rehab facility) secondary to aspiration pneumonia   - Admit to med/surg observation with continuous pulse ox   - SpO2 90% on room air at [facility] -> 99% on 5L nasal cannula   - Titrate off O2 as tolerated   - Does not meet SIRS criteria   - s/p Aztreonam, Clindamycin, Vancomycin in ER; c/w Aztreonam (allergies to Penicillin and Azithromycin)   - f/u BCx x 2, sputum culture; adjust abx based on sensitivities  - CT chest: Interval bronchial impaction of the anterior basal right lower lobe bronchus with new small ill-defined nodule, may be sequela of recent choking episode/aspiration  - Suction, aspiration precautions, chest PT, dysphagia evaluation   - NPO until dysphagia evaluation completed    - Trend WBC, monitor for temperatures   - Tylenol for temperatures PRN   - s/p 1L of NS, c/w 75 ml/hr overnight (monitor for fluid overload)   - Monitor BP closely   - ID consult - Dr. Beckman     2. Acute metabolic encephalopathy likely secondary to infectious etiology vs. hospital acquired delirium vs. progression of dementia vs. metabolic vs. neurologic   - CT head: no acute intracranial abnormality   - Ordered B12, folate, TSH, UA, UCx   - NPO until dysphagia evaluation completed   - PT evaluation     3. Recent right hip fracture s/p closed reduction and percutaneous pinning of right hip on 3/29/23  - Xarelto 10 mg (for 33 more days given recent hip fracture -> will need to discuss if pt shoudl be continued on anticoagulation for A fib after 33 days or not, she is a fall risk and was off anticoagulation previously)   - Pt has sutures at site, will need removal in 1 week     4. Elevated lacic acid on admission   - Lactate 2.3 -> 1.5 after IVF     5. Normocytic anemia   - Hb 11.4 (baseline ~12), monitor closely     6. Thrombocytopenia   - , trend     7. Hyperglycemia   - Glucose 121, monitor     8. Elevated alkaline phosphatase and AST   - Alkaline phosphatase 134, AST 47, trend   - If persistent elevation in transaminases, will order a RUQ US    9. History of A fib, dementia (AAOx2), HTN, GERD, right hip fracture s/p closed reduction and percutaneous pinning of right hip on 3/29/23, severe MR, severe pulmonary HTN   - c/w home medications; verified with pt at the bedside   - Monocytes 17% -> f/u with PCP and Heme/Onc outpt   - Other patchy bilateral groundglass opacities are unchanged since 3/29/2023 -> f/u outpt  - Cardiomegaly, 4+ TR -> f/u outpt   - Palliative care evaluation     DVT ppx: Xarelto 10 mg (for 33 more days given recent hip fracture -> will need to discuss if pt should be continued on anticoagulation for A fib after 33 days or not, she is a fall risk and was off anticoagulation previously)   Code status: The patient is A+Ox0 at the time of my evaluation and does not have full capacity to make an informed decision or good understanding of the risks associated with the decision being made. I spoke to her listed emergency contact who is her neice. As far as she is aware the pt does not have a HCP and she is not aware of the pt's goals of care. Her  has a history of Parkinson's and is hard of hearing. I recommended having a goals of care discussion with the palliative care team given the pt's co-morbidities. She is agreeable to speak with the palliative care team.   Emergency contact: Laz Karl () or Bernadette Sargent (neice -> [phone]) 88 y/o F presented with cough, desaturation to 90%     1. Acute hypoxic respiratory distress (at rehab facility) secondary to aspiration pneumonia   - Admit to med/surg observation with continuous pulse ox   - SpO2 90% on room air at [facility] -> 99% on 5L nasal cannula   - Titrate off O2 as tolerated   - Does not meet SIRS criteria   - s/p Aztreonam, Clindamycin, Vancomycin in ER; c/w Aztreonam (allergies to Penicillin and Azithromycin)   - f/u BCx x 2, sputum culture; adjust abx based on sensitivities  - CT chest: Interval bronchial impaction of the anterior basal right lower lobe bronchus with new small ill-defined nodule, may be sequela of recent choking episode/aspiration  - Suction, aspiration precautions, chest PT, dysphagia evaluation   - NPO until dysphagia evaluation completed    - Trend WBC, monitor for temperatures   - Tylenol for temperatures PRN   - s/p 1L of NS, c/w 75 ml/hr overnight (monitor for fluid overload)   - Monitor BP closely   - ID consult - Dr. Beckman     2. Acute metabolic encephalopathy likely secondary to infectious etiology vs. hospital acquired delirium vs. progression of dementia vs. metabolic vs. neurologic   - CT head: no acute intracranial abnormality   - Ordered B12, folate, TSH, UA, UCx   - NPO until dysphagia evaluation completed   - PT evaluation     3. Recent right hip fracture s/p closed reduction and percutaneous pinning of right hip on 3/29/23  - Xarelto 10 mg (for 33 more days given recent hip fracture -> will need to discuss if pt shoudl be continued on anticoagulation for A fib after 33 days or not, she is a fall risk and was off anticoagulation previously)   - Pt has sutures at site, will need removal in 1 week     4. Elevated lacic acid on admission   - Lactate 2.3 -> 1.5 after IVF     5. Normocytic anemia   - Hb 11.4 (baseline ~12), monitor closely     6. Thrombocytopenia   - , trend     7. Hyperglycemia   - Glucose 121, monitor     8. Elevated alkaline phosphatase and AST   - Alkaline phosphatase 134, AST 47, trend   - If persistent elevation in transaminases, will order a RUQ US    9. History of A fib, dementia (AAOx2), HTN, GERD, right hip fracture s/p closed reduction and percutaneous pinning of right hip on 3/29/23, severe MR, severe pulmonary HTN   - c/w home medications; verified with pt at the bedside   - Monocytes 17% -> f/u with PCP and Heme/Onc outpt   - Other patchy bilateral groundglass opacities are unchanged since 3/29/2023 -> f/u outpt  - Cardiomegaly, 4+ TR -> f/u outpt   - Palliative care evaluation     DVT ppx: Xarelto 10 mg (for 33 more days given recent hip fracture -> will need to discuss if pt should be continued on anticoagulation for A fib after 33 days or not, she is a fall risk and was off anticoagulation previously)   Code status: The patient is A+Ox0 at the time of my evaluation and does not have full capacity to make an informed decision or good understanding of the risks associated with the decision being made. I spoke to her listed emergency contact who is her neice. As far as she is aware the pt does not have a HCP and she is not aware of the pt's goals of care. Her  has a history of Parkinson's and is hard of hearing. I recommended having a goals of care discussion with the palliative care team given the pt's co-morbidities. She is agreeable to speak with the palliative care team.   Emergency contact: Laz Barajas () or Bernadette Sargent niece -> [phone])    I spent a total of 80 minutes on the date of this encounter coordinating the patient's care. This includes reviewing prior documentation, results and imaging in addition to completing a full history and physical examination on the patient. Further tests, medications, and procedures have been ordered as indicated. Laboratory results and the plan of care were communicated to the patient and/or their family member. Supporting documentation was completed and added to the patient's chart.

## 2023-04-02 NOTE — H&P ADULT - NSHPPHYSICALEXAM_GEN_ALL_CORE
ICU Vital Signs Last 24 Hrs  T(C): 37.9 (02 Apr 2023 21:02), Max: 37.9 (02 Apr 2023 21:02)  T(F): 100.3 (02 Apr 2023 21:02), Max: 100.3 (02 Apr 2023 21:02)  HR: 70 (02 Apr 2023 21:02) (69 - 71)  BP: 155/88 (02 Apr 2023 21:02) (134/76 - 155/88)  RR: 19 (02 Apr 2023 21:02) (18 - 19)  SpO2: 98% (02 Apr 2023 21:02) (95% - 99%)    O2 Parameters below as of 02 Apr 2023 21:02  Patient On (Oxygen Delivery Method): nasal cannula  O2 Flow (L/min): 5    General: Awake and alert, cooperative with exam. No acute distress.   Skin: Warm, dry, and pink.   Eyes: Pupils equal and reactive to light. Extraocular eye movements intact. No conjunctival injection, discharge, or scleral icterus.   HEENT: Atraumatic, normocephalic. Moist mucus membranes.   Cardiology: Normal S1, S2. Systolic ejection murmur. Regular rate and rhythm.   Respiratory: Rhonchi diffusely b/l. Normal chest expansion.   Gastrointestinal: Positive bowel sounds. Soft, non-tender, non-distended. No guarding, rigidity, or rebound tenderness. No hepatosplenomegaly.   Musculoskeletal: Decreased motor strength b/l. Right incisions clean, dry, intact, sutures at site.   Extremities: No peripheral edema bilaterally. Dorsalis pedis pulses 2+ bilaterally.   Neurological: A+Ox0 (person, place, and time) with confusion. Follows some commands. No facial droop. No slurred speech.

## 2023-04-02 NOTE — ED ADULT NURSE NOTE - CHIEF COMPLAINT QUOTE
Pt BIBA from [facility] ( R neck femur fx) for episode of chocking. Pt sent to ED as staff worried pt aspirated. Pt successfully suctioned in the nursing home, hypoxic initially in [facility], now in no respiratory distress in triage on room air.

## 2023-04-02 NOTE — H&P ADULT - CONVERSATION DETAILS
The patient is A+Ox0 at the time of my evaluation and does not have full capacity to make an informed decision or good understanding of the risks associated with the decision being made. I spoke to her listed emergency contact who is her niece. As far as she is aware the pt does not have a HCP and she is not aware of the pt's goals of care. Her  has a history of Parkinson's and is hard of hearing. I recommended having a goals of care discussion with the palliative care team given the pt's co-morbidities. She is agreeable to speak with the palliative care team.

## 2023-04-02 NOTE — ED PROVIDER NOTE - OBJECTIVE STATEMENT
86 y/o female with PMHx of HTN, GERD, Afib, migraine presents to the ED from [facility] s/p choking episode. Per triage, pt had a choking episode and [facility] staff was concern for aspiration. Pt found to be hypoxic upon triage. Pt is a poor historian. Unable to provide history and not following commands. 86 y/o female with PMHx of HTN, GERD, Afib, migraine presents to the ED from [facility] s/p choking episode. Per triage, pt had a choking episode and [facility] staff was concerned for aspiration. Pt found to be hypoxic upon triage. Pt is a poor historian. Unable to provide history and not following commands.

## 2023-04-02 NOTE — ED CLERICAL - NS ED CLERK NOTE PRE-ARRIVAL INFORMATION; ADDITIONAL PRE-ARRIVAL INFORMATION
This patient is enrolled in the comprehensive joint replacement (CJR) program and has active care navigation.  This patient can be followed up by the care navigation team within 24 hours. To arrange close follow-up or to obtain additional clinical information about this patient, please call the contact number above. Please call the hospitalist for medical consultation ([phone]) PRIOR to admission or observation.  The hospitalist is part of the care team and can assist in acute medical management. Please call the orthopedic team () for ALL patients who require admission.

## 2023-04-02 NOTE — ED PROVIDER NOTE - DIFFERENTIAL DIAGNOSIS
Patient AAOx0 or AAOx1 at baseline, with cough, expectoration of phlegm, nursing home residence concerned for possible aspiration pneumonia, r/o PNA, aspiration pneumonia, CT shows mucosal plugging, possible pneumonia, patient with hypoxia, elevated lactic acid. Consulted ICU, patient admitted for further inpatient care. Differential Diagnosis

## 2023-04-02 NOTE — ED PROVIDER NOTE - NEUROLOGICAL, MLM
AAOx0 or at best AAOx1. Able to move upper and lower extremities. Unable to assess NIH as pt is unable to follow commands at this point. GCS 14.

## 2023-04-02 NOTE — H&P ADULT - NSICDXFAMILYHX_GEN_ALL_CORE_FT
FAMILY HISTORY:  Father  Still living? Unknown  FH: Alzheimers disease, Age at diagnosis: Age Unknown

## 2023-04-02 NOTE — ED PROVIDER NOTE - PROGRESS NOTE DETAILS
Jil Torres for Dr. Calderon: Spoke with ICU PA, patient currently not hypoxic on 2L O2. Patient heart rate is normal. Patient coughing and expirating phlegm. CT shows evidence of possible mucosal impaction vs aspiration pneumonia. No acute intervention for ICU, will admit to medical services pending blood work. Jil Torres for Dr. Calderon: Spoke with ICU PA, patient currently not hypoxic on 2L O2. Patient heart rate is normal. Patient coughing and expectorating phlegm. CT shows evidence of possible mucosal impaction vs aspiration pneumonia. No acute intervention for ICU, will admit to medical services pending blood work.

## 2023-04-02 NOTE — ED ADULT NURSE NOTE - OBJECTIVE STATEMENT
Pt BIBA from [facility] ( R neck femur fx) for episode of chocking. Pt sent to ED as staff worried pt aspirated. Pt successfully suctioned in the nursing home, hypoxic initially in [facility], now in no respiratory distress in triage on room air. Pt is confused and unable to describe events of what occurred when having her choking episode. Pt is speaking incoherently and is aggressive. Pt is on 3 LPM of O2, SAT 97%. Pt is not in respiratory distress. Pt resting on stretcher with comfort and safety measures in place.

## 2023-04-02 NOTE — ED PROVIDER NOTE - MUSCULOSKELETAL, MLM
Moderate b/l lower extremity swelling. Able to move upper and lower extremities.  2+ pulses in b/l DP and radial arteries.

## 2023-04-02 NOTE — H&P ADULT - HISTORY OF PRESENT ILLNESS
88 y/o F with PMH A fib, dementia (A+Ox1 to 2), HTN, GERD, right hip fracture s/p closed reduction and percutaneous pinning of right hip on 3/29/23, severe MR, severe pulmonary HTN, 4+ TR presented with cough, desaturation to 90% on room air, sent from [facility] to r/o aspiration. The pt is A+Ox0 at the time of my evaluation and unable to provide any additonal history. I spoke to the pt's neice over the phone who stated that she visited the pt yesterday and she was congested and coughing which was not the case prior to surgery. She has been increasingly confused and restless.    Prior admission:   - 3/31/23: Right hip fracture transferred to [facility]     ER course: VSS. Labs: Hb 11.4 (baseline ~12), , monocytes 17%, INR 1.44, lactate 2.3 -> 1.5, glucose 121, alkaline phosphatase 134, AST 47. COVID and RVP negative.     EKG: pending, f/u results     Imaging:   - CT chest: Patulous, debris-filled esophagus. No retained radiopaque foreign body.  Interval bronchial impaction of the anterior basal right lower lobe bronchus with new small ill-defined nodule, may be sequela of recent choking episode/aspiration. Other patchy bilateral groundglass opacities are unchanged since 3/29/2023. Cardiomegaly, massive biatrial enlargement. Thoracic kyphosis.   - CT head:  No acute intracranial abnormality.  - CT abd/pelvis: No bowel obstruction. Nondistended stomach.    Pt was given Aztreonam, Clindamycin, Vancomycin, 1L of NS. She is being placed on  med/surg observation with continuous pulse ox for further management.  88 y/o F with PMH A fib, dementia (A+Ox1 to 2), HTN, GERD, right hip fracture s/p closed reduction and percutaneous pinning of right hip on 3/29/23, severe MR, severe pulmonary HTN, 4+ TR presented with cough, desaturation to 90% on room air, sent from [facility] to r/o aspiration. The pt is A+Ox0 at the time of my evaluation and unable to provide any additional history. I spoke to the pt's niece over the phone who stated that she visited the pt yesterday and she was congested and coughing which was not the case prior to surgery. She has been increasingly confused and restless.    Prior admission:   - 3/31/23: Right hip fracture transferred to [facility]     ER course: VSS. Labs: Hb 11.4 (baseline ~12), , monocytes 17%, INR 1.44, lactate 2.3 -> 1.5, glucose 121, alkaline phosphatase 134, AST 47. COVID and RVP negative.     EKG: pending, f/u results     Imaging:   - CT chest: Patulous, debris-filled esophagus. No retained radiopaque foreign body.  Interval bronchial impaction of the anterior basal right lower lobe bronchus with new small ill-defined nodule, may be sequela of recent choking episode/aspiration. Other patchy bilateral groundglass opacities are unchanged since 3/29/2023. Cardiomegaly, massive biatrial enlargement. Thoracic kyphosis.   - CT head:  No acute intracranial abnormality.  - CT abd/pelvis: No bowel obstruction. Nondistended stomach.    Pt was given Aztreonam, Clindamycin, Vancomycin, 1L of NS. She is being placed on  med/surg observation with continuous pulse ox for further management.

## 2023-04-02 NOTE — ED PROVIDER NOTE - CLINICAL SUMMARY MEDICAL DECISION MAKING FREE TEXT BOX
Pt initially hypoxic in triage at 95% at room. Without oxygen supplementation sats go down to 92%. Clinically suspicion for aspiration PNA vs PNA. Supplemental oxygenation improves saturation to 100%. CT of chest, basic blood work, reassessment. Pt initially hypoxic in triage at 95% at room. Without oxygen supplementation sats go down to 92%. Clinically suspicion for aspiration PNA vs PNA. Supplemental oxygenation improves saturation to 100%. CT of chest, basic blood work, and admission.    Patient AAOx0 or AAOx1 at baseline, with cough, expectoration of phlegm, nursing home residence concerned for possible aspiration pneumonia, r/o PNA, aspiration pneumonia, CT shows mucosal plugging, possible pneumonia, patient with hypoxia, elevated lactic acid. Consulted ICU, patient admitted for futher inpatient care.

## 2023-04-02 NOTE — PATIENT PROFILE ADULT - FUNCTIONAL ASSESSMENT - BASIC MOBILITY 6.
1-calculated by average/Not able to assess (calculate score using [facility] averaging method)

## 2023-04-02 NOTE — PHARMACOTHERAPY INTERVENTION NOTE - COMMENTS
Medication history complete. Medications and allergies reviewed with patient and confirmed with .  Medication history complete. Medications and allergies reviewed with list provided by [facility] and confirmed with .

## 2023-04-02 NOTE — ED PROVIDER NOTE - CRITICAL CARE ATTENDING CONTRIBUTION TO CARE
history elicitation, elicitation of history from residence, EMS report  ordering labs and imaging, reviewing the results  consultation with ICU  multiple repeated observation and assessments as patient is was at ED critically ill

## 2023-04-02 NOTE — ED PROVIDER NOTE - CARDIAC, MLM
Normal rate, regular rhythm.  Heart sounds S1, S2.  No murmurs, rubs or gallops. Normal rate, regular rhythm.  Heart sounds S1, S2.  No rubs or gallops. 2+ pulses in bilateral dp and radial arteries. Cap refill at 3 seconds.

## 2023-04-02 NOTE — ED PROVIDER NOTE - NS_ ATTENDINGSCRIBEDETAILS _ED_A_ED_FT
I Maximo Calderon MD saw and examined the patient. Scribe documented for me and under my supervision. I have modified the scribe's documentation where necessary to reflect my history, physical exam and other relevant documentations pertinent to the care of the patient.

## 2023-04-02 NOTE — ED ADULT NURSE REASSESSMENT NOTE - NS ED NURSE REASSESS COMMENT FT1
Pt care assumed from previous RNMarcy. Pt is disoriented at this time as stated in report. Pt c/o thirst mouth swabbed with sponges due to NPO status. Pt pending vancomycin and speech and swallow consult. VSS. Safety and comfort measures in place at this time.

## 2023-04-02 NOTE — PATIENT PROFILE ADULT - FALL HARM RISK - HARM RISK INTERVENTIONS
Assistance with ambulation/Assistance OOB with selected safe patient handling equipment/Communicate Risk of Fall with Harm to all staff/Discuss with provider need for PT consult/Monitor gait and stability/Provide patient with walking aids - walker, cane, crutches/Reinforce activity limits and safety measures with patient and family/Sit up slowly, dangle for a short time, stand at bedside before walking/Tailored Fall Risk Interventions/Use of alarms - bed, chair and/or voice tab/Visual Cue: Yellow wristband and red socks/Bed in lowest position, wheels locked, appropriate side rails in place/Call bell, personal items and telephone in reach/Instruct patient to call for assistance before getting out of bed or chair/Non-slip footwear when patient is out of bed/[place] to call system/Physically safe environment - no spills, clutter or unnecessary equipment/Purposeful Proactive Rounding/Room/bathroom lighting operational, light cord in reach

## 2023-04-02 NOTE — H&P ADULT - NSHPSOCIALHISTORY_GEN_ALL_CORE
Lives at the [facility] with her  who has Parkinson's disease. Her neice is her 's HCP. The couple does not have any children. No alcohol, drugs, smoking. Lives at the [facility] with her  who has Parkinson's disease. Currently at [facility] for rehab after hip fracture. Her frieda is her 's HCP. The couple does not have any children. No alcohol, drugs, smoking.

## 2023-04-02 NOTE — ED ADULT NURSE NOTE - NSICDXPASTMEDICALHX_GEN_ALL_CORE_FT
PAST MEDICAL HISTORY:  Atrial Fibrillation     GERD (Gastroesophageal Reflux Disease)     Hypertension     Migraine     Pulmonary hypertension

## 2023-04-02 NOTE — ED ADULT NURSE NOTE - NSIMPLEMENTINTERV_GEN_ALL_ED
Implemented All Fall with Harm Risk Interventions:  [place] to call system. Call bell, personal items and telephone within reach. Instruct patient to call for assistance. Room bathroom lighting operational. Non-slip footwear when patient is off stretcher. Physically safe environment: no spills, clutter or unnecessary equipment. Stretcher in lowest position, wheels locked, appropriate side rails in place. Provide visual cue, wrist band, yellow gown, etc. Monitor gait and stability. Monitor for mental status changes and reorient to person, place, and time. Review medications for side effects contributing to fall risk. Reinforce activity limits and safety measures with patient and family. Provide visual clues: red socks.

## 2023-04-02 NOTE — ED ADULT TRIAGE NOTE - CHIEF COMPLAINT QUOTE
Pt BIBA from [facility] ( R neck femur fx) for episode of chocking. Pt sent to ED as staff worried pt aspirated. Pt successfully suctioned in the nursing home, hypoxic initially in [facility], now in no respiratory distress in triage on room air.

## 2023-04-03 DIAGNOSIS — F03.90 UNSPECIFIED DEMENTIA, UNSPECIFIED SEVERITY, WITHOUT BEHAVIORAL DISTURBANCE, PSYCHOTIC DISTURBANCE, MOOD DISTURBANCE, AND ANXIETY: ICD-10-CM

## 2023-04-03 LAB
ADD ON TEST-SPECIMEN IN LAB: SIGNIFICANT CHANGE UP
ALBUMIN SERPL ELPH-MCNC: 3.1 G/DL — LOW (ref 3.3–5)
ALP SERPL-CCNC: 125 U/L — HIGH (ref 40–120)
ALT FLD-CCNC: 53 U/L — SIGNIFICANT CHANGE UP (ref 12–78)
ANION GAP SERPL CALC-SCNC: 6 MMOL/L — SIGNIFICANT CHANGE UP (ref 5–17)
AST SERPL-CCNC: 41 U/L — HIGH (ref 15–37)
BASOPHILS # BLD AUTO: 0.02 K/UL — SIGNIFICANT CHANGE UP (ref 0–0.2)
BASOPHILS NFR BLD AUTO: 0.3 % — SIGNIFICANT CHANGE UP (ref 0–2)
BILIRUB SERPL-MCNC: 1.2 MG/DL — SIGNIFICANT CHANGE UP (ref 0.2–1.2)
BUN SERPL-MCNC: 22 MG/DL — SIGNIFICANT CHANGE UP (ref 7–23)
CALCIUM SERPL-MCNC: 8.9 MG/DL — SIGNIFICANT CHANGE UP (ref 8.5–10.1)
CHLORIDE SERPL-SCNC: 113 MMOL/L — HIGH (ref 96–108)
CO2 SERPL-SCNC: 23 MMOL/L — SIGNIFICANT CHANGE UP (ref 22–31)
CREAT SERPL-MCNC: 0.49 MG/DL — LOW (ref 0.5–1.3)
EGFR: 92 ML/MIN/1.73M2 — SIGNIFICANT CHANGE UP
EOSINOPHIL # BLD AUTO: 0.06 K/UL — SIGNIFICANT CHANGE UP (ref 0–0.5)
EOSINOPHIL NFR BLD AUTO: 0.8 % — SIGNIFICANT CHANGE UP (ref 0–6)
FOLATE SERPL-MCNC: 9.9 NG/ML — SIGNIFICANT CHANGE UP
GLUCOSE BLDC GLUCOMTR-MCNC: 110 MG/DL — HIGH (ref 70–99)
GLUCOSE SERPL-MCNC: 125 MG/DL — HIGH (ref 70–99)
HCT VFR BLD CALC: 32.6 % — LOW (ref 34.5–45)
HGB BLD-MCNC: 10.8 G/DL — LOW (ref 11.5–15.5)
IMM GRANULOCYTES NFR BLD AUTO: 1.4 % — HIGH (ref 0–0.9)
LYMPHOCYTES # BLD AUTO: 0.69 K/UL — LOW (ref 1–3.3)
LYMPHOCYTES # BLD AUTO: 9.4 % — LOW (ref 13–44)
MAGNESIUM SERPL-MCNC: 1.9 MG/DL — SIGNIFICANT CHANGE UP (ref 1.6–2.6)
MANUAL SMEAR VERIFICATION: SIGNIFICANT CHANGE UP
MCHC RBC-ENTMCNC: 30.3 PG — SIGNIFICANT CHANGE UP (ref 27–34)
MCHC RBC-ENTMCNC: 33.1 GM/DL — SIGNIFICANT CHANGE UP (ref 32–36)
MCV RBC AUTO: 91.3 FL — SIGNIFICANT CHANGE UP (ref 80–100)
MONOCYTES # BLD AUTO: 1.5 K/UL — HIGH (ref 0–0.9)
MONOCYTES NFR BLD AUTO: 20.5 % — HIGH (ref 2–14)
NEUTROPHILS # BLD AUTO: 4.96 K/UL — SIGNIFICANT CHANGE UP (ref 1.8–7.4)
NEUTROPHILS NFR BLD AUTO: 67.6 % — SIGNIFICANT CHANGE UP (ref 43–77)
PLAT MORPH BLD: SIGNIFICANT CHANGE UP
PLATELET # BLD AUTO: 137 K/UL — LOW (ref 150–400)
POTASSIUM SERPL-MCNC: 3.3 MMOL/L — LOW (ref 3.5–5.3)
POTASSIUM SERPL-SCNC: 3.3 MMOL/L — LOW (ref 3.5–5.3)
PROT SERPL-MCNC: 6.4 GM/DL — SIGNIFICANT CHANGE UP (ref 6–8.3)
RBC # BLD: 3.57 M/UL — LOW (ref 3.8–5.2)
RBC # FLD: 14.7 % — HIGH (ref 10.3–14.5)
RBC BLD AUTO: SIGNIFICANT CHANGE UP
SODIUM SERPL-SCNC: 142 MMOL/L — SIGNIFICANT CHANGE UP (ref 135–145)
TSH SERPL-MCNC: 0.67 UU/ML — SIGNIFICANT CHANGE UP (ref 0.34–4.82)
VIT B12 SERPL-MCNC: 1482 PG/ML — HIGH (ref 232–1245)
WBC # BLD: 7.33 K/UL — SIGNIFICANT CHANGE UP (ref 3.8–10.5)
WBC # FLD AUTO: 7.33 K/UL — SIGNIFICANT CHANGE UP (ref 3.8–10.5)

## 2023-04-03 PROCEDURE — 99233 SBSQ HOSP IP/OBS HIGH 50: CPT

## 2023-04-03 PROCEDURE — 99497 ADVNCD CARE PLAN 30 MIN: CPT | Mod: 25

## 2023-04-03 PROCEDURE — 99223 1ST HOSP IP/OBS HIGH 75: CPT

## 2023-04-03 RX ORDER — POTASSIUM CHLORIDE 20 MEQ
10 PACKET (EA) ORAL
Refills: 0 | Status: COMPLETED | OUTPATIENT
Start: 2023-04-03 | End: 2023-04-03

## 2023-04-03 RX ORDER — CEFEPIME 1 G/1
INJECTION, POWDER, FOR SOLUTION INTRAMUSCULAR; INTRAVENOUS
Refills: 0 | Status: DISCONTINUED | OUTPATIENT
Start: 2023-04-03 | End: 2023-04-07

## 2023-04-03 RX ORDER — CEFEPIME 1 G/1
2000 INJECTION, POWDER, FOR SOLUTION INTRAMUSCULAR; INTRAVENOUS EVERY 12 HOURS
Refills: 0 | Status: DISCONTINUED | OUTPATIENT
Start: 2023-04-04 | End: 2023-04-07

## 2023-04-03 RX ORDER — CEFEPIME 1 G/1
2000 INJECTION, POWDER, FOR SOLUTION INTRAMUSCULAR; INTRAVENOUS ONCE
Refills: 0 | Status: COMPLETED | OUTPATIENT
Start: 2023-04-03 | End: 2023-04-03

## 2023-04-03 RX ADMIN — Medication 100 MILLIEQUIVALENT(S): at 10:30

## 2023-04-03 RX ADMIN — LOSARTAN POTASSIUM 100 MILLIGRAM(S): 100 TABLET, FILM COATED ORAL at 15:42

## 2023-04-03 RX ADMIN — ALBUTEROL 2 PUFF(S): 90 AEROSOL, METERED ORAL at 14:17

## 2023-04-03 RX ADMIN — Medication 100 MILLIGRAM(S): at 05:03

## 2023-04-03 RX ADMIN — Medication 25 MILLIGRAM(S): at 15:41

## 2023-04-03 RX ADMIN — SERTRALINE 50 MILLIGRAM(S): 25 TABLET, FILM COATED ORAL at 18:09

## 2023-04-03 RX ADMIN — RIVAROXABAN 10 MILLIGRAM(S): KIT at 18:09

## 2023-04-03 RX ADMIN — ALBUTEROL 2 PUFF(S): 90 AEROSOL, METERED ORAL at 21:24

## 2023-04-03 RX ADMIN — CEFEPIME 100 MILLIGRAM(S): 1 INJECTION, POWDER, FOR SOLUTION INTRAMUSCULAR; INTRAVENOUS at 14:27

## 2023-04-03 RX ADMIN — Medication 100 MILLIEQUIVALENT(S): at 11:43

## 2023-04-03 RX ADMIN — ALBUTEROL 2 PUFF(S): 90 AEROSOL, METERED ORAL at 08:42

## 2023-04-03 NOTE — CONSULT NOTE ADULT - SUBJECTIVE AND OBJECTIVE BOX
Patient is a 87y old  Female who presents with a chief complaint of Pneumonitis due to inhalation of food or vomitus     (03 Apr 2023 11:17)    HPI:  86 y/o F with PMH A fib, dementia (A+Ox1 to 2), HTN, GERD, right hip fracture s/p closed reduction and percutaneous pinning of right hip on 3/29/23, severe MR, severe pulmonary HTN, 4+ TR presented with cough, desaturation to 90% on room air, sent from [facility] to r/o aspiration. The pt is A+Ox0 at the time of my evaluation and unable to provide any additional history. per pts niece she was congested and coughing which was not the case prior to surgery. She has been increasingly confused and restless. Prior admission: 3/31/23: Right hip fracture transferred to [facility] ER course: VSS. Labs: Hb 11.4 (baseline ~12), , monocytes 17%, INR 1.44, lactate 2.3 -> 1.5, glucose 121, alkaline phosphatase 134, AST 47. COVID and RVP negative. Imaging: CT chest: Patulous, debris-filled esophagus. No retained radiopaque foreign body. Interval bronchial impaction of the anterior basal right lower lobe bronchus with new small ill-defined nodule, may be sequela of recent choking episode/aspiration. Other patchy bilateral groundglass opacities are unchanged since 3/29/2023. CT abd/pelvis: No bowel obstruction. Nondistended stomach. Pt was given Aztreonam, Clindamycin, Vancomycin, 1L of NS. She is being placed on med/surg observation with continuous pulse ox for further management.      PMH: as above  PSH: as above  Meds: per reconciliation sheet, noted below  MEDICATIONS  (STANDING):  albuterol    90 MICROgram(s) HFA Inhaler 2 Puff(s) Inhalation every 6 hours  cefepime   IVPB      losartan 100 milliGRAM(s) Oral daily  metoprolol succinate ER 25 milliGRAM(s) Oral daily  multivitamin 1 Tablet(s) Oral daily  rivaroxaban 10 milliGRAM(s) Oral daily  sertraline 50 milliGRAM(s) Oral daily  sodium chloride 0.9%. 1000 milliLiter(s) (75 mL/Hr) IV Continuous <Continuous>  tiotropium 2.5 MICROgram(s) Inhaler 2 Puff(s) Inhalation daily    Allergies    penicillin (Anaphylaxis)  Zithromax (Hives)    Intolerances      Social: no smoking, no alcohol, no illegal drugs; no recent travel, no exposure to TB  FAMILY HISTORY:  FH: Alzheimers disease (Father)       no history of premature cardiovascular disease in first degree relatives    ROS: the patient denies fever, no chills, no HA, no dizziness, no sore throat, no blurry vision, no CP, no palpitations, + SOB, + cough, no abdominal pain, no diarrhea, no N/V, no dysuria, no leg pain, no claudication, no rash, no joint aches, no rectal pain or bleeding, no night sweats    All other systems reviewed and are negative    Vital Signs Last 24 Hrs  T(C): 36.5 (03 Apr 2023 08:15), Max: 37.9 (02 Apr 2023 21:02)  T(F): 97.7 (03 Apr 2023 08:15), Max: 100.3 (02 Apr 2023 21:02)  HR: 70 (03 Apr 2023 08:45) (69 - 76)  BP: 141/71 (03 Apr 2023 08:15) (134/76 - 155/88)  BP(mean): --  RR: 19 (03 Apr 2023 08:15) (18 - 20)  SpO2: 95% (03 Apr 2023 08:15) (95% - 100%)    Parameters below as of 03 Apr 2023 08:15  Patient On (Oxygen Delivery Method): nasal cannula  O2 Flow (L/min): 3    Daily Height in cm: 157.48 (02 Apr 2023 13:53)    Daily     PE:  Constitutional: frail looking  HEENT: NC/AT, EOMI, PERRLA, conjunctivae clear; ears and nose atraumatic; pharynx benign  Neck: supple; thyroid not palpable  Back: no tenderness  Respiratory: decreased breath sounds   Cardiovascular: S1S2 regular, no murmurs  Abdomen: soft, not tender, not distended, positive BS; liver and spleen WNL  Genitourinary: no suprapubic tenderness  Lymphatic: no LN palpable  Musculoskeletal: no muscle tenderness, no joint swelling or tenderness  Extremities: no pedal edema  Neurological/ Psychiatric: no focal deficits   Skin: no rashes; no palpable lesions    Labs: all available labs reviewed                        10.8   7.33  )-----------( 137      ( 03 Apr 2023 07:11 )             32.6     04-03    142  |  113<H>  |  22  ----------------------------<  125<H>  3.3<L>   |  23  |  0.49<L>    Ca    8.9      03 Apr 2023 07:11  Mg     1.9     04-03    TPro  6.4  /  Alb  3.1<L>  /  TBili  1.2  /  DBili  x   /  AST  41<H>  /  ALT  53  /  AlkPhos  125<H>  04-03     LIVER FUNCTIONS - ( 03 Apr 2023 07:11 )  Alb: 3.1 g/dL / Pro: 6.4 gm/dL / ALK PHOS: 125 U/L / ALT: 53 U/L / AST: 41 U/L / GGT: x                 Radiology: all available radiological tests reviewed  < from: CT Abdomen and Pelvis No Cont (04.02.23 @ 16:06) >    ACC: [id number] EXAM:  CT ABDOMEN AND PELVIS   ORDERED BY: SOLA JULIAN     PROCEDURE DATE:  04/02/2023          INTERPRETATION:  CLINICAL INFORMATION: Possible aspiration. Dilated   esophagus on chest.    COMPARISON: CT chest of earlier the same date.    CONTRAST/COMPLICATIONS:  IV Contrast: NONE  Oral Contrast: NONE  Complications: None reported at time of study completion    PROCEDURE:  CT of the Abdomen and Pelvis was performed.  Sagittal and coronal reformats were performed.    FINDINGS:  LOWER CHEST: Cardiomegaly with massive biatrial enlargement, right   greater than left. Coronary artery calcifications. Trace bilateral   pleural effusions, left greater than right.    LIVER: Within normal limits.  BILE DUCTS: Normal caliber.  GALLBLADDER: Not visualized.  SPLEEN: Within normal limits.  PANCREAS: Within normal limits.  ADRENALS: Within normal limits.  KIDNEYS/URETERS: Within normal limits.    BLADDER: Within normal limits.  REPRODUCTIVE ORGANS: Uterus and adnexa within normal limits.    BOWEL: No bowel obstruction. Stomach is nondistended. Colonic   diverticuli. Appendix is not visualized.  PERITONEUM: No ascites.  VESSELS: Within normal limits.  RETROPERITONEUM/LYMPH NODES: No lymphadenopathy.  ABDOMINAL WALL: Within normal limits.  BONES: Diffusely demineralized. Hemiarthroplasty of the left hip.   Fixation screws of the proximal right femur. Levoscoliosis of lumbar   spine with multilevel degenerative changes.    IMPRESSION:  No bowel obstruction. Nondistended stomach.      < end of copied text >  < from: CT Head No Cont (04.02.23 @ 15:58) >  ACC: [id number] EXAM:  CT BRAIN   ORDERED BY: SOLA JULIAN     PROCEDURE DATE:  04/02/2023          INTERPRETATION:  PROCEDURE: CT brain without intravenous contrast    INDICATION: Possible aspiration    TECHNIQUE: Multiple axial images were obtained at 5 mm intervals from the   skull base to the vertex. Sagittal and coronal reformatted images were   obtained from the axial data set. The images were reviewed in brain and   bone windows.    COMPARISON: 3/27/2023    FINDINGS: The CT examination demonstrates the ventricles, cisternal   spaces, and cortical sulci to be within normal limits. There is no   midline shift or extra axial collections. The gray white differentiation   appears within normal limits. There is no intracranial hemorrhage or   acute transcortical infarct. The bony windows demonstrates no fractures.   The visualized paranasal sinuses are within normal limits. The mastoid   air cells are well aerated.    IMPRESSION: No acute intracranial abnormality.    --- End of Report ---        < end of copied text >  < from: CT Chest No Cont (04.02.23 @ 14:51) >    ACC: [id number] EXAM:  CT CHEST   ORDERED BY: NUBIA HUERTA     PROCEDURE DATE:  04/02/2023          INTERPRETATION:  INDICATION: Status post choking    TECHNIQUE: A volumetric CT acquisition of the chest was obtained from the   thoracic inlet to the upper abdomen without the use of intravenous   contrast. Coronal and sagittal reconstructed images are provided.    COMPARISON: Chest CT 3/29/2023    FINDINGS:    Stable small left greater than right pleural effusions. Few patchy   groundglass opacities are unchanged since 3/29/2023. Interval impaction   of the anterior basal right lower lobe bronchus with new small   ill-defined nodule on series 2, image 80.    Mediastinum/Lymph nodes: No thoracic adenopathy. Patulous, debris-filled   esophagus. No retained radiopaque foreign body.    Heart and Vessels: Cardiomegaly with massive biatrial enlargement.   Coronary arterial, aortic valvular and mitral annular calcification. No   pericardial effusion.    Upper Abdomen: Left renal parapelvic cyst.    Osseous structures and Soft Tissues: No aggressive bone lesions.   Exaggerated thoracic kyphosis.    IMPRESSION:  Patulous, debris-filled esophagus. No retained radiopaque foreign body.    Interval bronchial impaction of the anterior basal rightlower lobe   bronchus with new small ill-defined nodule, may be sequela of recent   choking episode/aspiration. Other patchy bilateral groundglass opacities   are unchanged since 3/29/2023.    --- End of Report ---    < end of copied text >    Advanced directives addressed: full resuscitation

## 2023-04-03 NOTE — PHYSICAL THERAPY INITIAL EVALUATION ADULT - MODALITIES TREATMENT COMMENTS
Performed therapeutic exercises well with visual, tactile commands. R hip weak and painful. Patient left in bed as found. B heels offloaded. RN informed of session/status.

## 2023-04-03 NOTE — DIETITIAN INITIAL EVALUATION ADULT - ADD RECOMMEND
1) F/u SLP eval results  2) Advance diet to Regular + SLP recs as soon as medically feasible  3) Will add ensure plus high protein TID to optimize PO intake (provides 350 kcal, 20g protein/ shake) when diet is advanced  4) Obtain vitamin D 25OH level to assess nutriture  5) Please obtain weekly weights  6) Consider adding thiamine 100 mg daily 2/2 poor PO intake/ malnutrition  7) MVI w/ minerals daily to ensure 100% RDA met  8) Encourage protein-rich foods, maximize food preferences  9) Monitor bowel movements, if no BM for >3 days, consider implementing bowel regimen.   10) Consider adding appetite stimulant such as Remeron or Marinol 2/2 chronically poor appetite/ PO intake  11) Confirm goals of care regarding nutrition support - Nutrition support is not recommended due to overall declining medical status which evidenced based studies indicate EN is not effective in prolonging survival and improving quality of life. It can also increase risk of aspiration pneumonia as well as other related issues.  RD will continue to monitor PO intake, labs, hydration, and wt prn.

## 2023-04-03 NOTE — SWALLOW BEDSIDE ASSESSMENT ADULT - PHARYNGEAL PHASE
Swallow trigger was timely to mildly latent and laryngeal lift on palpation during swallowing trials was mildly to moderately reduced but felt to be grossly functional with some of the above modified food consistencies. Post prandial coughing exhibited with thin liquids, despite cues to employ compensatory swallow maneuvers. NO behavioral aspiration signs exhibited with mildly thick liquids, puree food and softer foods that require mastication.

## 2023-04-03 NOTE — PHYSICAL THERAPY INITIAL EVALUATION ADULT - PATIENT PROFILE REVIEW, REHAB EVAL
PMH A fib, dementia (A+Ox1 to 2), HTN, GERD, right hip fracture s/p closed reduction and percutaneous pinning of right hip on 3/29/23, severe MR, severe pulmonary HTN, 4+ TR/yes

## 2023-04-03 NOTE — SWALLOW BEDSIDE ASSESSMENT ADULT - SLP GENERAL OBSERVATIONS
On encounter, a cervical kyphosis was noted. A loss of mass was apparent in pt's strap muscle regions. Pt was somewhat congested. The pt was awake. She was variably anxious at times and internally distractible. Pt periodically verbalized during communicative probes. At these times, pt's motor speech abilities were felt to be grossly functional and her verbalizations were linguistically intact. Of note is that her voice was presbyphonic but felt to be audible and her verbalizations were variably tangential/contextually inappropriate indicative of Cognitive Dysfunction. Cognitive Dysfunction in setting of encephalopathy related to acute medical deconditioning/pneumonia as well as underlying Dementia.

## 2023-04-03 NOTE — PHYSICAL THERAPY INITIAL EVALUATION ADULT - ADDITIONAL COMMENTS
Above from EMR, Patient very [place], not able to answer questions. Last admit to [facility], post CRPP, was able to ambulate 5 feet with RW and assist. Went to [facility].

## 2023-04-03 NOTE — CONSULT NOTE ADULT - CONVERSATION DETAILS
PTs family called to discuss pt currently remain FULL CODE and family needs time to further discuss goc as they are having a hard time making decisions.  to limiting any interventions     Pt. has no children and her only sibling has Alzheimer's. Pts niece shared how pt. usually is confused and since she had surgery on her hip she has seen a bigger decline.  Feelings explored and support provided.    Pts niece reports she is a hospice nurse and is away of that option. We briefly discussed the option of a comfort focus and possibly hospice at the [facility] if the goal was strict comfort. We also discussed pts wishes regarding resuscitation and intubation, as pt. is currently full code.      Will continue to follow

## 2023-04-03 NOTE — SWALLOW BEDSIDE ASSESSMENT ADULT - SWALLOW EVAL: DIAGNOSIS
1) On encounter, a cervical kyphosis was noted. A loss of mass was apparent in pt's strap muscle regions. Pt was somewhat congested. The pt was awake. She was variably anxious at times and internally distractible. Pt periodically verbalized during communicative probes. At these times, pt's motor speech abilities were felt to be grossly functional and her verbalizations were linguistically intact. Of note is that her voice was presbyphonic but felt to be audible and her verbalizations were variably tangential/contextually inappropriate indicative of Cognitive Dysfunction. Cognitive Dysfunction in setting of encephalopathy related to acute medical deconditioning/pneumonia as well as underlying Dementia.

## 2023-04-03 NOTE — SWALLOW BEDSIDE ASSESSMENT ADULT - ORAL PREPARATORY PHASE
Pt was variably inattentive/distractible when PO was offered. Oral aperture was reduced when accepting PO but labial grading on utensils was grossly functional.

## 2023-04-03 NOTE — SWALLOW BEDSIDE ASSESSMENT ADULT - ASR SWALLOW LINGUAL MOBILITY
Unable to assess due to altered cognition, reduced active participation and variable non compliance.

## 2023-04-03 NOTE — PHYSICAL THERAPY INITIAL EVALUATION ADULT - ACTIVE RANGE OF MOTION EXAMINATION, REHAB EVAL
R hip flexion to 90/90 in sitting./bilateral upper extremity Active ROM was WFL (within functional limits)/bilateral  lower extremity Active ROM was WFL (within functional limits)/deficits as listed below

## 2023-04-03 NOTE — GOALS OF CARE CONVERSATION - ADVANCED CARE PLANNING - CONVERSATION DETAILS
This SW spoke with pts niece and  via phone to further discuss goals of care. Pts  is in [facility]. Pts  surrogate decision maker, he gives permission for team to speak with Bernadette his niece and she will relay information./ Pts niece had updated pts  on all of the information and they discussed code status. Pts family reports pts antibiotics were just changed therefore, they want to give her a little time to see if they make any improvement.     We reviewed code status again. Pt. is currently full code. Pts  reports he still wants to think this over. We reviewed why resuscitation and intubation would not be recommended given pts underlying conditions however, this SW explained we will respect their wishes. Pts  expressed understanding but still wants some time to think this over.     Plan is to continue with current medical management to see if there is any improvement. No limits currently set, family thinking over options. Emotional support provided. Our team will continue to follow.

## 2023-04-03 NOTE — PHYSICAL THERAPY INITIAL EVALUATION ADULT - PERTINENT HX OF CURRENT PROBLEM, REHAB EVAL
88 yo F admitted  with cough, desaturation to 90% on room air, sent from [facility] to r/o aspiration. Prior admission: - 3/31/23: Right hip fracture transferred to [facility] (due to operative risk). D/C'd to [facility].   CT chest: Patulous, debris-filled esophagus. No retained radiopaque foreign body.

## 2023-04-03 NOTE — CHART NOTE - NSCHARTNOTEFT_GEN_A_CORE
HPI:  88 y/o F with PMH A fib, dementia (A+Ox1 to 2), HTN, GERD, right hip fracture s/p closed reduction and percutaneous pinning of right hip on 3/29/23, severe MR, severe pulmonary HTN, 4+ TR presented with cough, desaturation to 90% on room air, sent from [facility] to r/o aspiration.  (02 Apr 2023 21:23)      PERTINENT PMH REVIEWED:  [ X ] YES [ ] NO           Primary Contact:     Pts  is surrogate however, niece is listed as the primary contact as pts  has his own health issues     HCP [  ] Surrogate [  X ] Guardian [   ]    Mental Status: [ ] Alert  [  ] Oriented [  ] Confused [ X ] Lethargic [  ]  Concerns of Depression [  ]- Not identified   Anxiety [   ]- not identified   Baseline ADLs (prior to admission):  Independent [ ] moderately [ ] fully   Dependent   [ X ] moderately [ ] fully    Family Meeting: Follow up discussion with pts  and niece today via phone at 2:30PM    Anticipated Grief: Patient [  ] Family [ X ]    Caregiver [place] Assessed: Yes [ X ] No [  ]    Latter day: Zoroastrian     Spiritual Concerns: Not identified     Goals of Care: to be determined    Previous Services: [facility] for 1 day. [facility] prior to     ADVANCE DIRECTIVES: Pt. is Full Code, will address further at family meeting    Anticipated D/C Plan: to be further determined                      Summary:    This SW spoke with pts humberto Fleming via phone to discuss goals of care, assist with planning and provide supportive counseling. Pts jose rece shared how pt. was recently at the hospital was d/c to [facility] for one day and came back to the hospital. Prior to this pt. has been a resident at [facility] with her . Pts niece reports pts  is able to have a conversation however, he has his own medical issues therefore, pts niece has been receiving all of the information and relaying to him. Pt. has no children and her only sibling has Alzheimer's. Pts niece shared how pt. usually is confused and since she had surgery on her hip she has seen a bigger decline.  Feelings explored and support provided.    We began to discuss goals of care. Pts niece reports she is a hospice nurse and is away of that option. We briefly discussed the option of a comfort focus and possibly hospice at the [facility] if the goal was strict comfort. We also discussed pts wishes regarding resuscitation and intubation, as pt. is currently full code. Pts niece shared that she doesn't want to make these big decisions without her Uncle being apart of it. We scheduled a phone meeting for 2:30 today when pts niece will be with pts  so we can all discuss together.     Plan to be further determined. Follow up phone discussion with pts  and niece at 2:30 today. Emotional support provided. Our team will continue to follow.

## 2023-04-03 NOTE — DIETITIAN INITIAL EVALUATION ADULT - PERTINENT LABORATORY DATA
04-03    142  |  113<H>  |  22  ----------------------------<  125<H>  3.3<L>   |  23  |  0.49<L>    Ca    8.9      03 Apr 2023 07:11  Mg     1.9     04-03    TPro  6.4  /  Alb  3.1<L>  /  TBili  1.2  /  DBili  x   /  AST  41<H>  /  ALT  53  /  AlkPhos  125<H>  04-03  POCT Blood Glucose.: 110 mg/dL (04-03-23 @ 09:04)  A1C with Estimated Average Glucose Result: 5.8 % (03-29-23 @ 07:09)

## 2023-04-03 NOTE — DIETITIAN INITIAL EVALUATION ADULT - NSICDXPASTMEDICALHX_GEN_ALL_CORE_FT
PAST MEDICAL HISTORY:  Atrial Fibrillation     GERD (Gastroesophageal Reflux Disease)     Hip fracture     History of repair of hip fracture     Hypertension     Migraine     Pulmonary hypertension

## 2023-04-03 NOTE — PROGRESS NOTE ADULT - ASSESSMENT
Acute hypoxic respiratory distress (at rehab facility) secondary to aspiration pneumonia   - continuous pulse ox   - SpO2 90% on room air at [facility] -> 99% on 5L nasal cannula   - Titrate off O2 as tolerated   - Does not meet SIRS criteria   - s/p Aztreonam, Clindamycin, Vancomycin in ER; c/w Aztreonam (allergies to Penicillin and Azithromycin)   - f/u BCx x 2, sputum culture  - CT chest: Interval bronchial impaction of the anterior basal right lower lobe bronchus with new small ill-defined nodule, may be sequela of recent choking episode/aspiration  - speech and swallow eval noted   - ID consult appreciated, continue cefepime 2g q12h     Acute metabolic encephalopathy likely secondary to infectious etiology vs. hospital acquired delirium vs. progression of dementia vs. metabolic vs. neurologic   - CT head: no acute intracranial abnormality   - B12, folate, TSH normal   - F/u UA, UCx   - PT evaluation     Recent right hip fracture s/p closed reduction and percutaneous pinning of right hip on 3/29/23  - Xarelto 10 mg (for 33 more days given recent hip fracture -> will need to discuss if pt should  be continued on anticoagulation for A fib after 33 days or not, she is a fall risk and was off anticoagulation previously)   - Pt has sutures at site, will need removal in 1 week     Elevated lacic acid on admission   - Lactate 2.3 -> 1.5 after IVF     Normocytic anemia   - Hb 11.4 (baseline ~12), monitor closely     Thrombocytopenia   - , trend     Hyperglycemia   - Glucose 121, monitor     Elevated alkaline phosphatase and AST   - Improving     History of A fib, dementia (AAOx2), HTN, GERD, right hip fracture s/p closed reduction and percutaneous pinning of right hip on 3/29/23, severe MR, severe pulmonary HTN   - c/w home medications  - Monocytes 17% -> f/u with PCP and Heme/Onc outpt   - Other patchy bilateral groundglass opacities are unchanged since 3/29/2023 -> f/u outpt  - Cardiomegaly, 4+ TR -> f/u outpt   - Palliative care evaluation     Severe protein-calorie malnutrition  - nutrition consult appreciated

## 2023-04-03 NOTE — SWALLOW BEDSIDE ASSESSMENT ADULT - ORAL PHASE
Bolus formation/transfer were mildly to moderately prolonged and discontinuous but felt to be grossly mechanically functional with some of the above modified food consistencies. Piecemeal deglutition was evident. Mild to moderate tongue debris noted with soft/bite size food.

## 2023-04-03 NOTE — DIETITIAN INITIAL EVALUATION ADULT - OTHER INFO
88 y/o F with a PMHx of A fib, dementia (A+Ox1 to 2), HTN, GERD, right hip fracture s/p closed reduction and percutaneous pinning of right hip on 3/29/23, severe MR, severe pulmonary HTN, 4+ TR presented with cough, desaturation to 90% on room air, sent from [facility] to r/o aspiration. The pt is A+Ox0 at the time of evaluation and unable to provide any additional history. Spoke to the pt's niece over the phone who stated that she visited the pt yesterday and she was congested and coughing which was not the case prior to surgery. She has been increasingly confused and restless. Admitted for acute hypoxic respiratory distress (at rehab facility) secondary to aspiration pneumonia. Palliative care following.     Unable to obtain meaningful information 2/2 mental status; pt currently NPO at time of visit - SLP eval pending. RD obtained bedscale wt on 4/3 - 109#; no weight hx to review. Pt thin, frail, mahendra, and emaciated appearing. NFPE reveals severe muscle/ fat wasting, pt meets criteria for PCM at this time. Recommend to advance diet to Regular + SLP recs as soon as medically feasible. Will order Ensure Plus High Protein TID in effort to optimize PO intake. Nutrition support is not recommended due to overall declining medical status which evidenced based studies indicate EN is not effective in prolonging survival and improving quality of life. It can also increase risk of aspiration pneumonia as well as other related issues. Consider adding appetite stimulant such as Remeron or Marinol 2/2 chronically poor appetite/ PO intake. Please see additional recommendations below.

## 2023-04-03 NOTE — SWALLOW BEDSIDE ASSESSMENT ADULT - NS SPL SWALLOW CLINIC TRIAL FT
Pt exhibited Oropharyngeal Dysphagia which subjectively appeared to be a grossly functional condition with a restricted inventory of modified food textures. Overt aspiration signs/coughing demonstrated with thin liquids. Dysphagia in setting of acute medical deconditioning/encephalopathy/pneumonia, some muscle wasting and underlying Dementia. Pt exhibited Oropharyngeal Dysphagia which subjectively appeared to be a grossly functional condition with a restricted inventory of modified food textures. Overt aspiration signs/coughing demonstrated with thin liquids. Dysphagia in setting of acute medical deconditioning/encephalopathy/pneumonia, some muscle wasting and underlying Dementia. Coarse solids not offered given severity of Dysphagia.

## 2023-04-03 NOTE — DIETITIAN INITIAL EVALUATION ADULT - FACTORS AFF FOOD INTAKE
change in mental status/difficulty feeding self/difficulty swallowing/persistent lack of appetite/NPO

## 2023-04-03 NOTE — PHYSICAL THERAPY INITIAL EVALUATION ADULT - MANUAL MUSCLE TESTING RESULTS, REHAB EVAL
[place], not able to follow MMT commands.  B UEs:  grossly 3+/5, B LEs: grossly 3+/5, except R hip flexors: 2-/5./grossly assessed due to

## 2023-04-03 NOTE — SWALLOW BEDSIDE ASSESSMENT ADULT - COMMENTS
The pt was admitted to  from [facility] with cough/congestion and was diagnosed with pneumonia. I spoke with pt's niece who indicated that patient is s/p a right hip fracture and underwent a repair last week. Since that time, the pt has reportedly been congested as per niece. Other selected prior medical history is remarkable for Dementia, atrial fibrillation, pulmonary hypertension, mitral regurgitation, tricuspid regurgitation, migraines, GERD, prior right cataract extraction and past surgeries to address breast/ovarian cysts.

## 2023-04-03 NOTE — SWALLOW BEDSIDE ASSESSMENT ADULT - ASR SWALLOW RECOMMEND DIAG
DEFER MBS AS PT APPEARED CLINICALLY TOLERANT OF SUGGESTED PO TEXTURES FROM AN OROPHARYNGEAL SWALLOWING STANCE ON EXAM, DYSPHAGIA WITH A PROPENSITY TO FLUCTUATE IN SEVERITY GIVEN PROFILE AND CONSIDERING HER ALTERED MENTATION.

## 2023-04-03 NOTE — DIETITIAN INITIAL EVALUATION ADULT - ORAL INTAKE PTA/DIET HISTORY
Unable to obtain intake/ diet hx pta 2/2 mental status. Admitted from [facility].

## 2023-04-03 NOTE — SWALLOW BEDSIDE ASSESSMENT ADULT - SWALLOW EVAL: PROGNOSIS
2) Pt exhibits Oropharyngeal Dysphagia which subjectively appeared to be a grossly functional condition with a restricted inventory of modified food textures. Overt aspiration signs/coughing demonstrated with thin liquids. Dysphagia in setting of acute medical deconditioning/encephalopathy/pneumonia, some muscle wasting and underlying Dementia.

## 2023-04-03 NOTE — GOALS OF CARE CONVERSATION - ADVANCED CARE PLANNING - SURROGATE NAME
pts  surrogate decision maker, he gives permission for team to speak with Bernadette his niece and she will relay information

## 2023-04-03 NOTE — DIETITIAN INITIAL EVALUATION ADULT - PERTINENT MEDS FT
MEDICATIONS  (STANDING):  albuterol    90 MICROgram(s) HFA Inhaler 2 Puff(s) Inhalation every 6 hours  aztreonam  IVPB 2000 milliGRAM(s) IV Intermittent every 8 hours  losartan 100 milliGRAM(s) Oral daily  metoprolol succinate ER 25 milliGRAM(s) Oral daily  multivitamin 1 Tablet(s) Oral daily  potassium chloride  10 mEq/100 mL IVPB 10 milliEquivalent(s) IV Intermittent every 1 hour  rivaroxaban 10 milliGRAM(s) Oral daily  sertraline 50 milliGRAM(s) Oral daily  sodium chloride 0.9%. 1000 milliLiter(s) (75 mL/Hr) IV Continuous <Continuous>  tiotropium 2.5 MICROgram(s) Inhaler 2 Puff(s) Inhalation daily    MEDICATIONS  (PRN):  acetaminophen     Tablet .. 650 milliGRAM(s) Oral every 6 hours PRN Temp greater or equal to 38C (100.4F), Mild Pain (1 - 3)  aluminum hydroxide/magnesium hydroxide/simethicone Suspension 30 milliLiter(s) Oral every 4 hours PRN Dyspepsia  melatonin 3 milliGRAM(s) Oral at bedtime PRN Insomnia  ondansetron Injectable 4 milliGRAM(s) IV Push every 8 hours PRN Nausea and/or Vomiting    Home Medications:  acetaminophen 325 mg oral tablet: 2 tab(s) orally every 6 hours as needed for  mild pain (02 Apr 2023 20:29)  ipratropium-albuterol 0.5 mg-2.5 mg/3 mL inhalation solution: 3 milliliter(s) inhaled every 6 hours (02 Apr 2023 16:41)  losartan 100 mg oral tablet: 1 tab(s) orally once a day (02 Apr 2023 16:41)  metoprolol succinate 25 mg oral tablet, extended release: 1 tab(s) orally once a day (02 Apr 2023 16:41)  Multiple Vitamins oral tablet: 1 tab(s) orally once a day (02 Apr 2023 16:41)  rivaroxaban 10 mg oral tablet: 1 tab(s) orally once a day (02 Apr 2023 16:41)  sertraline 50 mg oral tablet: 1 tab(s) orally once a day (02 Apr 2023 16:41)

## 2023-04-03 NOTE — CONSULT NOTE ADULT - SUBJECTIVE AND OBJECTIVE BOX
HPI: Pt is a 87y old Female with hx of       PAIN: ( )Yes   ( )No  Level:  Location:  Intensity:    /10  Quality:  Aggravating Factors:  Alleviating Factors:  Radiation:  Duration/Timing:  Impact on ADLs:    DYSPNEA: ( ) Yes  ( ) No  Level:    PAST MEDICAL & SURGICAL HISTORY:  Migraine      Atrial Fibrillation      Hypertension      GERD (Gastroesophageal Reflux Disease)      Pulmonary hypertension      Hip fracture      History of repair of hip fracture      History of Cataract Surgery  right 2006      Ovarian Cyst      Breast Cyst      History of fracture of right hip          SOCIAL HX:    Hx opiate tolerance ( )YES  ( )NO    Baseline ADLs  (Prior to Admission)  ( ) Independent   ( )Dependent    FAMILY HISTORY:  FH: Alzheimers disease (Father)        Review of Systems:    Anxiety-  Depression-  Physical Discomfort-  Dyspnea-  Constipation-  Diarrhea-  Nausea-  Vomiting-  Anorexia-  Weight Loss-   Cough-  Secretions-  Fatigue-  Weakness-  Delirium-    All other systems reviewed and negative  Unable to obtain/Limited due to:      PHYSICAL EXAM:    Vital Signs Last 24 Hrs  T(C): 37.1 (02 Apr 2023 21:45), Max: 37.9 (02 Apr 2023 21:02)  T(F): 98.8 (02 Apr 2023 21:45), Max: 100.3 (02 Apr 2023 21:02)  HR: 76 (02 Apr 2023 21:45) (69 - 76)  BP: 146/82 (02 Apr 2023 21:45) (134/76 - 155/88)  BP(mean): --  RR: 20 (02 Apr 2023 21:45) (18 - 20)  SpO2: 100% (02 Apr 2023 21:45) (95% - 100%)    Parameters below as of 02 Apr 2023 21:45  Patient On (Oxygen Delivery Method): nasal cannula  O2 Flow (L/min): 5    Daily Height in cm: 157.48 (02 Apr 2023 13:53)    Daily     PPSV2:   %  FAST:    General:  Mental Status:  HEENT:  Lungs:  Cardiac:  GI:  :  Ext:  Neuro:      LABS:                        10.8   7.33  )-----------( 137      ( 03 Apr 2023 07:11 )             32.6     04-02    139  |  109<H>  |  27<H>  ----------------------------<  121<H>  3.6   |  26  |  0.72    Ca    9.5      02 Apr 2023 16:23    TPro  7.5  /  Alb  3.6  /  TBili  1.1  /  DBili  x   /  AST  47<H>  /  ALT  57  /  AlkPhos  134<H>  04-02    PT/INR - ( 02 Apr 2023 16:23 )   PT: 16.8 sec;   INR: 1.44 ratio         PTT - ( 02 Apr 2023 16:23 )  PTT:30.3 sec  Albumin: Albumin, Serum: 3.6 g/dL (04-02 @ 16:23)      Allergies    penicillin (Anaphylaxis)  Zithromax (Hives)    Intolerances      MEDICATIONS  (STANDING):  albuterol    90 MICROgram(s) HFA Inhaler 2 Puff(s) Inhalation every 6 hours  aztreonam  IVPB 2000 milliGRAM(s) IV Intermittent every 8 hours  losartan 100 milliGRAM(s) Oral daily  metoprolol succinate ER 25 milliGRAM(s) Oral daily  multivitamin 1 Tablet(s) Oral daily  rivaroxaban 10 milliGRAM(s) Oral daily  sertraline 50 milliGRAM(s) Oral daily  sodium chloride 0.9%. 1000 milliLiter(s) (75 mL/Hr) IV Continuous <Continuous>  tiotropium 2.5 MICROgram(s) Inhaler 2 Puff(s) Inhalation daily    MEDICATIONS  (PRN):  acetaminophen     Tablet .. 650 milliGRAM(s) Oral every 6 hours PRN Temp greater or equal to 38C (100.4F), Mild Pain (1 - 3)  aluminum hydroxide/magnesium hydroxide/simethicone Suspension 30 milliLiter(s) Oral every 4 hours PRN Dyspepsia  melatonin 3 milliGRAM(s) Oral at bedtime PRN Insomnia  ondansetron Injectable 4 milliGRAM(s) IV Push every 8 hours PRN Nausea and/or Vomiting      RADIOLOGY/ADDITIONAL STUDIES:   HPI:     "86 y/o F with PMH A fib, dementia (A+Ox1 to 2), HTN, GERD, right hip fracture s/p closed reduction and percutaneous pinning of right hip on 3/29/23, severe MR, severe pulmonary HTN, 4+ TR presented with cough, desaturation to 90% on room air, sent from [facility] to r/o aspiration. The pt is A+Ox0 at the time of my evaluation and unable to provide any additional history."     4/23  pt seen and examind  very confused   w/o capacity at [facility] time  denied pain or sob  no nausea or vomiting  FULL  CODE as family not ready to put any limits at this time      PAIN: ( )Yes   ( )No  Pt unable to characterise d/t clinical status   DYSPNEA: ( ) Yes  ( ) No  Level:    PAST MEDICAL & SURGICAL HISTORY:  Migraine      Atrial Fibrillation      Hypertension      GERD (Gastroesophageal Reflux Disease)      Pulmonary hypertension      Hip fracture      History of repair of hip fracture      History of Cataract Surgery  right 2006      Ovarian Cyst      Breast Cyst      History of fracture of right hip          SOCIAL HX:    Hx opiate tolerance ( )YES  (x )NO    Baseline ADLs  (Prior to Admission)  ( ) Independent   (x )Dependent    FAMILY HISTORY:  FH: Alzheimers disease (Father)        Review of Systems:   Unable to obtain/Limited due to: ams      PHYSICAL EXAM:    Vital Signs Last 24 Hrs  T(C): 37.1 (02 Apr 2023 21:45), Max: 37.9 (02 Apr 2023 21:02)  T(F): 98.8 (02 Apr 2023 21:45), Max: 100.3 (02 Apr 2023 21:02)  HR: 76 (02 Apr 2023 21:45) (69 - 76)  BP: 146/82 (02 Apr 2023 21:45) (134/76 - 155/88)  BP(mean): --  RR: 20 (02 Apr 2023 21:45) (18 - 20)  SpO2: 100% (02 Apr 2023 21:45) (95% - 100%)    Parameters below as of 02 Apr 2023 21:45  Patient On (Oxygen Delivery Method): nasal cannula  O2 Flow (L/min): 5    Daily Height in cm: 157.48 (02 Apr 2023 13:53)    Daily     PPSV2:  30%  FAST:      General: calm in NAD  Mental Status: awake  not oriented  HEENT: eomi, perrl  Lungs: decreased   b/l bs  Cardiac: s1s2 n   GI: soft nontender +BS   Ext: moves all 4 extremities spontaneously  Neuro: no gross findings     LABS:                        10.8   7.33  )-----------( 137      ( 03 Apr 2023 07:11 )             32.6     04-02    139  |  109<H>  |  27<H>  ----------------------------<  121<H>  3.6   |  26  |  0.72    Ca    9.5      02 Apr 2023 16:23    TPro  7.5  /  Alb  3.6  /  TBili  1.1  /  DBili  x   /  AST  47<H>  /  ALT  57  /  AlkPhos  134<H>  04-02    PT/INR - ( 02 Apr 2023 16:23 )   PT: 16.8 sec;   INR: 1.44 ratio         PTT - ( 02 Apr 2023 16:23 )  PTT:30.3 sec  Albumin: Albumin, Serum: 3.6 g/dL (04-02 @ 16:23)      Allergies    penicillin (Anaphylaxis)  Zithromax (Hives)    Intolerances      MEDICATIONS  (STANDING):  albuterol    90 MICROgram(s) HFA Inhaler 2 Puff(s) Inhalation every 6 hours  aztreonam  IVPB 2000 milliGRAM(s) IV Intermittent every 8 hours  losartan 100 milliGRAM(s) Oral daily  metoprolol succinate ER 25 milliGRAM(s) Oral daily  multivitamin 1 Tablet(s) Oral daily  rivaroxaban 10 milliGRAM(s) Oral daily  sertraline 50 milliGRAM(s) Oral daily  sodium chloride 0.9%. 1000 milliLiter(s) (75 mL/Hr) IV Continuous <Continuous>  tiotropium 2.5 MICROgram(s) Inhaler 2 Puff(s) Inhalation daily    MEDICATIONS  (PRN):  acetaminophen     Tablet .. 650 milliGRAM(s) Oral every 6 hours PRN Temp greater or equal to 38C (100.4F), Mild Pain (1 - 3)  aluminum hydroxide/magnesium hydroxide/simethicone Suspension 30 milliLiter(s) Oral every 4 hours PRN Dyspepsia  melatonin 3 milliGRAM(s) Oral at bedtime PRN Insomnia  ondansetron Injectable 4 milliGRAM(s) IV Push every 8 hours PRN Nausea and/or Vomiting      RADIOLOGY/ADDITIONAL STUDIES:

## 2023-04-03 NOTE — SWALLOW BEDSIDE ASSESSMENT ADULT - CONSISTENCIES ADMINISTERED
thin liquid/mildly thick/pureed/minced & moist thin liquid/mildly thick/pureed/minced & moist/soft & bite-sized

## 2023-04-03 NOTE — SWALLOW BEDSIDE ASSESSMENT ADULT - SWALLOW EVAL: SECRETION MANAGEMENT
Limited probes notable for a volitional cough that was somewhat moist and produced with mildly decreased strength.

## 2023-04-03 NOTE — PHYSICAL THERAPY INITIAL EVALUATION ADULT - GENERAL OBSERVATIONS, REHAB EVAL
Patient received in bed on 5S, +PrimaFit, +O2@3L vai nc. Patient very [place], not able to hear at all without her hearing aides, Non verbal indicators of pain were absent at rest.

## 2023-04-03 NOTE — SWALLOW BEDSIDE ASSESSMENT ADULT - SWALLOW EVAL: RECOMMENDED DIET
SUGGEST A MINCED AND MOIST DIET WITH MILDLY THICK LIQUIDS AS THIS IS THE LEAST RESTRICTIVE TOLERABLE DIET CONSISTENCIES FROM AN OROPHARYNGEAL SWALLOWING STANCE ON CLINICAL EXAM.

## 2023-04-04 LAB
A1C WITH ESTIMATED AVERAGE GLUCOSE RESULT: 5.9 % — HIGH (ref 4–5.6)
ADD ON TEST-SPECIMEN IN LAB: SIGNIFICANT CHANGE UP
ANION GAP SERPL CALC-SCNC: 5 MMOL/L — SIGNIFICANT CHANGE UP (ref 5–17)
BASOPHILS # BLD AUTO: 0.01 K/UL — SIGNIFICANT CHANGE UP (ref 0–0.2)
BASOPHILS NFR BLD AUTO: 0.1 % — SIGNIFICANT CHANGE UP (ref 0–2)
BUN SERPL-MCNC: 18 MG/DL — SIGNIFICANT CHANGE UP (ref 7–23)
CALCIUM SERPL-MCNC: 9.1 MG/DL — SIGNIFICANT CHANGE UP (ref 8.5–10.1)
CHLORIDE SERPL-SCNC: 111 MMOL/L — HIGH (ref 96–108)
CO2 SERPL-SCNC: 24 MMOL/L — SIGNIFICANT CHANGE UP (ref 22–31)
CREAT SERPL-MCNC: 0.49 MG/DL — LOW (ref 0.5–1.3)
EGFR: 92 ML/MIN/1.73M2 — SIGNIFICANT CHANGE UP
EOSINOPHIL # BLD AUTO: 0.05 K/UL — SIGNIFICANT CHANGE UP (ref 0–0.5)
EOSINOPHIL NFR BLD AUTO: 0.6 % — SIGNIFICANT CHANGE UP (ref 0–6)
ESTIMATED AVERAGE GLUCOSE: 123 MG/DL — HIGH (ref 68–114)
GLUCOSE SERPL-MCNC: 111 MG/DL — HIGH (ref 70–99)
HCT VFR BLD CALC: 33.9 % — LOW (ref 34.5–45)
HGB BLD-MCNC: 11.1 G/DL — LOW (ref 11.5–15.5)
IMM GRANULOCYTES NFR BLD AUTO: 1.5 % — HIGH (ref 0–0.9)
LYMPHOCYTES # BLD AUTO: 0.75 K/UL — LOW (ref 1–3.3)
LYMPHOCYTES # BLD AUTO: 9.4 % — LOW (ref 13–44)
MAGNESIUM SERPL-MCNC: 1.8 MG/DL — SIGNIFICANT CHANGE UP (ref 1.6–2.6)
MCHC RBC-ENTMCNC: 30.2 PG — SIGNIFICANT CHANGE UP (ref 27–34)
MCHC RBC-ENTMCNC: 32.7 GM/DL — SIGNIFICANT CHANGE UP (ref 32–36)
MCV RBC AUTO: 92.4 FL — SIGNIFICANT CHANGE UP (ref 80–100)
MONOCYTES # BLD AUTO: 1.54 K/UL — HIGH (ref 0–0.9)
MONOCYTES NFR BLD AUTO: 19.2 % — HIGH (ref 2–14)
NEUTROPHILS # BLD AUTO: 5.55 K/UL — SIGNIFICANT CHANGE UP (ref 1.8–7.4)
NEUTROPHILS NFR BLD AUTO: 69.2 % — SIGNIFICANT CHANGE UP (ref 43–77)
PLATELET # BLD AUTO: 141 K/UL — LOW (ref 150–400)
POTASSIUM SERPL-MCNC: 3.4 MMOL/L — LOW (ref 3.5–5.3)
POTASSIUM SERPL-SCNC: 3.4 MMOL/L — LOW (ref 3.5–5.3)
RBC # BLD: 3.67 M/UL — LOW (ref 3.8–5.2)
RBC # FLD: 15.2 % — HIGH (ref 10.3–14.5)
SODIUM SERPL-SCNC: 140 MMOL/L — SIGNIFICANT CHANGE UP (ref 135–145)
WBC # BLD: 8.02 K/UL — SIGNIFICANT CHANGE UP (ref 3.8–10.5)
WBC # FLD AUTO: 8.02 K/UL — SIGNIFICANT CHANGE UP (ref 3.8–10.5)

## 2023-04-04 PROCEDURE — 99232 SBSQ HOSP IP/OBS MODERATE 35: CPT

## 2023-04-04 RX ORDER — POTASSIUM CHLORIDE 20 MEQ
20 PACKET (EA) ORAL ONCE
Refills: 0 | Status: COMPLETED | OUTPATIENT
Start: 2023-04-04 | End: 2023-04-04

## 2023-04-04 RX ADMIN — ALBUTEROL 2 PUFF(S): 90 AEROSOL, METERED ORAL at 22:12

## 2023-04-04 RX ADMIN — RIVAROXABAN 10 MILLIGRAM(S): KIT at 11:10

## 2023-04-04 RX ADMIN — CEFEPIME 100 MILLIGRAM(S): 1 INJECTION, POWDER, FOR SOLUTION INTRAMUSCULAR; INTRAVENOUS at 05:06

## 2023-04-04 RX ADMIN — ALBUTEROL 2 PUFF(S): 90 AEROSOL, METERED ORAL at 14:16

## 2023-04-04 RX ADMIN — Medication 1 TABLET(S): at 11:10

## 2023-04-04 RX ADMIN — ALBUTEROL 2 PUFF(S): 90 AEROSOL, METERED ORAL at 04:06

## 2023-04-04 RX ADMIN — Medication 20 MILLIEQUIVALENT(S): at 14:16

## 2023-04-04 RX ADMIN — LOSARTAN POTASSIUM 100 MILLIGRAM(S): 100 TABLET, FILM COATED ORAL at 11:10

## 2023-04-04 RX ADMIN — SERTRALINE 50 MILLIGRAM(S): 25 TABLET, FILM COATED ORAL at 14:15

## 2023-04-04 RX ADMIN — ALBUTEROL 2 PUFF(S): 90 AEROSOL, METERED ORAL at 09:56

## 2023-04-04 RX ADMIN — CEFEPIME 100 MILLIGRAM(S): 1 INJECTION, POWDER, FOR SOLUTION INTRAMUSCULAR; INTRAVENOUS at 17:32

## 2023-04-04 RX ADMIN — TIOTROPIUM BROMIDE 2 PUFF(S): 18 CAPSULE ORAL; RESPIRATORY (INHALATION) at 09:56

## 2023-04-04 RX ADMIN — Medication 25 MILLIGRAM(S): at 11:11

## 2023-04-04 NOTE — PROGRESS NOTE ADULT - ASSESSMENT
Acute hypoxic respiratory distress (at rehab facility) secondary to aspiration pneumonia   - continuous pulse ox   - SpO2 90% on room air at [facility] -> 99% on 5L nasal cannula   - Titrate off O2 as tolerated   - Does not meet SIRS criteria   - s/p Aztreonam, Clindamycin, Vancomycin in ER; c/w Aztreonam (allergies to Penicillin and Azithromycin)   - BCx x 2 NGTD, F/u sputum culture  - CT chest: Interval bronchial impaction of the anterior basal right lower lobe bronchus with new small ill-defined nodule, may be sequela of recent choking episode/aspiration  - speech and swallow eval noted   - ID consult appreciated, continue cefepime 2g q12h     Acute metabolic encephalopathy likely secondary to infectious etiology vs. hospital acquired delirium vs. progression of dementia   - Improved   - CT head: no acute intracranial abnormality   - B12, folate, TSH normal   - F/u UA, UCx   - PT evaluation     Recent right hip fracture s/p closed reduction and percutaneous pinning of right hip on 3/29/23  - Xarelto 10 mg (for 33 more days given recent hip fracture -> will need to discuss if pt should  be continued on anticoagulation for A fib after 33 days or not, she is a fall risk and was off anticoagulation previously)   - Pt has sutures at site, will need removal in 1 week     Elevated lactic acid on admission   - Lactate 2.3 -> 1.5 after IVF     Normocytic anemia   - Hb 11.4 (baseline ~12), monitor closely     Thrombocytopenia   - , trend     Hyperglycemia   - Prediabetic range A1C     Elevated alkaline phosphatase and AST   - Improving     History of A fib, dementia (AAOx2), HTN, GERD, right hip fracture s/p closed reduction and percutaneous pinning of right hip on 3/29/23, severe MR, severe pulmonary HTN   - c/w home medications  - Monocytes 17% -> f/u with PCP and Heme/Onc outpt   - Other patchy bilateral groundglass opacities are unchanged since 3/29/2023 -> f/u outpt  - Cardiomegaly, 4+ TR -> f/u outpt   - Palliative care evaluation     Severe protein-calorie malnutrition  - nutrition consult appreciated          Acute hypoxic respiratory distress (at rehab facility) secondary to aspiration pneumonia   - continuous pulse ox   - SpO2 90% on room air at [facility] -> 99% on 5L nasal cannula   - Titrate off O2 as tolerated   - Does not meet SIRS criteria   - s/p Aztreonam, Clindamycin, Vancomycin in ER; c/w Aztreonam (allergies to Penicillin and Azithromycin)   - BCx x 2 NGTD, F/u sputum culture  - CT chest: Interval bronchial impaction of the anterior basal right lower lobe bronchus with new small ill-defined nodule, may be sequela of recent choking episode/aspiration  - speech and swallow eval noted   - ID consult appreciated, continue cefepime 2g q12h     Acute metabolic encephalopathy likely secondary to infectious etiology vs. hospital acquired delirium vs. progression of dementia   - Improved   - CT head: no acute intracranial abnormality   - B12, folate, TSH normal   - F/u UA, UCx   - PT evaluation     Recent right hip fracture s/p closed reduction and percutaneous pinning of right hip on 3/29/23  - Xarelto 10 mg (for 33 more days given recent hip fracture -> will need to discuss if pt should  be continued on anticoagulation for A fib after 33 days or not, she is a fall risk and was off anticoagulation previously)   - Pt has sutures at site, will need removal in 1 week     Elevated lactic acid on admission   - Lactate 2.3 -> 1.5 after IVF     Normocytic anemia   - Hb 11.4 (baseline ~12), monitor closely     Thrombocytopenia   - , trend     Hyperglycemia   - Prediabetic range A1C     Elevated alkaline phosphatase and AST   - Improving     History of chronic A fib, dementia (AAOx2), HTN, GERD, right hip fracture s/p closed reduction and percutaneous pinning of right hip on 3/29/23, severe MR, severe pulmonary HTN   - c/w home medications  - Monocytes 17% -> f/u with PCP and Heme/Onc outpt   - Other patchy bilateral groundglass opacities are unchanged since 3/29/2023 -> f/u outpt  - Cardiomegaly, 4+ TR -> f/u outpt   - Palliative care evaluation     Severe protein-calorie malnutrition  - nutrition consult appreciated

## 2023-04-04 NOTE — PROGRESS NOTE ADULT - ASSESSMENT
88 y/o F with PMH A fib, dementia (A+Ox1 to 2), HTN, GERD, right hip fracture s/p closed reduction and percutaneous pinning of right hip on 3/29/23, severe MR, severe pulmonary HTN, 4+ TR presented with cough, desaturation to 90% on room air, sent from [facility] to r/o aspiration. The pt is A+Ox0 at the time of my evaluation and unable to provide any additional history. per pts niece she was congested and coughing which was not the case prior to surgery. She has been increasingly confused and restless. Prior admission: 3/31/23: Right hip fracture transferred to [facility] ER course: VSS. Labs: Hb 11.4 (baseline ~12), , monocytes 17%, INR 1.44, lactate 2.3 -> 1.5, glucose 121, alkaline phosphatase 134, AST 47. COVID and RVP negative. Imaging: CT chest: Patulous, debris-filled esophagus. No retained radiopaque foreign body. Interval bronchial impaction of the anterior basal right lower lobe bronchus with new small ill-defined nodule, may be sequela of recent choking episode/aspiration. Other patchy bilateral groundglass opacities are unchanged since 3/29/2023. CT abd/pelvis: No bowel obstruction. Nondistended stomach. Pt was given Aztreonam, Clindamycin, Vancomycin, 1L of NS. She is being placed on med/surg observation with continuous pulse ox for further management.    1. Acute respiratory failure. Sepsis. Aspiration pneumonia/pneumonitis. PCN allergy.   - per pts niece, unknown pcn allergy  - on IV cefepime [id number] #2  - continue with antibiotic coverage   - monitor for rash/side effects  - f/u cultures - blood cx no growth  - speech/swallow eval noted   - aspiration precautions  - speech/swallow eval   - monitor temps  - tolerating abx well so far; no side effects noted  - reason for abx use and side effects reviewed with patient  - supportive care  - fu cbc    2. other issues - care per medicine

## 2023-04-04 NOTE — PHARMACOTHERAPY INTERVENTION NOTE - COMMENTS
Modified penicillin allergy to state patient tolerated cefepime during this admission.    Wilver Contreras, PharmD  Clinical Pharmacy Specialist, Infectious Diseases  Tele-Antimicrobial Stewardship Program (Tele-ASP)  Tele-ASP Phone: [phone]

## 2023-04-04 NOTE — PROGRESS NOTE ADULT - SUBJECTIVE AND OBJECTIVE BOX
HPI: 86 y/o F with PMH A fib, dementia (A+Ox1 to 2), HTN, GERD, right hip fracture s/p closed reduction and percutaneous pinning of right hip on 3/29/23, severe MR, severe pulmonary HTN, 4+ TR presented with cough, desaturation to 90% on room air, sent from [facility] to r/o aspiration. The pt is A+Ox0 at the time of my evaluation and unable to provide any additional history. I spoke to the pt's niece over the phone who stated that she visited the pt yesterday and she was congested and coughing which was not the case prior to surgery. She has been increasingly confused and restless.    Subjective: Patient seen this AM, feels well, sitting up in chair at bedside.       MEDICATIONS  (STANDING):  albuterol    90 MICROgram(s) HFA Inhaler 2 Puff(s) Inhalation every 6 hours  cefepime   IVPB      losartan 100 milliGRAM(s) Oral daily  metoprolol succinate ER 25 milliGRAM(s) Oral daily  multivitamin 1 Tablet(s) Oral daily  rivaroxaban 10 milliGRAM(s) Oral daily  sertraline 50 milliGRAM(s) Oral daily  sodium chloride 0.9%. 1000 milliLiter(s) (75 mL/Hr) IV Continuous <Continuous>  tiotropium 2.5 MICROgram(s) Inhaler 2 Puff(s) Inhalation daily    MEDICATIONS  (PRN):  acetaminophen     Tablet .. 650 milliGRAM(s) Oral every 6 hours PRN Temp greater or equal to 38C (100.4F), Mild Pain (1 - 3)  aluminum hydroxide/magnesium hydroxide/simethicone Suspension 30 milliLiter(s) Oral every 4 hours PRN Dyspepsia  melatonin 3 milliGRAM(s) Oral at bedtime PRN Insomnia  ondansetron Injectable 4 milliGRAM(s) IV Push every 8 hours PRN Nausea and/or Vomiting      Vital Signs Last 24 Hrs  T(C): 36.7 (04 Apr 2023 15:35), Max: 37 (04 Apr 2023 07:31)  T(F): 98.1 (04 Apr 2023 15:35), Max: 98.6 (04 Apr 2023 07:31)  HR: 68 (04 Apr 2023 15:35) (63 - 96)  BP: 144/84 (04 Apr 2023 15:35) (119/74 - 148/91)  RR: 19 (04 Apr 2023 15:35) (19 - 20)  SpO2: 99% (04 Apr 2023 15:35) (94% - 99%)    Parameters below as of 04 Apr 2023 15:35  Patient On (Oxygen Delivery Method): nasal cannula  O2 Flow (L/min): 2      PHYSICAL EXAM:  GENERAL: NAD, lying in bed comfortably  HEAD:  Atraumatic, Normocephalic  EYES: conjunctiva and sclera clear  ENT: Moist mucous membranes  NECK: Supple, No JVD  CHEST/LUNG: decreased breath sounds bilaterally. No rales, rhonchi, wheezing. Unlabored respirations  HEART: Regular rate and rhythm; No murmurs  ABDOMEN: Bowel sounds present; Soft, Nontender, Nondistended.   EXTREMITIES:  2+ Peripheral Pulses, brisk capillary refill. No clubbing, cyanosis, or edema  NERVOUS SYSTEM:  Alert & Oriented X2, speech clear.  MSK: FROM all 4 extremities        LABS:                                     11.1   8.02  )-----------( 141      ( 04 Apr 2023 06:56 )             33.9   04-04    140  |  111<H>  |  18  ----------------------------<  111<H>  3.4<L>   |  24  |  0.49<L>    Ca    9.1      04 Apr 2023 06:56  Mg     1.8     04-04    TPro  6.4  /  Alb  3.1<L>  /  TBili  1.2  /  DBili  x   /  AST  41<H>  /  ALT  53  /  AlkPhos  125<H>  04-03              RADIOLOGY:  < from: CT Head No Cont (04.02.23 @ 15:58) >    IMPRESSION: No acute intracranial abnormality.      < from: CT Abdomen and Pelvis No Cont (04.02.23 @ 16:06) >  BLADDER: Within normal limits.  REPRODUCTIVE ORGANS: Uterus and adnexa within normal limits.    BOWEL: No bowel obstruction. Stomach is nondistended. Colonic   diverticuli. Appendix is not visualized.  PERITONEUM: No ascites.  VESSELS: Within normal limits.  RETROPERITONEUM/LYMPH NODES: No lymphadenopathy.  ABDOMINAL WALL: Within normal limits.  BONES: Diffusely demineralized. Hemiarthroplasty of the left hip.   Fixation screws of the proximal right femur. Levoscoliosis of lumbar   spine with multilevel degenerative changes.    IMPRESSION:  No bowel obstruction. Nondistended stomach.    < from: CT Chest No Cont (04.02.23 @ 14:51) >    IMPRESSION:  Patulous, debris-filled esophagus. No retained radiopaque foreign body.    Interval bronchial impaction of the anterior basal rightlower lobe   bronchus with new small ill-defined nodule, may be sequela of recent   choking episode/aspiration. Other patchy bilateral groundglass opacities   are unchanged since 3/29/2023.             HPI: 86 y/o F with PMH chronic A fib, dementia (A+Ox1 to 2), HTN, GERD, right hip fracture s/p closed reduction and percutaneous pinning of right hip on 3/29/23, severe MR, severe pulmonary HTN, 4+ TR presented with cough, desaturation to 90% on room air, sent from [facility] to r/o aspiration. The pt is A+Ox0 at the time of my evaluation and unable to provide any additional history. I spoke to the pt's niece over the phone who stated that she visited the pt yesterday and she was congested and coughing which was not the case prior to surgery. She has been increasingly confused and restless.    Subjective: Patient seen this AM, feels well, sitting up in chair at bedside.       MEDICATIONS  (STANDING):  albuterol    90 MICROgram(s) HFA Inhaler 2 Puff(s) Inhalation every 6 hours  cefepime   IVPB      losartan 100 milliGRAM(s) Oral daily  metoprolol succinate ER 25 milliGRAM(s) Oral daily  multivitamin 1 Tablet(s) Oral daily  rivaroxaban 10 milliGRAM(s) Oral daily  sertraline 50 milliGRAM(s) Oral daily  sodium chloride 0.9%. 1000 milliLiter(s) (75 mL/Hr) IV Continuous <Continuous>  tiotropium 2.5 MICROgram(s) Inhaler 2 Puff(s) Inhalation daily    MEDICATIONS  (PRN):  acetaminophen     Tablet .. 650 milliGRAM(s) Oral every 6 hours PRN Temp greater or equal to 38C (100.4F), Mild Pain (1 - 3)  aluminum hydroxide/magnesium hydroxide/simethicone Suspension 30 milliLiter(s) Oral every 4 hours PRN Dyspepsia  melatonin 3 milliGRAM(s) Oral at bedtime PRN Insomnia  ondansetron Injectable 4 milliGRAM(s) IV Push every 8 hours PRN Nausea and/or Vomiting      Vital Signs Last 24 Hrs  T(C): 36.7 (04 Apr 2023 15:35), Max: 37 (04 Apr 2023 07:31)  T(F): 98.1 (04 Apr 2023 15:35), Max: 98.6 (04 Apr 2023 07:31)  HR: 68 (04 Apr 2023 15:35) (63 - 96)  BP: 144/84 (04 Apr 2023 15:35) (119/74 - 148/91)  RR: 19 (04 Apr 2023 15:35) (19 - 20)  SpO2: 99% (04 Apr 2023 15:35) (94% - 99%)    Parameters below as of 04 Apr 2023 15:35  Patient On (Oxygen Delivery Method): nasal cannula  O2 Flow (L/min): 2      PHYSICAL EXAM:  GENERAL: NAD, lying in bed comfortably  HEAD:  Atraumatic, Normocephalic  EYES: conjunctiva and sclera clear  ENT: Moist mucous membranes  NECK: Supple, No JVD  CHEST/LUNG: decreased breath sounds bilaterally. No rales, rhonchi, wheezing. Unlabored respirations  HEART: Regular rate and rhythm; No murmurs  ABDOMEN: Bowel sounds present; Soft, Nontender, Nondistended.   EXTREMITIES:  2+ Peripheral Pulses, brisk capillary refill. No clubbing, cyanosis, or edema  NERVOUS SYSTEM:  Alert & Oriented X2, speech clear.  MSK: FROM all 4 extremities        LABS:                                     11.1   8.02  )-----------( 141      ( 04 Apr 2023 06:56 )             33.9   04-04    140  |  111<H>  |  18  ----------------------------<  111<H>  3.4<L>   |  24  |  0.49<L>    Ca    9.1      04 Apr 2023 06:56  Mg     1.8     04-04    TPro  6.4  /  Alb  3.1<L>  /  TBili  1.2  /  DBili  x   /  AST  41<H>  /  ALT  53  /  AlkPhos  125<H>  04-03              RADIOLOGY:  < from: CT Head No Cont (04.02.23 @ 15:58) >    IMPRESSION: No acute intracranial abnormality.      < from: CT Abdomen and Pelvis No Cont (04.02.23 @ 16:06) >  BLADDER: Within normal limits.  REPRODUCTIVE ORGANS: Uterus and adnexa within normal limits.    BOWEL: No bowel obstruction. Stomach is nondistended. Colonic   diverticuli. Appendix is not visualized.  PERITONEUM: No ascites.  VESSELS: Within normal limits.  RETROPERITONEUM/LYMPH NODES: No lymphadenopathy.  ABDOMINAL WALL: Within normal limits.  BONES: Diffusely demineralized. Hemiarthroplasty of the left hip.   Fixation screws of the proximal right femur. Levoscoliosis of lumbar   spine with multilevel degenerative changes.    IMPRESSION:  No bowel obstruction. Nondistended stomach.    < from: CT Chest No Cont (04.02.23 @ 14:51) >    IMPRESSION:  Patulous, debris-filled esophagus. No retained radiopaque foreign body.    Interval bronchial impaction of the anterior basal rightlower lobe   bronchus with new small ill-defined nodule, may be sequela of recent   choking episode/aspiration. Other patchy bilateral groundglass opacities   are unchanged since 3/29/2023.

## 2023-04-04 NOTE — CDI QUERY NOTE - NSCDIOTHERTXTBX_GEN_ALL_CORE_HH
Clinical documentation indicates that this patient has atrial fibrillation.  Please further specify:    Chronic:  Paroxysmal:   Permanent:    Persistent:   Other:  Unknown:        SUPPORTING DOCUMENTATION AND/OR CLINICAL EVIDENCE:    HIE PMH on 3/31/2023 Chronic atrial fibrillation documented      EKG:< from: 12 Lead ECG (03.29.23 @ 13:28) >  Diagnosis Line ATRIAL FIBRILLATION WITH PREMATURE VENTRICULAR OR ABERRANTLY CONDUCTED COMPLEXES  LOW VOLTAGE QRS  SEPTAL INFARCT , AGE UNDETERMINED  artifact      Medications (Cardiovascular):  losartan 100 milliGRAM(s) Oral daily  metoprolol succinate ER 25 milliGRAM(s) Oral daily        Hospitalist progress note on 4/32023:  HPI: 86 y/o F with PMH A fib, dementia (A+Ox1 to 2), HTN, GERD, right hip fracture s/p closed reduction and percutaneous pinning of right hip on 3/29/23, severe MR, severe pulmonary HTN,    History of A fib, dementia (AAOx2), HTN, GERD, right hip fracture s/p closed reduction and percutaneous pinning of right hip on 3/29/23, severe MR, severe pulmonary HTN

## 2023-04-04 NOTE — PROGRESS NOTE ADULT - SUBJECTIVE AND OBJECTIVE BOX
Date of service: 04-04-23 @ 11:13    pt seen and examined  afebrile  laying in bed, nad  no resp distress    ROS: no fever or chills; denies dizziness, no HA, no abdominal pain, no diarrhea or constipation; no dysuria, no urinary frequency, no legs pain, no rashes    MEDICATIONS  (STANDING):  albuterol    90 MICROgram(s) HFA Inhaler 2 Puff(s) Inhalation every 6 hours  cefepime   IVPB      cefepime   IVPB 2000 milliGRAM(s) IV Intermittent every 12 hours  losartan 100 milliGRAM(s) Oral daily  metoprolol succinate ER 25 milliGRAM(s) Oral daily  multivitamin 1 Tablet(s) Oral daily  rivaroxaban 10 milliGRAM(s) Oral daily  sertraline 50 milliGRAM(s) Oral daily  sodium chloride 0.9%. 1000 milliLiter(s) (75 mL/Hr) IV Continuous <Continuous>  tiotropium 2.5 MICROgram(s) Inhaler 2 Puff(s) Inhalation daily      Vital Signs Last 24 Hrs  T(C): 37 (04 Apr 2023 07:31), Max: 37 (04 Apr 2023 07:31)  T(F): 98.6 (04 Apr 2023 07:31), Max: 98.6 (04 Apr 2023 07:31)  HR: 63 (04 Apr 2023 11:09) (63 - 96)  BP: 119/74 (04 Apr 2023 11:09) (119/74 - 154/96)  BP(mean): --  RR: 19 (04 Apr 2023 11:09) (18 - 20)  SpO2: 97% (04 Apr 2023 11:09) (94% - 99%)    Parameters below as of 04 Apr 2023 11:09  Patient On (Oxygen Delivery Method): nasal cannula  O2 Flow (L/min): 2          PE:  Constitutional: frail looking  HEENT: NC/AT, EOMI, PERRLA, conjunctivae clear; ears and nose atraumatic; pharynx benign  Neck: supple; thyroid not palpable  Back: no tenderness  Respiratory: decreased breath sounds   Cardiovascular: S1S2 regular, no murmurs  Abdomen: soft, not tender, not distended, positive BS; liver and spleen WNL  Genitourinary: no suprapubic tenderness  Lymphatic: no LN palpable  Musculoskeletal: no muscle tenderness, no joint swelling or tenderness  Extremities: no pedal edema  Neurological/ Psychiatric: no focal deficits   Skin: no rashes; no palpable lesions    Labs: all available labs reviewed                                   11.1   8.02  )-----------( 141      ( 04 Apr 2023 06:56 )             33.9     04-04    140  |  111<H>  |  18  ----------------------------<  111<H>  3.4<L>   |  24  |  0.49<L>    Ca    9.1      04 Apr 2023 06:56  Mg     1.9     04-03    TPro  6.4  /  Alb  3.1<L>  /  TBili  1.2  /  DBili  x   /  AST  41<H>  /  ALT  53  /  AlkPhos  125<H>  04-03           Cultures:   Culture - Blood (04.02.23 @ 16:56)   Specimen Source: .Blood None  Culture Results:   No growth to date.  Culture - Blood (04.02.23 @ 16:23)   Specimen Source: .Blood Blood  Culture Results:   No growth to date.            Radiology: all available radiological tests reviewed  < from: CT Abdomen and Pelvis No Cont (04.02.23 @ 16:06) >    ACC: [id number] EXAM:  CT ABDOMEN AND PELVIS   ORDERED BY: SOLA JULIAN     PROCEDURE DATE:  04/02/2023          INTERPRETATION:  CLINICAL INFORMATION: Possible aspiration. Dilated   esophagus on chest.    COMPARISON: CT chest of earlier the same date.    CONTRAST/COMPLICATIONS:  IV Contrast: NONE  Oral Contrast: NONE  Complications: None reported at time of study completion    PROCEDURE:  CT of the Abdomen and Pelvis was performed.  Sagittal and coronal reformats were performed.    FINDINGS:  LOWER CHEST: Cardiomegaly with massive biatrial enlargement, right   greater than left. Coronary artery calcifications. Trace bilateral   pleural effusions, left greater than right.    LIVER: Within normal limits.  BILE DUCTS: Normal caliber.  GALLBLADDER: Not visualized.  SPLEEN: Within normal limits.  PANCREAS: Within normal limits.  ADRENALS: Within normal limits.  KIDNEYS/URETERS: Within normal limits.    BLADDER: Within normal limits.  REPRODUCTIVE ORGANS: Uterus and adnexa within normal limits.    BOWEL: No bowel obstruction. Stomach is nondistended. Colonic   diverticuli. Appendix is not visualized.  PERITONEUM: No ascites.  VESSELS: Within normal limits.  RETROPERITONEUM/LYMPH NODES: No lymphadenopathy.  ABDOMINAL WALL: Within normal limits.  BONES: Diffusely demineralized. Hemiarthroplasty of the left hip.   Fixation screws of the proximal right femur. Levoscoliosis of lumbar   spine with multilevel degenerative changes.    IMPRESSION:  No bowel obstruction. Nondistended stomach.      < end of copied text >  < from: CT Head No Cont (04.02.23 @ 15:58) >  ACC: [id number] EXAM:  CT BRAIN   ORDERED BY: SOLA JULIAN     PROCEDURE DATE:  04/02/2023          INTERPRETATION:  PROCEDURE: CT brain without intravenous contrast    INDICATION: Possible aspiration    TECHNIQUE: Multiple axial images were obtained at 5 mm intervals from the   skull base to the vertex. Sagittal and coronal reformatted images were   obtained from the axial data set. The images were reviewed in brain and   bone windows.    COMPARISON: 3/27/2023    FINDINGS: The CT examination demonstrates the ventricles, cisternal   spaces, and cortical sulci to be within normal limits. There is no   midline shift or extra axial collections. The gray white differentiation   appears within normal limits. There is no intracranial hemorrhage or   acute transcortical infarct. The bony windows demonstrates no fractures.   The visualized paranasal sinuses are within normal limits. The mastoid   air cells are well aerated.    IMPRESSION: No acute intracranial abnormality.    --- End of Report ---        < end of copied text >  < from: CT Chest No Cont (04.02.23 @ 14:51) >    ACC: [id number] EXAM:  CT CHEST   ORDERED BY: NUBIA HUERTA     PROCEDURE DATE:  04/02/2023          INTERPRETATION:  INDICATION: Status post choking    TECHNIQUE: A volumetric CT acquisition of the chest was obtained from the   thoracic inlet to the upper abdomen without the use of intravenous   contrast. Coronal and sagittal reconstructed images are provided.    COMPARISON: Chest CT 3/29/2023    FINDINGS:    Stable small left greater than right pleural effusions. Few patchy   groundglass opacities are unchanged since 3/29/2023. Interval impaction   of the anterior basal right lower lobe bronchus with new small   ill-defined nodule on series 2, image 80.    Mediastinum/Lymph nodes: No thoracic adenopathy. Patulous, debris-filled   esophagus. No retained radiopaque foreign body.    Heart and Vessels: Cardiomegaly with massive biatrial enlargement.   Coronary arterial, aortic valvular and mitral annular calcification. No   pericardial effusion.    Upper Abdomen: Left renal parapelvic cyst.    Osseous structures and Soft Tissues: No aggressive bone lesions.   Exaggerated thoracic kyphosis.    IMPRESSION:  Patulous, debris-filled esophagus. No retained radiopaque foreign body.    Interval bronchial impaction of the anterior basal rightlower lobe   bronchus with new small ill-defined nodule, may be sequela of recent   choking episode/aspiration. Other patchy bilateral groundglass opacities   are unchanged since 3/29/2023.    --- End of Report ---    < end of copied text >    Advanced directives addressed: full resuscitation

## 2023-04-05 LAB
ADD ON TEST-SPECIMEN IN LAB: SIGNIFICANT CHANGE UP
ANION GAP SERPL CALC-SCNC: 4 MMOL/L — LOW (ref 5–17)
BASOPHILS # BLD AUTO: 0.02 K/UL — SIGNIFICANT CHANGE UP (ref 0–0.2)
BASOPHILS NFR BLD AUTO: 0.2 % — SIGNIFICANT CHANGE UP (ref 0–2)
BUN SERPL-MCNC: 19 MG/DL — SIGNIFICANT CHANGE UP (ref 7–23)
CALCIUM SERPL-MCNC: 9 MG/DL — SIGNIFICANT CHANGE UP (ref 8.5–10.1)
CHLORIDE SERPL-SCNC: 109 MMOL/L — HIGH (ref 96–108)
CO2 SERPL-SCNC: 25 MMOL/L — SIGNIFICANT CHANGE UP (ref 22–31)
CREAT SERPL-MCNC: 0.44 MG/DL — LOW (ref 0.5–1.3)
EGFR: 95 ML/MIN/1.73M2 — SIGNIFICANT CHANGE UP
EOSINOPHIL # BLD AUTO: 0.09 K/UL — SIGNIFICANT CHANGE UP (ref 0–0.5)
EOSINOPHIL NFR BLD AUTO: 1.1 % — SIGNIFICANT CHANGE UP (ref 0–6)
GLUCOSE SERPL-MCNC: 118 MG/DL — HIGH (ref 70–99)
HCT VFR BLD CALC: 32.3 % — LOW (ref 34.5–45)
HGB BLD-MCNC: 10.6 G/DL — LOW (ref 11.5–15.5)
IMM GRANULOCYTES NFR BLD AUTO: 1.4 % — HIGH (ref 0–0.9)
LYMPHOCYTES # BLD AUTO: 0.89 K/UL — LOW (ref 1–3.3)
LYMPHOCYTES # BLD AUTO: 11 % — LOW (ref 13–44)
MAGNESIUM SERPL-MCNC: 1.8 MG/DL — SIGNIFICANT CHANGE UP (ref 1.6–2.6)
MCHC RBC-ENTMCNC: 30.5 PG — SIGNIFICANT CHANGE UP (ref 27–34)
MCHC RBC-ENTMCNC: 32.8 GM/DL — SIGNIFICANT CHANGE UP (ref 32–36)
MCV RBC AUTO: 92.8 FL — SIGNIFICANT CHANGE UP (ref 80–100)
MONOCYTES # BLD AUTO: 1.66 K/UL — HIGH (ref 0–0.9)
MONOCYTES NFR BLD AUTO: 20.6 % — HIGH (ref 2–14)
NEUTROPHILS # BLD AUTO: 5.3 K/UL — SIGNIFICANT CHANGE UP (ref 1.8–7.4)
NEUTROPHILS NFR BLD AUTO: 65.7 % — SIGNIFICANT CHANGE UP (ref 43–77)
PHOSPHATE SERPL-MCNC: 2.5 MG/DL — SIGNIFICANT CHANGE UP (ref 2.5–4.5)
PLATELET # BLD AUTO: 142 K/UL — LOW (ref 150–400)
POTASSIUM SERPL-MCNC: 3.2 MMOL/L — LOW (ref 3.5–5.3)
POTASSIUM SERPL-SCNC: 3.2 MMOL/L — LOW (ref 3.5–5.3)
RBC # BLD: 3.48 M/UL — LOW (ref 3.8–5.2)
RBC # FLD: 15.3 % — HIGH (ref 10.3–14.5)
SODIUM SERPL-SCNC: 138 MMOL/L — SIGNIFICANT CHANGE UP (ref 135–145)
WBC # BLD: 8.07 K/UL — SIGNIFICANT CHANGE UP (ref 3.8–10.5)
WBC # FLD AUTO: 8.07 K/UL — SIGNIFICANT CHANGE UP (ref 3.8–10.5)

## 2023-04-05 PROCEDURE — 99233 SBSQ HOSP IP/OBS HIGH 50: CPT

## 2023-04-05 PROCEDURE — 99232 SBSQ HOSP IP/OBS MODERATE 35: CPT

## 2023-04-05 RX ORDER — POTASSIUM CHLORIDE 20 MEQ
10 PACKET (EA) ORAL
Refills: 0 | Status: COMPLETED | OUTPATIENT
Start: 2023-04-05 | End: 2023-04-05

## 2023-04-05 RX ADMIN — Medication 25 MILLIGRAM(S): at 09:33

## 2023-04-05 RX ADMIN — TIOTROPIUM BROMIDE 2 PUFF(S): 18 CAPSULE ORAL; RESPIRATORY (INHALATION) at 10:27

## 2023-04-05 RX ADMIN — Medication 100 MILLIEQUIVALENT(S): at 13:22

## 2023-04-05 RX ADMIN — RIVAROXABAN 10 MILLIGRAM(S): KIT at 09:33

## 2023-04-05 RX ADMIN — Medication 200 MILLIGRAM(S): at 11:49

## 2023-04-05 RX ADMIN — ALBUTEROL 2 PUFF(S): 90 AEROSOL, METERED ORAL at 10:26

## 2023-04-05 RX ADMIN — ALBUTEROL 2 PUFF(S): 90 AEROSOL, METERED ORAL at 21:03

## 2023-04-05 RX ADMIN — Medication 100 MILLIEQUIVALENT(S): at 10:51

## 2023-04-05 RX ADMIN — Medication 200 MILLIGRAM(S): at 23:34

## 2023-04-05 RX ADMIN — ALBUTEROL 2 PUFF(S): 90 AEROSOL, METERED ORAL at 14:10

## 2023-04-05 RX ADMIN — Medication 200 MILLIGRAM(S): at 17:00

## 2023-04-05 RX ADMIN — Medication 1 TABLET(S): at 09:33

## 2023-04-05 RX ADMIN — CEFEPIME 100 MILLIGRAM(S): 1 INJECTION, POWDER, FOR SOLUTION INTRAMUSCULAR; INTRAVENOUS at 17:00

## 2023-04-05 RX ADMIN — SERTRALINE 50 MILLIGRAM(S): 25 TABLET, FILM COATED ORAL at 09:33

## 2023-04-05 RX ADMIN — CEFEPIME 100 MILLIGRAM(S): 1 INJECTION, POWDER, FOR SOLUTION INTRAMUSCULAR; INTRAVENOUS at 06:00

## 2023-04-05 RX ADMIN — LOSARTAN POTASSIUM 100 MILLIGRAM(S): 100 TABLET, FILM COATED ORAL at 09:33

## 2023-04-05 NOTE — PROGRESS NOTE ADULT - ASSESSMENT
Process of Care  --Reviewed dx/treatment problems and alignment with Goals of Care    Physical Aspects of Care  --Pain  c/w current management    --Bowel Regimen  risk for constipation d/t immobility  daily dulcolax    --Dyspnea  No SOB at this time  comfortable and in NAD    --Nausea Vomiting  denies    --Weakness  PT as tolerated     Psychological and Psychiatric Aspects of Care:   Grief Bereavement emotional support provided  --Hx of psychiatric dx: none  -Pastoral Care Available PRN     Social Aspects of Care  -SW involved     Cultural Aspects  -Primary Language: English    Goals of Care:     case d/w family   pt remains full code  mental status has improved   would still continue to recommend and discuss a dnr dni d/t underlying            Ethical and Legal Aspects:   NA     Capacity: no capacity  HCP/ Surrogate:   Code Status: full code  MOLST:  Dispo Plan: pending further discussion    Discussed With: Case coordinated with attending and SW and RN     Time Spent: 50 minutes including the care, coordination and counseling of this patient, 50% of which was spent coordinating and counseling.

## 2023-04-05 NOTE — PROGRESS NOTE ADULT - ASSESSMENT
Acute hypoxic respiratory distress (at rehab facility) secondary to aspiration pneumonia   - continuous pulse ox   - SpO2 90% on room air at [facility] -> 99% on 5L nasal cannula   - Titrate off O2 as tolerated   - Does not meet SIRS criteria   - s/p Aztreonam, Clindamycin, Vancomycin in ER; c/w Aztreonam (allergies to Penicillin and Azithromycin)   - BCx x 2 NGTD, F/u sputum culture  - CT chest: Interval bronchial impaction of the anterior basal right lower lobe bronchus with new small ill-defined nodule, may be sequela of recent choking episode/aspiration  - speech and swallow eval noted   - ID consult appreciated, continue cefepime 2g q12h --> day #3  - D/c on augmentin for 7-10 day course      Acute metabolic encephalopathy likely secondary to infectious etiology vs. hospital acquired delirium vs. progression of dementia   - Improved   - CT head: no acute intracranial abnormality   - B12, folate, TSH normal   - PT evaluation     Recent right hip fracture s/p closed reduction and percutaneous pinning of right hip on 3/29/23  - Xarelto 10 mg (for 33 more days given recent hip fracture -> will need to discuss if pt should  be continued on anticoagulation for A fib after 33 days or not, she is a fall risk and was off anticoagulation previously)   - Pt has sutures at site, will need removal in 1 week     Elevated lactic acid on admission   - Lactate 2.3 -> 1.5 after IVF     Normocytic anemia   - Hb 11.4 (baseline ~12), monitor closely     Thrombocytopenia   - , trend     Hyperglycemia   - Prediabetic range A1C     Elevated alkaline phosphatase and AST   - Improving     History of chronic A fib, dementia (AAOx2), HTN, GERD, right hip fracture s/p closed reduction and percutaneous pinning of right hip on 3/29/23, severe MR, severe pulmonary HTN   - c/w home medications  - Monocytes 17% -> f/u with PCP and Heme/Onc outpt   - Other patchy bilateral groundglass opacities are unchanged since 3/29/2023 -> f/u outpt  - Cardiomegaly, 4+ TR -> f/u outpt   - Palliative care evaluation     Severe protein-calorie malnutrition  - nutrition consult appreciated     DISPO: d/c 1-2 days

## 2023-04-05 NOTE — PROGRESS NOTE ADULT - SUBJECTIVE AND OBJECTIVE BOX
Date of service: 04-05-23 @ 09:52    pt seen and examined  afebrile  laying in bed, nad  feels better  no fever    ROS: no fever or chills; denies dizziness, no HA, no abdominal pain, no diarrhea or constipation; no dysuria, no urinary frequency, no legs pain, no rashes      MEDICATIONS  (STANDING):  albuterol    90 MICROgram(s) HFA Inhaler 2 Puff(s) Inhalation every 6 hours  cefepime   IVPB      cefepime   IVPB 2000 milliGRAM(s) IV Intermittent every 12 hours  losartan 100 milliGRAM(s) Oral daily  metoprolol succinate ER 25 milliGRAM(s) Oral daily  multivitamin 1 Tablet(s) Oral daily  potassium chloride  10 mEq/100 mL IVPB 10 milliEquivalent(s) IV Intermittent every 1 hour  rivaroxaban 10 milliGRAM(s) Oral daily  sertraline 50 milliGRAM(s) Oral daily  sodium chloride 0.9%. 1000 milliLiter(s) (75 mL/Hr) IV Continuous <Continuous>  tiotropium 2.5 MICROgram(s) Inhaler 2 Puff(s) Inhalation daily    Vital Signs Last 24 Hrs  T(C): 37 (05 Apr 2023 07:32), Max: 37 (05 Apr 2023 07:32)  T(F): 98.6 (05 Apr 2023 07:32), Max: 98.6 (05 Apr 2023 07:32)  HR: 72 (05 Apr 2023 09:32) (63 - 96)  BP: 151/82 (05 Apr 2023 09:32) (119/74 - 151/82)  BP(mean): --  RR: 18 (05 Apr 2023 07:32) (18 - 19)  SpO2: 100% (05 Apr 2023 07:32) (95% - 100%)    Parameters below as of 05 Apr 2023 07:32  Patient On (Oxygen Delivery Method): nasal cannula  O2 Flow (L/min): 0.2      PE:  Constitutional: frail looking  HEENT: NC/AT, EOMI, PERRLA, conjunctivae clear; ears and nose atraumatic; pharynx benign  Neck: supple; thyroid not palpable  Back: no tenderness  Respiratory: decreased breath sounds   Cardiovascular: S1S2 regular, no murmurs  Abdomen: soft, not tender, not distended, positive BS; liver and spleen WNL  Genitourinary: no suprapubic tenderness  Lymphatic: no LN palpable  Musculoskeletal: no muscle tenderness, no joint swelling or tenderness  Extremities: no pedal edema  Neurological/ Psychiatric: no focal deficits   Skin: no rashes; no palpable lesions    Labs: all available labs reviewed                                   10.6   8.07  )-----------( 142      ( 05 Apr 2023 06:56 )             32.3     04-05    138  |  109<H>  |  19  ----------------------------<  118<H>  3.2<L>   |  25  |  0.44<L>    Ca    9.0      05 Apr 2023 06:56  Phos  2.5     04-05  Mg     1.8     04-05           Cultures:   Culture - Blood (04.02.23 @ 16:56)   Specimen Source: .Blood None  Culture Results:   No growth to date.  Culture - Blood (04.02.23 @ 16:23)   Specimen Source: .Blood Blood  Culture Results:   No growth to date.            Radiology: all available radiological tests reviewed  < from: CT Abdomen and Pelvis No Cont (04.02.23 @ 16:06) >    ACC: [id number] EXAM:  CT ABDOMEN AND PELVIS   ORDERED BY: SOLA JULIAN     PROCEDURE DATE:  04/02/2023          INTERPRETATION:  CLINICAL INFORMATION: Possible aspiration. Dilated   esophagus on chest.    COMPARISON: CT chest of earlier the same date.    CONTRAST/COMPLICATIONS:  IV Contrast: NONE  Oral Contrast: NONE  Complications: None reported at time of study completion    PROCEDURE:  CT of the Abdomen and Pelvis was performed.  Sagittal and coronal reformats were performed.    FINDINGS:  LOWER CHEST: Cardiomegaly with massive biatrial enlargement, right   greater than left. Coronary artery calcifications. Trace bilateral   pleural effusions, left greater than right.    LIVER: Within normal limits.  BILE DUCTS: Normal caliber.  GALLBLADDER: Not visualized.  SPLEEN: Within normal limits.  PANCREAS: Within normal limits.  ADRENALS: Within normal limits.  KIDNEYS/URETERS: Within normal limits.    BLADDER: Within normal limits.  REPRODUCTIVE ORGANS: Uterus and adnexa within normal limits.    BOWEL: No bowel obstruction. Stomach is nondistended. Colonic   diverticuli. Appendix is not visualized.  PERITONEUM: No ascites.  VESSELS: Within normal limits.  RETROPERITONEUM/LYMPH NODES: No lymphadenopathy.  ABDOMINAL WALL: Within normal limits.  BONES: Diffusely demineralized. Hemiarthroplasty of the left hip.   Fixation screws of the proximal right femur. Levoscoliosis of lumbar   spine with multilevel degenerative changes.    IMPRESSION:  No bowel obstruction. Nondistended stomach.      < end of copied text >  < from: CT Head No Cont (04.02.23 @ 15:58) >  ACC: [id number] EXAM:  CT BRAIN   ORDERED BY: SOLA JULIAN     PROCEDURE DATE:  04/02/2023          INTERPRETATION:  PROCEDURE: CT brain without intravenous contrast    INDICATION: Possible aspiration    TECHNIQUE: Multiple axial images were obtained at 5 mm intervals from the   skull base to the vertex. Sagittal and coronal reformatted images were   obtained from the axial data set. The images were reviewed in brain and   bone windows.    COMPARISON: 3/27/2023    FINDINGS: The CT examination demonstrates the ventricles, cisternal   spaces, and cortical sulci to be within normal limits. There is no   midline shift or extra axial collections. The gray white differentiation   appears within normal limits. There is no intracranial hemorrhage or   acute transcortical infarct. The bony windows demonstrates no fractures.   The visualized paranasal sinuses are within normal limits. The mastoid   air cells are well aerated.    IMPRESSION: No acute intracranial abnormality.    --- End of Report ---        < end of copied text >  < from: CT Chest No Cont (04.02.23 @ 14:51) >    ACC: [id number] EXAM:  CT CHEST   ORDERED BY: NUBIA HUERTA     PROCEDURE DATE:  04/02/2023          INTERPRETATION:  INDICATION: Status post choking    TECHNIQUE: A volumetric CT acquisition of the chest was obtained from the   thoracic inlet to the upper abdomen without the use of intravenous   contrast. Coronal and sagittal reconstructed images are provided.    COMPARISON: Chest CT 3/29/2023    FINDINGS:    Stable small left greater than right pleural effusions. Few patchy   groundglass opacities are unchanged since 3/29/2023. Interval impaction   of the anterior basal right lower lobe bronchus with new small   ill-defined nodule on series 2, image 80.    Mediastinum/Lymph nodes: No thoracic adenopathy. Patulous, debris-filled   esophagus. No retained radiopaque foreign body.    Heart and Vessels: Cardiomegaly with massive biatrial enlargement.   Coronary arterial, aortic valvular and mitral annular calcification. No   pericardial effusion.    Upper Abdomen: Left renal parapelvic cyst.    Osseous structures and Soft Tissues: No aggressive bone lesions.   Exaggerated thoracic kyphosis.    IMPRESSION:  Patulous, debris-filled esophagus. No retained radiopaque foreign body.    Interval bronchial impaction of the anterior basal rightlower lobe   bronchus with new small ill-defined nodule, may be sequela of recent   choking episode/aspiration. Other patchy bilateral groundglass opacities   are unchanged since 3/29/2023.    --- End of Report ---    < end of copied text >    Advanced directives addressed: full resuscitation

## 2023-04-05 NOTE — PROGRESS NOTE ADULT - ASSESSMENT
88 y/o F with PMH A fib, dementia (A+Ox1 to 2), HTN, GERD, right hip fracture s/p closed reduction and percutaneous pinning of right hip on 3/29/23, severe MR, severe pulmonary HTN, 4+ TR presented with cough, desaturation to 90% on room air, sent from [facility] to r/o aspiration. The pt is A+Ox0 at the time of my evaluation and unable to provide any additional history. per pts niece she was congested and coughing which was not the case prior to surgery. She has been increasingly confused and restless. Prior admission: 3/31/23: Right hip fracture transferred to [facility] ER course: VSS. Labs: Hb 11.4 (baseline ~12), , monocytes 17%, INR 1.44, lactate 2.3 -> 1.5, glucose 121, alkaline phosphatase 134, AST 47. COVID and RVP negative. Imaging: CT chest: Patulous, debris-filled esophagus. No retained radiopaque foreign body. Interval bronchial impaction of the anterior basal right lower lobe bronchus with new small ill-defined nodule, may be sequela of recent choking episode/aspiration. Other patchy bilateral groundglass opacities are unchanged since 3/29/2023. CT abd/pelvis: No bowel obstruction. Nondistended stomach. Pt was given Aztreonam, Clindamycin, Vancomycin, 1L of NS. She is being placed on med/surg observation with continuous pulse ox for further management.    1. Acute respiratory failure. Sepsis. Aspiration pneumonia/pneumonitis. PCN allergy.   - per pts niece, unknown pcn allergy  - on IV cefepime [id number] #[id number]  - continue with antibiotic coverage   - monitor for rash/side effects  - f/u cultures - blood cx no growth  - speech/swallow eval noted   - aspiration precautions  - speech/swallow eval noted   - monitor temps  - tolerating abx well so far; no side effects noted  - reason for abx use and side effects reviewed with patient  - on dc augmentin oral solution bid complete 7-10 day course  - supportive care  - fu cbc    2. other issues - care per medicine

## 2023-04-05 NOTE — PROGRESS NOTE ADULT - SUBJECTIVE AND OBJECTIVE BOX
HPI:    CODE STATUS:  FULL CODE      "86 y/o F with PMH A fib, dementia (A+Ox1 to 2), HTN, GERD, right hip fracture s/p closed reduction and percutaneous pinning of right hip on 3/29/23, severe MR, severe pulmonary HTN, 4+ TR presented with cough, desaturation to 90% on room air, sent from [facility] to r/o aspiration. The pt is A+Ox0 at the time of my evaluation and unable to provide any additional history."     4/3/23  pt seen and examind  very confused   w/o capacity at [facility] time  denied pain or sob  no nausea or vomiting  FULL  CODE as family not ready to put any limits at this time        4/5/23    PT SEEN and examind  Patient was seen and examined.  Denies nausea, vomiting, shortness of breath, chest pain, headaches, abdominal pain, constipation   Shes very hard of hearing and appears delirious as well.         Pain and Dyspnea:   none    Review of Systems:    limited to above  Vey hard of hearing  +delirium      PHYSICAL EXAM:    Vital Signs Last 24 Hrs  T(C): 37 (05 Apr 2023 07:32), Max: 37 (05 Apr 2023 07:32)  T(F): 98.6 (05 Apr 2023 07:32), Max: 98.6 (05 Apr 2023 07:32)  HR: 72 (05 Apr 2023 09:32) (63 - 96)  BP: 151/82 (05 Apr 2023 09:32) (119/74 - 151/82)  BP(mean): --  RR: 18 (05 Apr 2023 07:32) (18 - 19)  SpO2: 100% (05 Apr 2023 07:32) (95% - 100%)    Parameters below as of 05 Apr 2023 07:32  Patient On (Oxygen Delivery Method): nasal cannula  O2 Flow (L/min): 0.2    Daily     Daily     PPSV2:  30 %  FAST:    General: calm in NAD  Mental Status: awake alert oriented x1  HEENT: eomi, perrl  Lungs: ctabl b/l bs  Cardiac: s1s2 no mgr  GI: soft nontender +BS  : voids  Ext: moves all 4 extremities spontaneously  Neuro: confused +delirium      LABS and RADIOLOGY: REVIEWED

## 2023-04-05 NOTE — PROGRESS NOTE ADULT - SUBJECTIVE AND OBJECTIVE BOX
HPI: 86 y/o F with PMH chronic A fib, dementia (A+Ox1 to 2), HTN, GERD, right hip fracture s/p closed reduction and percutaneous pinning of right hip on 3/29/23, severe MR, severe pulmonary HTN, 4+ TR presented with cough, desaturation to 90% on room air, sent from [facility] to r/o aspiration. The pt is A+Ox0 at the time of my evaluation and unable to provide any additional history. I spoke to the pt's niece over the phone who stated that she visited the pt yesterday and she was congested and coughing which was not the case prior to surgery. She has been increasingly confused and restless.    Subjective: Patient seen this AM, confused, stating that we are holding her hostage. No overnight issues reported.       MEDICATIONS  (STANDING):  albuterol    90 MICROgram(s) HFA Inhaler 2 Puff(s) Inhalation every 6 hours  cefepime   IVPB      losartan 100 milliGRAM(s) Oral daily  metoprolol succinate ER 25 milliGRAM(s) Oral daily  multivitamin 1 Tablet(s) Oral daily  rivaroxaban 10 milliGRAM(s) Oral daily  sertraline 50 milliGRAM(s) Oral daily  sodium chloride 0.9%. 1000 milliLiter(s) (75 mL/Hr) IV Continuous <Continuous>  tiotropium 2.5 MICROgram(s) Inhaler 2 Puff(s) Inhalation daily    MEDICATIONS  (PRN):  acetaminophen     Tablet .. 650 milliGRAM(s) Oral every 6 hours PRN Temp greater or equal to 38C (100.4F), Mild Pain (1 - 3)  aluminum hydroxide/magnesium hydroxide/simethicone Suspension 30 milliLiter(s) Oral every 4 hours PRN Dyspepsia  melatonin 3 milliGRAM(s) Oral at bedtime PRN Insomnia  ondansetron Injectable 4 milliGRAM(s) IV Push every 8 hours PRN Nausea and/or Vomiting      Vital Signs Last 24 Hrs  T(C): 36.7 (05 Apr 2023 15:30), Max: 37 (05 Apr 2023 07:32)  T(F): 98.1 (05 Apr 2023 15:30), Max: 98.6 (05 Apr 2023 07:32)  HR: 65 (05 Apr 2023 15:30) (65 - 78)  BP: 149/76 (05 Apr 2023 15:30) (145/93 - 151/82)  RR: 18 (05 Apr 2023 15:30) (18 - 19)  SpO2: 96% (05 Apr 2023 15:30) (95% - 100%)    Parameters below as of 05 Apr 2023 15:30  Patient On (Oxygen Delivery Method): nasal cannula  O2 Flow (L/min): 2        PHYSICAL EXAM:  GENERAL: NAD, lying in bed comfortably  HEAD:  Atraumatic, Normocephalic  EYES: conjunctiva and sclera clear  ENT: Moist mucous membranes  NECK: Supple, No JVD  CHEST/LUNG: decreased breath sounds bilaterally. No rales, rhonchi, wheezing. Unlabored respirations  HEART: Regular rate and rhythm; No murmurs  ABDOMEN: Bowel sounds present; Soft, Nontender, Nondistended.   EXTREMITIES:  2+ Peripheral Pulses, brisk capillary refill. No clubbing, cyanosis, or edema  NERVOUS SYSTEM:  Alert & Oriented X1, speech clear.  MSK: FROM all 4 extremities        LABS:                                                10.6   8.07  )-----------( 142      ( 05 Apr 2023 06:56 )             32.3   04-05    138  |  109<H>  |  19  ----------------------------<  118<H>  3.2<L>   |  25  |  0.44<L>    Ca    9.0      05 Apr 2023 06:56  Phos  2.5     04-05  Mg     1.8     04-05                  RADIOLOGY:  < from: CT Head No Cont (04.02.23 @ 15:58) >    IMPRESSION: No acute intracranial abnormality.      < from: CT Abdomen and Pelvis No Cont (04.02.23 @ 16:06) >  BLADDER: Within normal limits.  REPRODUCTIVE ORGANS: Uterus and adnexa within normal limits.    BOWEL: No bowel obstruction. Stomach is nondistended. Colonic   diverticuli. Appendix is not visualized.  PERITONEUM: No ascites.  VESSELS: Within normal limits.  RETROPERITONEUM/LYMPH NODES: No lymphadenopathy.  ABDOMINAL WALL: Within normal limits.  BONES: Diffusely demineralized. Hemiarthroplasty of the left hip.   Fixation screws of the proximal right femur. Levoscoliosis of lumbar   spine with multilevel degenerative changes.    IMPRESSION:  No bowel obstruction. Nondistended stomach.    < from: CT Chest No Cont (04.02.23 @ 14:51) >    IMPRESSION:  Patulous, debris-filled esophagus. No retained radiopaque foreign body.    Interval bronchial impaction of the anterior basal rightlower lobe   bronchus with new small ill-defined nodule, may be sequela of recent   choking episode/aspiration. Other patchy bilateral groundglass opacities   are unchanged since 3/29/2023.

## 2023-04-06 LAB
ANION GAP SERPL CALC-SCNC: 4 MMOL/L — LOW (ref 5–17)
BASOPHILS # BLD AUTO: 0.02 K/UL — SIGNIFICANT CHANGE UP (ref 0–0.2)
BASOPHILS NFR BLD AUTO: 0.3 % — SIGNIFICANT CHANGE UP (ref 0–2)
BUN SERPL-MCNC: 15 MG/DL — SIGNIFICANT CHANGE UP (ref 7–23)
CALCIUM SERPL-MCNC: 8.8 MG/DL — SIGNIFICANT CHANGE UP (ref 8.5–10.1)
CHLORIDE SERPL-SCNC: 105 MMOL/L — SIGNIFICANT CHANGE UP (ref 96–108)
CO2 SERPL-SCNC: 27 MMOL/L — SIGNIFICANT CHANGE UP (ref 22–31)
CREAT SERPL-MCNC: 0.48 MG/DL — LOW (ref 0.5–1.3)
EGFR: 93 ML/MIN/1.73M2 — SIGNIFICANT CHANGE UP
EOSINOPHIL # BLD AUTO: 0.09 K/UL — SIGNIFICANT CHANGE UP (ref 0–0.5)
EOSINOPHIL NFR BLD AUTO: 1.2 % — SIGNIFICANT CHANGE UP (ref 0–6)
GLUCOSE SERPL-MCNC: 107 MG/DL — HIGH (ref 70–99)
HCT VFR BLD CALC: 33.9 % — LOW (ref 34.5–45)
HGB BLD-MCNC: 11 G/DL — LOW (ref 11.5–15.5)
IMM GRANULOCYTES NFR BLD AUTO: 0.9 % — SIGNIFICANT CHANGE UP (ref 0–0.9)
LYMPHOCYTES # BLD AUTO: 0.84 K/UL — LOW (ref 1–3.3)
LYMPHOCYTES # BLD AUTO: 11.3 % — LOW (ref 13–44)
MAGNESIUM SERPL-MCNC: 1.7 MG/DL — SIGNIFICANT CHANGE UP (ref 1.6–2.6)
MCHC RBC-ENTMCNC: 30.5 PG — SIGNIFICANT CHANGE UP (ref 27–34)
MCHC RBC-ENTMCNC: 32.4 GM/DL — SIGNIFICANT CHANGE UP (ref 32–36)
MCV RBC AUTO: 93.9 FL — SIGNIFICANT CHANGE UP (ref 80–100)
MONOCYTES # BLD AUTO: 1.42 K/UL — HIGH (ref 0–0.9)
MONOCYTES NFR BLD AUTO: 19.1 % — HIGH (ref 2–14)
NEUTROPHILS # BLD AUTO: 4.99 K/UL — SIGNIFICANT CHANGE UP (ref 1.8–7.4)
NEUTROPHILS NFR BLD AUTO: 67.2 % — SIGNIFICANT CHANGE UP (ref 43–77)
PHOSPHATE SERPL-MCNC: 2.3 MG/DL — LOW (ref 2.5–4.5)
PLATELET # BLD AUTO: 158 K/UL — SIGNIFICANT CHANGE UP (ref 150–400)
POTASSIUM SERPL-MCNC: 3.4 MMOL/L — LOW (ref 3.5–5.3)
POTASSIUM SERPL-SCNC: 3.4 MMOL/L — LOW (ref 3.5–5.3)
RBC # BLD: 3.61 M/UL — LOW (ref 3.8–5.2)
RBC # FLD: 15.4 % — HIGH (ref 10.3–14.5)
SARS-COV-2 RNA SPEC QL NAA+PROBE: SIGNIFICANT CHANGE UP
SODIUM SERPL-SCNC: 136 MMOL/L — SIGNIFICANT CHANGE UP (ref 135–145)
WBC # BLD: 7.43 K/UL — SIGNIFICANT CHANGE UP (ref 3.8–10.5)
WBC # FLD AUTO: 7.43 K/UL — SIGNIFICANT CHANGE UP (ref 3.8–10.5)

## 2023-04-06 PROCEDURE — 99232 SBSQ HOSP IP/OBS MODERATE 35: CPT

## 2023-04-06 RX ORDER — SODIUM,POTASSIUM PHOSPHATES 278-250MG
1 POWDER IN PACKET (EA) ORAL THREE TIMES A DAY
Refills: 0 | Status: COMPLETED | OUTPATIENT
Start: 2023-04-06 | End: 2023-04-07

## 2023-04-06 RX ADMIN — CEFEPIME 100 MILLIGRAM(S): 1 INJECTION, POWDER, FOR SOLUTION INTRAMUSCULAR; INTRAVENOUS at 05:20

## 2023-04-06 RX ADMIN — CEFEPIME 100 MILLIGRAM(S): 1 INJECTION, POWDER, FOR SOLUTION INTRAMUSCULAR; INTRAVENOUS at 17:25

## 2023-04-06 RX ADMIN — Medication 200 MILLIGRAM(S): at 23:51

## 2023-04-06 RX ADMIN — ALBUTEROL 2 PUFF(S): 90 AEROSOL, METERED ORAL at 08:12

## 2023-04-06 RX ADMIN — TIOTROPIUM BROMIDE 2 PUFF(S): 18 CAPSULE ORAL; RESPIRATORY (INHALATION) at 08:12

## 2023-04-06 RX ADMIN — RIVAROXABAN 10 MILLIGRAM(S): KIT at 09:01

## 2023-04-06 RX ADMIN — Medication 200 MILLIGRAM(S): at 17:25

## 2023-04-06 RX ADMIN — SERTRALINE 50 MILLIGRAM(S): 25 TABLET, FILM COATED ORAL at 09:01

## 2023-04-06 RX ADMIN — Medication 1 PACKET(S): at 22:14

## 2023-04-06 RX ADMIN — ALBUTEROL 2 PUFF(S): 90 AEROSOL, METERED ORAL at 20:30

## 2023-04-06 RX ADMIN — Medication 1 TABLET(S): at 09:00

## 2023-04-06 RX ADMIN — LOSARTAN POTASSIUM 100 MILLIGRAM(S): 100 TABLET, FILM COATED ORAL at 09:00

## 2023-04-06 RX ADMIN — ALBUTEROL 2 PUFF(S): 90 AEROSOL, METERED ORAL at 13:26

## 2023-04-06 RX ADMIN — Medication 1 PACKET(S): at 13:38

## 2023-04-06 RX ADMIN — Medication 200 MILLIGRAM(S): at 11:14

## 2023-04-06 RX ADMIN — Medication 200 MILLIGRAM(S): at 05:20

## 2023-04-06 RX ADMIN — Medication 25 MILLIGRAM(S): at 09:00

## 2023-04-06 NOTE — PROGRESS NOTE ADULT - NUTRITIONAL ASSESSMENT
This patient has been assessed with a concern for Malnutrition and has been determined to have a diagnosis/diagnoses of Severe protein-calorie malnutrition.    This patient is being managed with:   Diet Minced and Moist-  Mildly Thick Liquids (MILDTHICKLIQS)  Entered: Apr  3 2023  5:14PM  
This patient has been assessed with a concern for Malnutrition and has been determined to have a diagnosis/diagnoses of Severe protein-calorie malnutrition.    This patient is being managed with:   Diet Minced and Moist-  Mildly Thick Liquids (MILDTHICKLIQS)  Entered: Apr 6 2023 11:24AM  
This patient has been assessed with a concern for Malnutrition and has been determined to have a diagnosis/diagnoses of Severe protein-calorie malnutrition.    This patient is being managed with:   Diet Minced and Moist-  Mildly Thick Liquids (MILDTHICKLIQS)  Entered: Apr  3 2023  5:14PM  
This patient has been assessed with a concern for Malnutrition and has been determined to have a diagnosis/diagnoses of Severe protein-calorie malnutrition.    This patient is being managed with:   Diet Minced and Moist-  Mildly Thick Liquids (MILDTHICKLIQS)  Entered: Apr  3 2023  5:14PM

## 2023-04-06 NOTE — PROGRESS NOTE ADULT - SUBJECTIVE AND OBJECTIVE BOX
HPI: 88 y/o F with PMH chronic A fib, dementia (A+Ox1 to 2), HTN, GERD, right hip fracture s/p closed reduction and percutaneous pinning of right hip on 3/29/23, severe MR, severe pulmonary HTN, 4+ TR presented with cough, desaturation to 90% on room air, sent from [facility] to r/o aspiration. The pt is A+Ox0 at the time of my evaluation and unable to provide any additional history. I spoke to the pt's niece over the phone who stated that she visited the pt yesterday and she was congested and coughing which was not the case prior to surgery. She has been increasingly confused and restless.    Subjective: Patient seen this AM, confused, no overnight events    ROS:   All 10 systems reviewed and found to be negative with the exception of what has been described above.    Vital Signs Last 24 Hrs  T(C): 36.8 (06 Apr 2023 08:18), Max: 36.8 (06 Apr 2023 08:18)  T(F): 98.2 (06 Apr 2023 08:18), Max: 98.2 (06 Apr 2023 08:18)  HR: 69 (06 Apr 2023 13:27) (69 - 80)  BP: 138/77 (06 Apr 2023 08:18) (138/76 - 138/77)  BP(mean): --  RR: 19 (06 Apr 2023 08:18) (18 - 19)  SpO2: 100% (06 Apr 2023 08:18) (96% - 100%)    Parameters below as of 06 Apr 2023 08:18  Patient On (Oxygen Delivery Method): nasal cannula  O2 Flow (L/min): 2    PHYSICAL EXAM:  GENERAL: NAD, lying in bed comfortably  HEAD:  Atraumatic, Normocephalic  EYES: conjunctiva and sclera clear  ENT: Moist mucous membranes  NECK: Supple, No JVD  CHEST/LUNG: decreased breath sounds bilaterally. No rales, rhonchi, wheezing. Unlabored respirations  HEART: Regular rate and rhythm; No murmurs  ABDOMEN: Bowel sounds present; Soft, Nontender, Nondistended.   EXTREMITIES:  2+ Peripheral Pulses, brisk capillary refill. No clubbing, cyanosis, or edema  NERVOUS SYSTEM:  Alert & Oriented X1, speech clear.  MSK: FROM all 4 extremities                              11.0   7.43  )-----------( 158      ( 06 Apr 2023 07:58 )             33.9     04-06    136  |  105  |  15  ----------------------------<  107<H>  3.4<L>   |  27  |  0.48<L>    Ca    8.8      06 Apr 2023 07:58  Phos  2.3     04-06  Mg     1.7     04-06            RADIOLOGY:  < from: CT Head No Cont (04.02.23 @ 15:58) >    IMPRESSION: No acute intracranial abnormality.      < from: CT Abdomen and Pelvis No Cont (04.02.23 @ 16:06) >  BLADDER: Within normal limits.  REPRODUCTIVE ORGANS: Uterus and adnexa within normal limits.    BOWEL: No bowel obstruction. Stomach is nondistended. Colonic   diverticuli. Appendix is not visualized.  PERITONEUM: No ascites.  VESSELS: Within normal limits.  RETROPERITONEUM/LYMPH NODES: No lymphadenopathy.  ABDOMINAL WALL: Within normal limits.  BONES: Diffusely demineralized. Hemiarthroplasty of the left hip.   Fixation screws of the proximal right femur. Levoscoliosis of lumbar   spine with multilevel degenerative changes.    IMPRESSION:  No bowel obstruction. Nondistended stomach.    < from: CT Chest No Cont (04.02.23 @ 14:51) >    IMPRESSION:  Patulous, debris-filled esophagus. No retained radiopaque foreign body.    Interval bronchial impaction of the anterior basal rightlower lobe   bronchus with new small ill-defined nodule, may be sequela of recent   choking episode/aspiration. Other patchy bilateral groundglass opacities   are unchanged since 3/29/2023.

## 2023-04-06 NOTE — CONSULT NOTE ADULT - SUBJECTIVE AND OBJECTIVE BOX
HPI:  88 y/o F with PMH A fib, dementia (A+Ox1 to 2), HTN, GERD, right hip fracture s/p closed reduction and percutaneous pinning of right hip on 3/29/23, severe MR, severe pulmonary HTN, 4+ TR presented with cough, desaturation to 90% on room air, sent from [facility] to r/o aspiration. The pt is A+Ox0 at the time of my evaluation and unable to provide any additional history. I spoke to the pt's niece over the phone who stated that she visited the pt yesterday and she was congested and coughing which was not the case prior to surgery. She has been increasingly confused and restless.    Prior admission:   - 3/31/23: Right hip fracture transferred to [facility], [place]     ER course: VSS. Labs: Hb 11.4 (baseline ~12), , monocytes 17%, INR 1.44, lactate 2.3 -> 1.5, glucose 121, alkaline phosphatase 134, AST 47. COVID and RVP negative.     EKG: pending, f/u results     Imaging:   - CT chest: Patulous, debris-filled esophagus. No retained radiopaque foreign body.  Interval bronchial impaction of the anterior basal right lower lobe bronchus with new small ill-defined nodule, may be sequela of recent choking episode/aspiration. Other patchy bilateral groundglass opacities are unchanged since 3/29/2023. Cardiomegaly, massive biatrial enlargement. Thoracic kyphosis.   - CT head:  No acute intracranial abnormality.  - CT abd/pelvis: No bowel obstruction. Nondistended stomach.    Pt was given Aztreonam, Clindamycin, Vancomycin, 1L of NS. She is being placed on  med/surg observation with continuous pulse ox for further management.  (02 Apr 2023 21:23)  ---------------  Patient is awake but confused. No complaints.    PAST MEDICAL & SURGICAL HISTORY:  Migraine      Atrial Fibrillation      Hypertension      GERD (Gastroesophageal Reflux Disease)      Pulmonary hypertension      Hip fracture      History of repair of hip fracture      History of Cataract Surgery  right 2006      Ovarian Cyst      Breast Cyst      History of fracture of right hip          Home Medications:  acetaminophen 325 mg oral tablet: 2 tab(s) orally every 6 hours as needed for  mild pain (02 Apr 2023 20:29)  ipratropium-albuterol 0.5 mg-2.5 mg/3 mL inhalation solution: 3 milliliter(s) inhaled every 6 hours (02 Apr 2023 16:41)  losartan 100 mg oral tablet: 1 tab(s) orally once a day (02 Apr 2023 16:41)  metoprolol succinate 25 mg oral tablet, extended release: 1 tab(s) orally once a day (02 Apr 2023 16:41)  Multiple Vitamins oral tablet: 1 tab(s) orally once a day (02 Apr 2023 16:41)  rivaroxaban 10 mg oral tablet: 1 tab(s) orally once a day (02 Apr 2023 16:41)  sertraline 50 mg oral tablet: 1 tab(s) orally once a day (02 Apr 2023 16:41)      MEDICATIONS  (STANDING):  albuterol    90 MICROgram(s) HFA Inhaler 2 Puff(s) Inhalation every 6 hours  cefepime   IVPB      cefepime   IVPB 2000 milliGRAM(s) IV Intermittent every 12 hours  guaiFENesin Oral Liquid (Sugar-Free) 200 milliGRAM(s) Oral every 6 hours  losartan 100 milliGRAM(s) Oral daily  metoprolol succinate ER 25 milliGRAM(s) Oral daily  multivitamin 1 Tablet(s) Oral daily  potassium phosphate / sodium phosphate Powder (PHOS-NaK) 1 Packet(s) Oral three times a day  rivaroxaban 10 milliGRAM(s) Oral daily  sertraline 50 milliGRAM(s) Oral daily  sodium chloride 0.9%. 1000 milliLiter(s) (75 mL/Hr) IV Continuous <Continuous>  tiotropium 2.5 MICROgram(s) Inhaler 2 Puff(s) Inhalation daily    MEDICATIONS  (PRN):  acetaminophen     Tablet .. 650 milliGRAM(s) Oral every 6 hours PRN Temp greater or equal to 38C (100.4F), Mild Pain (1 - 3)  aluminum hydroxide/magnesium hydroxide/simethicone Suspension 30 milliLiter(s) Oral every 4 hours PRN Dyspepsia  melatonin 3 milliGRAM(s) Oral at bedtime PRN Insomnia  ondansetron Injectable 4 milliGRAM(s) IV Push every 8 hours PRN Nausea and/or Vomiting      Allergies    penicillin (Anaphylaxis)  Zithromax (Hives)    Intolerances        SOCIAL HISTORY:    FAMILY HISTORY:  FH: Alzheimers disease (Father)        ROS  As above  Otherwise unremarkable    Vital Signs Last 24 Hrs  T(C): 36.8 (06 Apr 2023 08:18), Max: 36.8 (06 Apr 2023 08:18)  T(F): 98.2 (06 Apr 2023 08:18), Max: 98.2 (06 Apr 2023 08:18)  HR: 69 (06 Apr 2023 08:18) (65 - 80)  BP: 138/77 (06 Apr 2023 08:18) (138/76 - 149/76)  BP(mean): --  RR: 19 (06 Apr 2023 08:18) (18 - 19)  SpO2: 100% (06 Apr 2023 08:18) (96% - 100%)    Parameters below as of 06 Apr 2023 08:18  Patient On (Oxygen Delivery Method): nasal cannula  O2 Flow (L/min): 2      Constitutional: NAD  Respiratory: CTAB  Cardiovascular: S1 and S2, RRR  Gastrointestinal: BS+, soft, NT/ND  Skin: No rashes    LABS:                        11.0   7.43  )-----------( 158      ( 06 Apr 2023 07:58 )             33.9     04-06    136  |  105  |  15  ----------------------------<  107<H>  3.4<L>   |  27  |  0.48<L>    Ca    8.8      06 Apr 2023 07:58  Phos  2.3     04-06  Mg     1.7     04-06            RADIOLOGY & ADDITIONAL STUDIES:

## 2023-04-06 NOTE — PROGRESS NOTE ADULT - ASSESSMENT
Acute hypoxic respiratory distress (at rehab facility) secondary to aspiration pneumonia   - continuous pulse ox   - SpO2 90% on room air at [facility] -> 99% on 5L nasal cannula   - Titrate off O2 as tolerated   - Does not meet SIRS criteria   - s/p Aztreonam, Clindamycin, Vancomycin in ER; c/w Aztreonam (allergies to Penicillin and Azithromycin)   - BCx x 2 NGTD, F/u sputum culture  - CT chest: Interval bronchial impaction of the anterior basal right lower lobe bronchus with new small ill-defined nodule, may be sequela of recent choking episode/aspiration  - speech and swallow eval noted   - ID consult appreciated, continue cefepime 2g q12h --> day #3  - D/c on augmentin for 7-10 day course      Acute metabolic encephalopathy likely secondary to infectious etiology vs. hospital acquired delirium vs. progression of dementia   - Improved   - CT head: no acute intracranial abnormality   - B12, folate, TSH normal   - PT evaluation     Recent right hip fracture s/p closed reduction and percutaneous pinning of right hip on 3/29/23  - Xarelto 10 mg (for 33 more days given recent hip fracture -> will need to discuss if pt should  be continued on anticoagulation for A fib after 33 days or not, she is a fall risk and was off anticoagulation previously)   - Pt has sutures at site, will need removal in 1 week     Elevated lactic acid on admission   - Lactate 2.3 -> 1.5 after IVF     Normocytic anemia   - Hb 11.4 (baseline ~12), monitor closely     Thrombocytopenia   - , trend     Hyperglycemia   - Prediabetic range A1C     Elevated alkaline phosphatase and AST   - Improving     History of chronic A fib, dementia (AAOx2), HTN, GERD, right hip fracture s/p closed reduction and percutaneous pinning of right hip on 3/29/23, severe MR, severe pulmonary HTN   - c/w home medications  - Monocytes 17% -> f/u with PCP and Heme/Onc outpt   - Other patchy bilateral groundglass opacities are unchanged since 3/29/2023 -> f/u outpt  - Cardiomegaly, 4+ TR -> f/u outpt   - Palliative care evaluation     Severe protein-calorie malnutrition  - nutrition consult appreciated     DISPO: d/c 1-2 days      Acute hypoxic respiratory distress (at rehab facility) secondary to aspiration pneumonia   - continuous pulse ox   - SpO2 90% on room air at [facility] -> 99% on 2L nasal cannula   - Titrate off O2 as tolerated   - Does not meet SIRS criteria   - s/p Aztreonam, Clindamycin, Vancomycin in ER; c/w Aztreonam (allergies to Penicillin and Azithromycin)   - BCx x 2 NGTD, F/u sputum culture  - CT chest: Interval bronchial impaction of the anterior basal right lower lobe bronchus with new small ill-defined nodule, may be sequela of recent choking episode/aspiration  - speech and swallow eval noted   - ID consult appreciated, continue cefepime 2g q12h --> day #3  - D/c on augmentin for 7-10 day course      Acute metabolic encephalopathy likely secondary to infectious etiology vs. hospital acquired delirium vs. progression of dementia   - Improved   - CT head: no acute intracranial abnormality   - B12, folate, TSH normal   - PT evaluation     Recent right hip fracture s/p closed reduction and percutaneous pinning of right hip on 3/29/23  - Xarelto 10 mg (for 33 more days given recent hip fracture -> will need to discuss if pt should  be continued on anticoagulation for A fib after 33 days or not, she is a fall risk and was off anticoagulation previously)   - Pt has sutures at site, will need removal in 1 week     Elevated lactic acid on admission   - Lactate 2.3 -> 1.5 after IVF     Normocytic anemia   - Hb 11.4 (baseline ~12), monitor closely     Thrombocytopenia   - , trend     Hyperglycemia   - Prediabetic range A1C     Elevated alkaline phosphatase and AST   - Improving     History of chronic A fib, dementia (AAOx2), HTN, GERD, right hip fracture s/p closed reduction and percutaneous pinning of right hip on 3/29/23, severe MR, severe pulmonary HTN   - c/w home medications  - Monocytes 17% -> f/u with PCP and Heme/Onc outpt   - Other patchy bilateral groundglass opacities are unchanged since 3/29/2023 -> f/u outpt  - Cardiomegaly, 4+ TR -> f/u outpt   - Palliative care evaluation     Severe protein-calorie malnutrition  - nutrition consult appreciated     DISPO: d/c 1-2 days

## 2023-04-06 NOTE — PROGRESS NOTE ADULT - ASSESSMENT
88 y/o F with PMH A fib, dementia (A+Ox1 to 2), HTN, GERD, right hip fracture s/p closed reduction and percutaneous pinning of right hip on 3/29/23, severe MR, severe pulmonary HTN, 4+ TR presented with cough, desaturation to 90% on room air, sent from [facility] to r/o aspiration. The pt is A+Ox0 at the time of my evaluation and unable to provide any additional history. per pts niece she was congested and coughing which was not the case prior to surgery. She has been increasingly confused and restless. Prior admission: 3/31/23: Right hip fracture transferred to [facility] ER course: VSS. Labs: Hb 11.4 (baseline ~12), , monocytes 17%, INR 1.44, lactate 2.3 -> 1.5, glucose 121, alkaline phosphatase 134, AST 47. COVID and RVP negative. Imaging: CT chest: Patulous, debris-filled esophagus. No retained radiopaque foreign body. Interval bronchial impaction of the anterior basal right lower lobe bronchus with new small ill-defined nodule, may be sequela of recent choking episode/aspiration. Other patchy bilateral groundglass opacities are unchanged since 3/29/2023. CT abd/pelvis: No bowel obstruction. Nondistended stomach. Pt was given Aztreonam, Clindamycin, Vancomycin, 1L of NS. She is being placed on med/surg observation with continuous pulse ox for further management.    1. Acute respiratory failure. Sepsis. Aspiration pneumonia/pneumonitis. PCN allergy.   - per pts niece, unknown pcn allergy  - on IV cefepime [id number] #5  - continue with antibiotic coverage   - monitor for rash/side effects  - f/u cultures - blood cx no growth  - speech/swallow eval noted   - aspiration precautions  - speech/swallow eval noted   - monitor temps  - tolerating abx well so far; no side effects noted  - reason for abx use and side effects reviewed with patient  - on dc augmentin oral solution bid complete 7-10 day course  - supportive care  - fu cbc    2. other issues - care per medicine

## 2023-04-06 NOTE — PROGRESS NOTE ADULT - SUBJECTIVE AND OBJECTIVE BOX
Date of service: 04-06-23 @ 11:12    pt seen and examined  afebrile; laying in bed, nad  confused   no fever    ROS: no fever or chills; denies dizziness, no HA, no abdominal pain, no diarrhea or constipation; no dysuria, no urinary frequency, no legs pain, no rashes      MEDICATIONS  (STANDING):  albuterol    90 MICROgram(s) HFA Inhaler 2 Puff(s) Inhalation every 6 hours  cefepime   IVPB      cefepime   IVPB 2000 milliGRAM(s) IV Intermittent every 12 hours  guaiFENesin Oral Liquid (Sugar-Free) 200 milliGRAM(s) Oral every 6 hours  losartan 100 milliGRAM(s) Oral daily  metoprolol succinate ER 25 milliGRAM(s) Oral daily  multivitamin 1 Tablet(s) Oral daily  potassium phosphate / sodium phosphate Powder (PHOS-NaK) 1 Packet(s) Oral three times a day  rivaroxaban 10 milliGRAM(s) Oral daily  sertraline 50 milliGRAM(s) Oral daily  sodium chloride 0.9%. 1000 milliLiter(s) (75 mL/Hr) IV Continuous <Continuous>  tiotropium 2.5 MICROgram(s) Inhaler 2 Puff(s) Inhalation daily      Vital Signs Last 24 Hrs  T(C): 36.8 (06 Apr 2023 08:18), Max: 36.8 (06 Apr 2023 08:18)  T(F): 98.2 (06 Apr 2023 08:18), Max: 98.2 (06 Apr 2023 08:18)  HR: 69 (06 Apr 2023 08:18) (65 - 80)  BP: 138/77 (06 Apr 2023 08:18) (138/76 - 149/76)  BP(mean): --  RR: 19 (06 Apr 2023 08:18) (18 - 19)  SpO2: 100% (06 Apr 2023 08:18) (96% - 100%)    Parameters below as of 06 Apr 2023 08:18  Patient On (Oxygen Delivery Method): nasal cannula  O2 Flow (L/min): 2      PE:  Constitutional: frail looking  HEENT: NC/AT, EOMI, PERRLA, conjunctivae clear; ears and nose atraumatic; pharynx benign  Neck: supple; thyroid not palpable  Back: no tenderness  Respiratory: decreased breath sounds   Cardiovascular: S1S2 regular, no murmurs  Abdomen: soft, not tender, not distended, positive BS; liver and spleen WNL  Genitourinary: no suprapubic tenderness  Lymphatic: no LN palpable  Musculoskeletal: no muscle tenderness, no joint swelling or tenderness  Extremities: no pedal edema  Neurological/ Psychiatric: no focal deficits   Skin: no rashes; no palpable lesions    Labs: all available labs reviewed                                   11.0   7.43  )-----------( 158      ( 06 Apr 2023 07:58 )             33.9     04-06    136  |  105  |  15  ----------------------------<  107<H>  3.4<L>   |  27  |  0.48<L>    Ca    8.8      06 Apr 2023 07:58  Phos  2.3     04-06  Mg     1.7     04-06        Cultures:   Culture - Blood (04.02.23 @ 16:56)   Specimen Source: .Blood None  Culture Results:   No growth to date.  Culture - Blood (04.02.23 @ 16:23)   Specimen Source: .Blood Blood  Culture Results:   No growth to date.            Radiology: all available radiological tests reviewed  < from: CT Abdomen and Pelvis No Cont (04.02.23 @ 16:06) >    ACC: [id number] EXAM:  CT ABDOMEN AND PELVIS   ORDERED BY: SOLA JULIAN     PROCEDURE DATE:  04/02/2023          INTERPRETATION:  CLINICAL INFORMATION: Possible aspiration. Dilated   esophagus on chest.    COMPARISON: CT chest of earlier the same date.    CONTRAST/COMPLICATIONS:  IV Contrast: NONE  Oral Contrast: NONE  Complications: None reported at time of study completion    PROCEDURE:  CT of the Abdomen and Pelvis was performed.  Sagittal and coronal reformats were performed.    FINDINGS:  LOWER CHEST: Cardiomegaly with massive biatrial enlargement, right   greater than left. Coronary artery calcifications. Trace bilateral   pleural effusions, left greater than right.    LIVER: Within normal limits.  BILE DUCTS: Normal caliber.  GALLBLADDER: Not visualized.  SPLEEN: Within normal limits.  PANCREAS: Within normal limits.  ADRENALS: Within normal limits.  KIDNEYS/URETERS: Within normal limits.    BLADDER: Within normal limits.  REPRODUCTIVE ORGANS: Uterus and adnexa within normal limits.    BOWEL: No bowel obstruction. Stomach is nondistended. Colonic   diverticuli. Appendix is not visualized.  PERITONEUM: No ascites.  VESSELS: Within normal limits.  RETROPERITONEUM/LYMPH NODES: No lymphadenopathy.  ABDOMINAL WALL: Within normal limits.  BONES: Diffusely demineralized. Hemiarthroplasty of the left hip.   Fixation screws of the proximal right femur. Levoscoliosis of lumbar   spine with multilevel degenerative changes.    IMPRESSION:  No bowel obstruction. Nondistended stomach.      < end of copied text >  < from: CT Head No Cont (04.02.23 @ 15:58) >  ACC: [id number] EXAM:  CT BRAIN   ORDERED BY: SOLA JULIAN     PROCEDURE DATE:  04/02/2023          INTERPRETATION:  PROCEDURE: CT brain without intravenous contrast    INDICATION: Possible aspiration    TECHNIQUE: Multiple axial images were obtained at 5 mm intervals from the   skull base to the vertex. Sagittal and coronal reformatted images were   obtained from the axial data set. The images were reviewed in brain and   bone windows.    COMPARISON: 3/27/2023    FINDINGS: The CT examination demonstrates the ventricles, cisternal   spaces, and cortical sulci to be within normal limits. There is no   midline shift or extra axial collections. The gray white differentiation   appears within normal limits. There is no intracranial hemorrhage or   acute transcortical infarct. The bony windows demonstrates no fractures.   The visualized paranasal sinuses are within normal limits. The mastoid   air cells are well aerated.    IMPRESSION: No acute intracranial abnormality.    --- End of Report ---        < end of copied text >  < from: CT Chest No Cont (04.02.23 @ 14:51) >    ACC: [id number] EXAM:  CT CHEST   ORDERED BY: NUBIA HUERTA     PROCEDURE DATE:  04/02/2023          INTERPRETATION:  INDICATION: Status post choking    TECHNIQUE: A volumetric CT acquisition of the chest was obtained from the   thoracic inlet to the upper abdomen without the use of intravenous   contrast. Coronal and sagittal reconstructed images are provided.    COMPARISON: Chest CT 3/29/2023    FINDINGS:    Stable small left greater than right pleural effusions. Few patchy   groundglass opacities are unchanged since 3/29/2023. Interval impaction   of the anterior basal right lower lobe bronchus with new small   ill-defined nodule on series 2, image 80.    Mediastinum/Lymph nodes: No thoracic adenopathy. Patulous, debris-filled   esophagus. No retained radiopaque foreign body.    Heart and Vessels: Cardiomegaly with massive biatrial enlargement.   Coronary arterial, aortic valvular and mitral annular calcification. No   pericardial effusion.    Upper Abdomen: Left renal parapelvic cyst.    Osseous structures and Soft Tissues: No aggressive bone lesions.   Exaggerated thoracic kyphosis.    IMPRESSION:  Patulous, debris-filled esophagus. No retained radiopaque foreign body.    Interval bronchial impaction of the anterior basal rightlower lobe   bronchus with new small ill-defined nodule, may be sequela of recent   choking episode/aspiration. Other patchy bilateral groundglass opacities   are unchanged since 3/29/2023.    --- End of Report ---    < end of copied text >    Advanced directives addressed: full resuscitation

## 2023-04-07 ENCOUNTER — TRANSCRIPTION ENCOUNTER (OUTPATIENT)
Age: 88
End: 2023-04-07

## 2023-04-07 VITALS
DIASTOLIC BLOOD PRESSURE: 73 MMHG | TEMPERATURE: 97 F | SYSTOLIC BLOOD PRESSURE: 144 MMHG | RESPIRATION RATE: 18 BRPM | HEART RATE: 61 BPM | OXYGEN SATURATION: 100 %

## 2023-04-07 LAB
CULTURE RESULTS: SIGNIFICANT CHANGE UP
CULTURE RESULTS: SIGNIFICANT CHANGE UP
SPECIMEN SOURCE: SIGNIFICANT CHANGE UP
SPECIMEN SOURCE: SIGNIFICANT CHANGE UP

## 2023-04-07 PROCEDURE — 99239 HOSP IP/OBS DSCHRG MGMT >30: CPT

## 2023-04-07 RX ADMIN — Medication 200 MILLIGRAM(S): at 11:31

## 2023-04-07 RX ADMIN — Medication 1 PACKET(S): at 06:32

## 2023-04-07 RX ADMIN — Medication 25 MILLIGRAM(S): at 10:14

## 2023-04-07 RX ADMIN — LOSARTAN POTASSIUM 100 MILLIGRAM(S): 100 TABLET, FILM COATED ORAL at 10:13

## 2023-04-07 RX ADMIN — RIVAROXABAN 10 MILLIGRAM(S): KIT at 10:15

## 2023-04-07 RX ADMIN — Medication 200 MILLIGRAM(S): at 06:32

## 2023-04-07 RX ADMIN — CEFEPIME 100 MILLIGRAM(S): 1 INJECTION, POWDER, FOR SOLUTION INTRAMUSCULAR; INTRAVENOUS at 06:32

## 2023-04-07 RX ADMIN — Medication 1 TABLET(S): at 10:14

## 2023-04-07 RX ADMIN — SERTRALINE 50 MILLIGRAM(S): 25 TABLET, FILM COATED ORAL at 10:11

## 2023-04-07 RX ADMIN — ALBUTEROL 2 PUFF(S): 90 AEROSOL, METERED ORAL at 14:40

## 2023-04-07 RX ADMIN — ALBUTEROL 2 PUFF(S): 90 AEROSOL, METERED ORAL at 09:37

## 2023-04-07 NOTE — DISCHARGE NOTE NURSING/CASE MANAGEMENT/SOCIAL WORK - PATIENT PORTAL LINK FT
You can access the FollowMyHealth Patient Portal offered by [facility] by registering at the following website: [email or web address]. By joining [facility]’s FollowMyHealth portal, you will also be able to view your health information using other applications (apps) compatible with our system.

## 2023-04-07 NOTE — DISCHARGE NOTE PROVIDER - DETAILS OF MALNUTRITION DIAGNOSIS/DIAGNOSES
This patient has been assessed with a concern for Malnutrition and was treated during this hospitalization for the following Nutrition diagnosis/diagnoses:     -  04/03/2023: Severe protein-calorie malnutrition

## 2023-04-07 NOTE — DISCHARGE NOTE PROVIDER - NSDCMRMEDTOKEN_GEN_ALL_CORE_FT
acetaminophen 325 mg oral tablet: 2 tab(s) orally every 6 hours as needed for  mild pain  cefuroxime 500 mg oral tablet: 1 orally 2 times a day  guaiFENesin 100 mg/5 mL oral liquid: 10 milliliter(s) orally every 6 hours  ipratropium-albuterol 0.5 mg-2.5 mg/3 mL inhalation solution: 3 milliliter(s) inhaled every 6 hours  losartan 100 mg oral tablet: 1 tab(s) orally once a day  metoprolol succinate 25 mg oral tablet, extended release: 1 tab(s) orally once a day  Multiple Vitamins oral tablet: 1 tab(s) orally once a day  rivaroxaban 10 mg oral tablet: 1 tab(s) orally once a day  sertraline 50 mg oral tablet: 1 tab(s) orally once a day

## 2023-04-07 NOTE — DISCHARGE NOTE PROVIDER - NSDCCPCAREPLAN_GEN_ALL_CORE_FT
PRINCIPAL DISCHARGE DIAGNOSIS  Diagnosis: Pneumonia, aspiration  Assessment and Plan of Treatment: .You were found to have pneumonia which is an infection in one or both of the lungs. It causes the air sacs of the lungs to fill up with fluid or pus. It can range from mild to severe, depending on the type of germ causing the infection, your age, and your overall health. Continue to take antibiotics for 4 more days (sent to pharmacy). Continue to stay hydrated. **Monitor for the following signs/symptoms: Fever > 101, chills, cough with phlegm, shortness of breath and nausea and/or vomiting. If you experience these signs/symptoms please alert your primary care provider or if your signs/symptoms are severe please return to the ED**      SECONDARY DISCHARGE DIAGNOSES  Diagnosis: High serum lactic acid  Assessment and Plan of Treatment:

## 2023-04-07 NOTE — DISCHARGE NOTE PROVIDER - HOSPITAL COURSE
· Subjective and Objective:   HPI: 86 y/o F with PMH chronic A fib, dementia (A+Ox1 to 2), HTN, GERD, right hip fracture s/p closed reduction and percutaneous pinning of right hip on 3/29/23, severe MR, severe pulmonary HTN, 4+ TR presented with cough, desaturation to 90% on room air, sent from [facility] to r/o aspiration. The pt is A+Ox0 at the time of my evaluation and unable to provide any additional history. I spoke to the pt's niece over the phone who stated that she visited the pt yesterday and she was congested and coughing which was not the case prior to surgery. She has been increasingly confused and restless.    pt admitted with metabolic encephalopathy in setting of pna.  pt seen by ID and treated with cefepime and now will dc on po ceftin as per ID recs. pt seen by ortho and sutures to be removed on pod 14.  s/s eval noted and pt on minced and moist with mildly thick liquids.      Vital Signs Last 24 Hrs  T(C): 36.2 (07 Apr 2023 08:57), Max: 37.6 (07 Apr 2023 06:39)  T(F): 97.2 (07 Apr 2023 08:57), Max: 99.7 (07 Apr 2023 06:39)  HR: 70 (07 Apr 2023 09:44) (61 - 70)  BP: 144/73 (07 Apr 2023 08:57) (144/73 - 160/86)  BP(mean): --  RR: 18 (07 Apr 2023 08:57) (18 - 19)  SpO2: 100% (07 Apr 2023 08:57) (98% - 100%)    Parameters below as of 07 Apr 2023 08:57  Patient On (Oxygen Delivery Method): nasal cannula  O2 Flow (L/min): 2      PHYSICAL EXAM:  GENERAL: NAD, lying in bed comfortably  HEAD:  Atraumatic, Normocephalic  EYES: conjunctiva and sclera clear  ENT: Moist mucous membranes  NECK: Supple, No JVD  CHEST/LUNG: decreased breath sounds bilaterally. No rales, rhonchi, wheezing. Unlabored respirations  HEART: Regular rate and rhythm; No murmurs  ABDOMEN: Bowel sounds present; Soft, Nontender, Nondistended.   EXTREMITIES:  2+ Peripheral Pulses, right hip sutures in place   NERVOUS SYSTEM:  Alert & Oriented X1, speech clear.  MSK: FROM all 4 extremities    	  Acute hypoxic respiratory distress (at rehab facility) secondary to aspiration pneumonia   - case d/w dr lopez will dc on 4 more days of ceftin. completed 6 days of IV cefepime  - aspiration precautions.  no intervention by GI as minimal food noted in esophagus.  case d/w s/s and will dc on minced and moist with mildly thick liquids.  she can never bgo back to think liquids as per speech and swallow  - prelim cx negative  - CT chest: Interval bronchial impaction of the anterior basal right lower lobe bronchus with new small ill-defined nodule, may be sequela of recent choking episode/aspiration    Acute metabolic encephalopathy likely secondary to infectious etiology vs. hospital acquired delirium vs. progression of dementia   - pt confused and AAO X 0 most of the time  - CT head: no acute intracranial abnormality   - B12, folate, TSH normal   - PT evaluation noted and ambulates with PT at times    Recent right hip fracture s/p closed reduction and percutaneous pinning of right hip on 3/29/23  - Xarelto 10 mg (for 30 more days given recent hip fracture -> will need to discuss if pt should  be continued on anticoagulation for A fib after 30 days or not, she is a fall risk and was off anticoagulation previously)   - Pt has sutures at site, will need removal on 4/12/23 as per ortho     Normocytic anemia   - Hb 11.4 (baseline ~12), monitor closely     Thrombocytopenia   - , trend  stable    Hyperglycemia   - Prediabetic range A1C     History of chronic A fib, dementia (AAOx2), HTN, GERD, right hip fracture s/p closed reduction and percutaneous pinning of right hip on 3/29/23, severe MR, severe pulmonary HTN   - c/w home medications  - Monocytes 17% -> f/u with PCP and Heme/Onc outpt   - Other patchy bilateral groundglass opacities are unchanged since 3/29/2023 -> f/u outpt  - Cardiomegaly, 4+ TR -> f/u outpt   - Palliative care evaluation - see note for GOC - pt remains full code     Severe protein-calorie malnutrition  - nutrition consult appreciated   dc to [facility]  time spent 35 mins

## 2023-04-07 NOTE — PROGRESS NOTE ADULT - PROVIDER SPECIALTY LIST ADULT
Hospitalist
Hospitalist
Infectious Disease
Hospitalist
Hospitalist
Infectious Disease
Palliative Care

## 2023-04-07 NOTE — PROGRESS NOTE ADULT - SUBJECTIVE AND OBJECTIVE BOX
Date of service: 04-07-23 @ 11:28    pt seen and examined  afebrile  no resp distress  confused   no fever    ROS: no fever or chills; denies dizziness, no HA, no abdominal pain, no diarrhea or constipation; no dysuria, no urinary frequency, no legs pain, no rashes    MEDICATIONS  (STANDING):  albuterol    90 MICROgram(s) HFA Inhaler 2 Puff(s) Inhalation every 6 hours  cefepime   IVPB      cefepime   IVPB 2000 milliGRAM(s) IV Intermittent every 12 hours  guaiFENesin Oral Liquid (Sugar-Free) 200 milliGRAM(s) Oral every 6 hours  losartan 100 milliGRAM(s) Oral daily  metoprolol succinate ER 25 milliGRAM(s) Oral daily  multivitamin 1 Tablet(s) Oral daily  rivaroxaban 10 milliGRAM(s) Oral daily  sertraline 50 milliGRAM(s) Oral daily  sodium chloride 0.9%. 1000 milliLiter(s) (75 mL/Hr) IV Continuous <Continuous>  tiotropium 2.5 MICROgram(s) Inhaler 2 Puff(s) Inhalation daily    Vital Signs Last 24 Hrs  T(C): 36.2 (07 Apr 2023 08:57), Max: 37.6 (07 Apr 2023 06:39)  T(F): 97.2 (07 Apr 2023 08:57), Max: 99.7 (07 Apr 2023 06:39)  HR: 70 (07 Apr 2023 09:44) (61 - 70)  BP: 144/73 (07 Apr 2023 08:57) (144/73 - 160/86)  BP(mean): --  RR: 18 (07 Apr 2023 08:57) (18 - 19)  SpO2: 100% (07 Apr 2023 08:57) (98% - 100%)    Parameters below as of 07 Apr 2023 08:57  Patient On (Oxygen Delivery Method): nasal cannula  O2 Flow (L/min): 2      PE:  Constitutional: frail looking  HEENT: NC/AT, EOMI, PERRLA, conjunctivae clear; ears and nose atraumatic; pharynx benign  Neck: supple; thyroid not palpable  Back: no tenderness  Respiratory: decreased breath sounds   Cardiovascular: S1S2 regular, no murmurs  Abdomen: soft, not tender, not distended, positive BS; liver and spleen WNL  Genitourinary: no suprapubic tenderness  Lymphatic: no LN palpable  Musculoskeletal: no muscle tenderness, no joint swelling or tenderness  Extremities: no pedal edema  Neurological/ Psychiatric: no focal deficits   Skin: no rashes; no palpable lesions    Labs: all available labs reviewed                                                  11.0   7.43  )-----------( 158      ( 06 Apr 2023 07:58 )             33.9     04-06    136  |  105  |  15  ----------------------------<  107<H>  3.4<L>   |  27  |  0.48<L>    Ca    8.8      06 Apr 2023 07:58  Phos  2.3     04-06  Mg     1.7     04-06        Cultures:   Culture - Blood (04.02.23 @ 16:56)   Specimen Source: .Blood None  Culture Results:   No growth to date.  Culture - Blood (04.02.23 @ 16:23)   Specimen Source: .Blood Blood  Culture Results:   No growth to date.    Radiology: all available radiological tests reviewed      ACC: [id number] EXAM:  CT ABDOMEN AND PELVIS   ORDERED BY: SOLA JULIAN     PROCEDURE DATE:  04/02/2023          INTERPRETATION:  CLINICAL INFORMATION: Possible aspiration. Dilated   esophagus on chest.    COMPARISON: CT chest of earlier the same date.    CONTRAST/COMPLICATIONS:  IV Contrast: NONE  Oral Contrast: NONE  Complications: None reported at time of study completion    PROCEDURE:  CT of the Abdomen and Pelvis was performed.  Sagittal and coronal reformats were performed.    FINDINGS:  LOWER CHEST: Cardiomegaly with massive biatrial enlargement, right   greater than left. Coronary artery calcifications. Trace bilateral   pleural effusions, left greater than right.    LIVER: Within normal limits.  BILE DUCTS: Normal caliber.  GALLBLADDER: Not visualized.  SPLEEN: Within normal limits.  PANCREAS: Within normal limits.  ADRENALS: Within normal limits.  KIDNEYS/URETERS: Within normal limits.    BLADDER: Within normal limits.  REPRODUCTIVE ORGANS: Uterus and adnexa within normal limits.    BOWEL: No bowel obstruction. Stomach is nondistended. Colonic   diverticuli. Appendix is not visualized.  PERITONEUM: No ascites.  VESSELS: Within normal limits.  RETROPERITONEUM/LYMPH NODES: No lymphadenopathy.  ABDOMINAL WALL: Within normal limits.  BONES: Diffusely demineralized. Hemiarthroplasty of the left hip.   Fixation screws of the proximal right femur. Levoscoliosis of lumbar   spine with multilevel degenerative changes.    IMPRESSION:  No bowel obstruction. Nondistended stomach.      < end of copied text >  < from: CT Head No Cont (04.02.23 @ 15:58) >  ACC: [id number] EXAM:  CT BRAIN   ORDERED BY: SOLA JULIAN     PROCEDURE DATE:  04/02/2023          INTERPRETATION:  PROCEDURE: CT brain without intravenous contrast    INDICATION: Possible aspiration    TECHNIQUE: Multiple axial images were obtained at 5 mm intervals from the   skull base to the vertex. Sagittal and coronal reformatted images were   obtained from the axial data set. The images were reviewed in brain and   bone windows.    COMPARISON: 3/27/2023    FINDINGS: The CT examination demonstrates the ventricles, cisternal   spaces, and cortical sulci to be within normal limits. There is no   midline shift or extra axial collections. The gray white differentiation   appears within normal limits. There is no intracranial hemorrhage or   acute transcortical infarct. The bony windows demonstrates no fractures.   The visualized paranasal sinuses are within normal limits. The mastoid   air cells are well aerated.    IMPRESSION: No acute intracranial abnormality.    --- End of Report ---        < end of copied text >  < from: CT Chest No Cont (04.02.23 @ 14:51) >    ACC: [id number] EXAM:  CT CHEST   ORDERED BY: NUBIA HUERTA     PROCEDURE DATE:  04/02/2023          INTERPRETATION:  INDICATION: Status post choking    TECHNIQUE: A volumetric CT acquisition of the chest was obtained from the   thoracic inlet to the upper abdomen without the use of intravenous   contrast. Coronal and sagittal reconstructed images are provided.    COMPARISON: Chest CT 3/29/2023    FINDINGS:    Stable small left greater than right pleural effusions. Few patchy   groundglass opacities are unchanged since 3/29/2023. Interval impaction   of the anterior basal right lower lobe bronchus with new small   ill-defined nodule on series 2, image 80.    Mediastinum/Lymph nodes: No thoracic adenopathy. Patulous, debris-filled   esophagus. No retained radiopaque foreign body.    Heart and Vessels: Cardiomegaly with massive biatrial enlargement.   Coronary arterial, aortic valvular and mitral annular calcification. No   pericardial effusion.    Upper Abdomen: Left renal parapelvic cyst.    Osseous structures and Soft Tissues: No aggressive bone lesions.   Exaggerated thoracic kyphosis.    IMPRESSION:  Patulous, debris-filled esophagus. No retained radiopaque foreign body.    Interval bronchial impaction of the anterior basal rightlower lobe   bronchus with new small ill-defined nodule, may be sequela of recent   choking episode/aspiration. Other patchy bilateral groundglass opacities   are unchanged since 3/29/2023.    --- End of Report ---    < end of copied text >    Advanced directives addressed: full resuscitation

## 2023-04-07 NOTE — DISCHARGE NOTE NURSING/CASE MANAGEMENT/SOCIAL WORK - NSDCPEFALRISK_GEN_ALL_CORE
For information on Fall & Injury Prevention, visit: [email or web address] OR  [email or web address] OR  https://www.cdc.gov/steadi/patient.html

## 2023-04-07 NOTE — PROGRESS NOTE ADULT - ASSESSMENT
86 y/o F with PMH A fib, dementia (A+Ox1 to 2), HTN, GERD, right hip fracture s/p closed reduction and percutaneous pinning of right hip on 3/29/23, severe MR, severe pulmonary HTN, 4+ TR presented with cough, desaturation to 90% on room air, sent from [facility] to r/o aspiration. The pt is A+Ox0 at the time of my evaluation and unable to provide any additional history. per pts niece she was congested and coughing which was not the case prior to surgery. She has been increasingly confused and restless. Prior admission: 3/31/23: Right hip fracture transferred to [facility] ER course: VSS. Labs: Hb 11.4 (baseline ~12), , monocytes 17%, INR 1.44, lactate 2.3 -> 1.5, glucose 121, alkaline phosphatase 134, AST 47. COVID and RVP negative. Imaging: CT chest: Patulous, debris-filled esophagus. No retained radiopaque foreign body. Interval bronchial impaction of the anterior basal right lower lobe bronchus with new small ill-defined nodule, may be sequela of recent choking episode/aspiration. Other patchy bilateral groundglass opacities are unchanged since 3/29/2023. CT abd/pelvis: No bowel obstruction. Nondistended stomach. Pt was given Aztreonam, Clindamycin, Vancomycin, 1L of NS. She is being placed on med/surg observation with continuous pulse ox for further management.    1. Acute respiratory failure. Sepsis. Aspiration pneumonia/pneumonitis. PCN allergy.   - per pts niece, unknown pcn allergy  - on IV cefepime [id number] #6  - continue with antibiotic coverage   - monitor for rash/side effects  - f/u cultures - blood cx no growth  - speech/swallow eval noted   - aspiration precautions  - speech/swallow eval noted   - monitor temps  - tolerating abx well so far; no side effects noted  - reason for abx use and side effects reviewed with patient  - on dc augmentin oral solution bid complete 7-10 day course  - supportive care  - fu cbc    2. other issues - care per medicine

## 2023-04-07 NOTE — DISCHARGE NOTE PROVIDER - CARE PROVIDER_API CALL
Adair Woodard)  Orthopaedic Surgery  [address] [place] [address], [address]  [place] [address]  Phone: [phone]  Fax: [phone]  Follow Up Time:     Gerardo Louie)  Internal Medicine  [facility]-Department of Medicine, [address] [place] [id number]  Phone: [phone]  Fax: [phone]  Follow Up Time:

## 2023-04-07 NOTE — DISCHARGE NOTE PROVIDER - CARE PROVIDERS DIRECT ADDRESSES
(Allergic) Recommend OTC Pataday QD OU, PRN itching. Condition discussed with patient. Avoidance of allergens recommended. ,DirectAddress_Unknown,DirectAddress_Unknown

## 2023-04-08 ENCOUNTER — TRANSCRIPTION ENCOUNTER (OUTPATIENT)
Age: 88
End: 2023-04-08

## 2023-04-10 DIAGNOSIS — R09.02 HYPOXEMIA: ICD-10-CM

## 2023-04-10 DIAGNOSIS — S72.011A UNSPECIFIED INTRACAPSULAR FRACTURE OF RIGHT FEMUR, INITIAL ENCOUNTER FOR CLOSED FRACTURE: ICD-10-CM

## 2023-04-10 DIAGNOSIS — I10 ESSENTIAL (PRIMARY) HYPERTENSION: ICD-10-CM

## 2023-04-10 DIAGNOSIS — I51.7 CARDIOMEGALY: ICD-10-CM
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
## 2023-04-10 DIAGNOSIS — Z88.1 ALLERGY STATUS TO OTHER ANTIBIOTIC AGENTS STATUS: ICD-10-CM

## 2023-04-10 DIAGNOSIS — K21.9 GASTRO-ESOPHAGEAL REFLUX DISEASE WITHOUT ESOPHAGITIS: ICD-10-CM

## 2023-04-10 DIAGNOSIS — Z79.01 LONG TERM (CURRENT) USE OF ANTICOAGULANTS: ICD-10-CM

## 2023-04-10 DIAGNOSIS — G43.909 MIGRAINE, UNSPECIFIED, NOT INTRACTABLE, WITHOUT STATUS MIGRAINOSUS: ICD-10-CM
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
## 2023-04-10 DIAGNOSIS — F03.90 UNSPECIFIED DEMENTIA, UNSPECIFIED SEVERITY, WITHOUT BEHAVIORAL DISTURBANCE, PSYCHOTIC DISTURBANCE, MOOD DISTURBANCE, AND ANXIETY: ICD-10-CM

## 2023-04-10 DIAGNOSIS — I08.1 RHEUMATIC DISORDERS OF BOTH MITRAL AND TRICUSPID VALVES: ICD-10-CM

## 2023-04-10 DIAGNOSIS — Z20.822 CONTACT WITH AND (SUSPECTED) EXPOSURE TO COVID-19: ICD-10-CM

## 2023-04-10 DIAGNOSIS — Z88.0 ALLERGY STATUS TO PENICILLIN: ICD-10-CM

## 2023-04-10 DIAGNOSIS — E87.1 HYPO-OSMOLALITY AND HYPONATREMIA: ICD-10-CM

## 2023-04-10 DIAGNOSIS — I48.21 PERMANENT ATRIAL FIBRILLATION: ICD-10-CM

## 2023-04-10 DIAGNOSIS — I27.20 PULMONARY HYPERTENSION, UNSPECIFIED: ICD-10-CM

## 2023-04-12 DIAGNOSIS — R73.9 HYPERGLYCEMIA, UNSPECIFIED: ICD-10-CM

## 2023-04-12 DIAGNOSIS — F03.90 UNSPECIFIED DEMENTIA, UNSPECIFIED SEVERITY, WITHOUT BEHAVIORAL DISTURBANCE, PSYCHOTIC DISTURBANCE, MOOD DISTURBANCE, AND ANXIETY: ICD-10-CM

## 2023-04-12 DIAGNOSIS — J69.0 PNEUMONITIS DUE TO INHALATION OF FOOD AND VOMIT: ICD-10-CM

## 2023-04-12 DIAGNOSIS — I34.0 NONRHEUMATIC MITRAL (VALVE) INSUFFICIENCY: ICD-10-CM

## 2023-04-12 DIAGNOSIS — E43 UNSPECIFIED SEVERE PROTEIN-CALORIE MALNUTRITION: ICD-10-CM

## 2023-04-12 DIAGNOSIS — Z88.0 ALLERGY STATUS TO PENICILLIN: ICD-10-CM

## 2023-04-12 DIAGNOSIS — I27.20 PULMONARY HYPERTENSION, UNSPECIFIED: ICD-10-CM

## 2023-04-12 DIAGNOSIS — R74.8 ABNORMAL LEVELS OF OTHER SERUM ENZYMES: ICD-10-CM

## 2023-04-12 DIAGNOSIS — G93.41 METABOLIC ENCEPHALOPATHY: ICD-10-CM

## 2023-04-12 DIAGNOSIS — K21.9 GASTRO-ESOPHAGEAL REFLUX DISEASE WITHOUT ESOPHAGITIS: ICD-10-CM

## 2023-04-12 DIAGNOSIS — J96.01 ACUTE RESPIRATORY FAILURE WITH HYPOXIA: ICD-10-CM

## 2023-04-12 DIAGNOSIS — D69.6 THROMBOCYTOPENIA, UNSPECIFIED: ICD-10-CM

## 2023-04-12 DIAGNOSIS — I48.20 CHRONIC ATRIAL FIBRILLATION, UNSPECIFIED: ICD-10-CM

## 2023-04-12 DIAGNOSIS — D64.9 ANEMIA, UNSPECIFIED: ICD-10-CM

## 2023-05-03 ENCOUNTER — TRANSCRIPTION ENCOUNTER (OUTPATIENT)
Age: 88
End: 2023-05-03

## 2023-11-07 ENCOUNTER — INPATIENT (INPATIENT)
Facility: HOSPITAL | Age: 88
LOS: 6 days | Discharge: NURSING, REHAB OR LONG-TERM CARE | DRG: 202 | End: 2023-11-14
Attending: FAMILY MEDICINE | Admitting: INTERNAL MEDICINE
Payer: MEDICARE

## 2023-11-07 VITALS
HEART RATE: 68 BPM | TEMPERATURE: 98 F | OXYGEN SATURATION: 97 % | SYSTOLIC BLOOD PRESSURE: 160 MMHG | RESPIRATION RATE: 18 BRPM | DIASTOLIC BLOOD PRESSURE: 79 MMHG

## 2023-11-07 DIAGNOSIS — Z87.81 PERSONAL HISTORY OF (HEALED) TRAUMATIC FRACTURE: Chronic | ICD-10-CM

## 2023-11-07 PROBLEM — S72.009A FRACTURE OF UNSPECIFIED PART OF NECK OF UNSPECIFIED FEMUR, INITIAL ENCOUNTER FOR CLOSED FRACTURE: Chronic | Status: ACTIVE | Noted: 2023-04-02

## 2023-11-07 PROBLEM — Z98.890 OTHER SPECIFIED POSTPROCEDURAL STATES: Chronic | Status: ACTIVE | Noted: 2023-04-02

## 2023-11-07 LAB
ALBUMIN SERPL ELPH-MCNC: 3.5 G/DL — SIGNIFICANT CHANGE UP (ref 3.3–5)
ALBUMIN SERPL ELPH-MCNC: 3.5 G/DL — SIGNIFICANT CHANGE UP (ref 3.3–5)
ALP SERPL-CCNC: 92 U/L — SIGNIFICANT CHANGE UP (ref 40–120)
ALP SERPL-CCNC: 92 U/L — SIGNIFICANT CHANGE UP (ref 40–120)
ALT FLD-CCNC: 16 U/L — SIGNIFICANT CHANGE UP (ref 12–78)
ALT FLD-CCNC: 16 U/L — SIGNIFICANT CHANGE UP (ref 12–78)
ANION GAP SERPL CALC-SCNC: 9 MMOL/L — SIGNIFICANT CHANGE UP (ref 5–17)
ANION GAP SERPL CALC-SCNC: 9 MMOL/L — SIGNIFICANT CHANGE UP (ref 5–17)
APPEARANCE UR: CLEAR — SIGNIFICANT CHANGE UP
APPEARANCE UR: CLEAR — SIGNIFICANT CHANGE UP
APTT BLD: 34.6 SEC — SIGNIFICANT CHANGE UP (ref 24.5–35.6)
APTT BLD: 34.6 SEC — SIGNIFICANT CHANGE UP (ref 24.5–35.6)
AST SERPL-CCNC: 22 U/L — SIGNIFICANT CHANGE UP (ref 15–37)
AST SERPL-CCNC: 22 U/L — SIGNIFICANT CHANGE UP (ref 15–37)
BASE EXCESS BLDV CALC-SCNC: 1 MMOL/L — SIGNIFICANT CHANGE UP (ref -2–3)
BASE EXCESS BLDV CALC-SCNC: 1 MMOL/L — SIGNIFICANT CHANGE UP (ref -2–3)
BASOPHILS # BLD AUTO: 0.02 K/UL — SIGNIFICANT CHANGE UP (ref 0–0.2)
BASOPHILS # BLD AUTO: 0.02 K/UL — SIGNIFICANT CHANGE UP (ref 0–0.2)
BASOPHILS NFR BLD AUTO: 0.6 % — SIGNIFICANT CHANGE UP (ref 0–2)
BASOPHILS NFR BLD AUTO: 0.6 % — SIGNIFICANT CHANGE UP (ref 0–2)
BILIRUB SERPL-MCNC: 1.3 MG/DL — HIGH (ref 0.2–1.2)
BILIRUB SERPL-MCNC: 1.3 MG/DL — HIGH (ref 0.2–1.2)
BILIRUB UR-MCNC: NEGATIVE — SIGNIFICANT CHANGE UP
BILIRUB UR-MCNC: NEGATIVE — SIGNIFICANT CHANGE UP
BLD GP AB SCN SERPL QL: SIGNIFICANT CHANGE UP
BLD GP AB SCN SERPL QL: SIGNIFICANT CHANGE UP
BUN SERPL-MCNC: 16 MG/DL — SIGNIFICANT CHANGE UP (ref 7–23)
BUN SERPL-MCNC: 16 MG/DL — SIGNIFICANT CHANGE UP (ref 7–23)
CALCIUM SERPL-MCNC: 8.9 MG/DL — SIGNIFICANT CHANGE UP (ref 8.5–10.1)
CALCIUM SERPL-MCNC: 8.9 MG/DL — SIGNIFICANT CHANGE UP (ref 8.5–10.1)
CHLORIDE SERPL-SCNC: 91 MMOL/L — LOW (ref 96–108)
CHLORIDE SERPL-SCNC: 91 MMOL/L — LOW (ref 96–108)
CO2 SERPL-SCNC: 24 MMOL/L — SIGNIFICANT CHANGE UP (ref 22–31)
CO2 SERPL-SCNC: 24 MMOL/L — SIGNIFICANT CHANGE UP (ref 22–31)
COLOR SPEC: YELLOW — SIGNIFICANT CHANGE UP
COLOR SPEC: YELLOW — SIGNIFICANT CHANGE UP
CREAT SERPL-MCNC: 0.65 MG/DL — SIGNIFICANT CHANGE UP (ref 0.5–1.3)
CREAT SERPL-MCNC: 0.65 MG/DL — SIGNIFICANT CHANGE UP (ref 0.5–1.3)
DIFF PNL FLD: NEGATIVE — SIGNIFICANT CHANGE UP
DIFF PNL FLD: NEGATIVE — SIGNIFICANT CHANGE UP
EGFR: 86 ML/MIN/1.73M2 — SIGNIFICANT CHANGE UP
EGFR: 86 ML/MIN/1.73M2 — SIGNIFICANT CHANGE UP
EOSINOPHIL # BLD AUTO: 0.03 K/UL — SIGNIFICANT CHANGE UP (ref 0–0.5)
EOSINOPHIL # BLD AUTO: 0.03 K/UL — SIGNIFICANT CHANGE UP (ref 0–0.5)
EOSINOPHIL NFR BLD AUTO: 0.9 % — SIGNIFICANT CHANGE UP (ref 0–6)
EOSINOPHIL NFR BLD AUTO: 0.9 % — SIGNIFICANT CHANGE UP (ref 0–6)
GLUCOSE SERPL-MCNC: 80 MG/DL — SIGNIFICANT CHANGE UP (ref 70–99)
GLUCOSE SERPL-MCNC: 80 MG/DL — SIGNIFICANT CHANGE UP (ref 70–99)
GLUCOSE UR QL: NEGATIVE MG/DL — SIGNIFICANT CHANGE UP
GLUCOSE UR QL: NEGATIVE MG/DL — SIGNIFICANT CHANGE UP
HCO3 BLDV-SCNC: 26 MMOL/L — SIGNIFICANT CHANGE UP (ref 22–29)
HCO3 BLDV-SCNC: 26 MMOL/L — SIGNIFICANT CHANGE UP (ref 22–29)
HCT VFR BLD CALC: 37.1 % — SIGNIFICANT CHANGE UP (ref 34.5–45)
HCT VFR BLD CALC: 37.1 % — SIGNIFICANT CHANGE UP (ref 34.5–45)
HGB BLD-MCNC: 12.6 G/DL — SIGNIFICANT CHANGE UP (ref 11.5–15.5)
HGB BLD-MCNC: 12.6 G/DL — SIGNIFICANT CHANGE UP (ref 11.5–15.5)
IMM GRANULOCYTES NFR BLD AUTO: 0.3 % — SIGNIFICANT CHANGE UP (ref 0–0.9)
IMM GRANULOCYTES NFR BLD AUTO: 0.3 % — SIGNIFICANT CHANGE UP (ref 0–0.9)
INR BLD: 1.25 RATIO — HIGH (ref 0.85–1.18)
INR BLD: 1.25 RATIO — HIGH (ref 0.85–1.18)
KETONES UR-MCNC: ABNORMAL MG/DL
KETONES UR-MCNC: ABNORMAL MG/DL
LACTATE SERPL-SCNC: 1.3 MMOL/L — SIGNIFICANT CHANGE UP (ref 0.7–2)
LACTATE SERPL-SCNC: 1.3 MMOL/L — SIGNIFICANT CHANGE UP (ref 0.7–2)
LEUKOCYTE ESTERASE UR-ACNC: NEGATIVE — SIGNIFICANT CHANGE UP
LEUKOCYTE ESTERASE UR-ACNC: NEGATIVE — SIGNIFICANT CHANGE UP
LYMPHOCYTES # BLD AUTO: 0.75 K/UL — LOW (ref 1–3.3)
LYMPHOCYTES # BLD AUTO: 0.75 K/UL — LOW (ref 1–3.3)
LYMPHOCYTES # BLD AUTO: 23.4 % — SIGNIFICANT CHANGE UP (ref 13–44)
LYMPHOCYTES # BLD AUTO: 23.4 % — SIGNIFICANT CHANGE UP (ref 13–44)
MANUAL SMEAR VERIFICATION: SIGNIFICANT CHANGE UP
MANUAL SMEAR VERIFICATION: SIGNIFICANT CHANGE UP
MCHC RBC-ENTMCNC: 30.8 PG — SIGNIFICANT CHANGE UP (ref 27–34)
MCHC RBC-ENTMCNC: 30.8 PG — SIGNIFICANT CHANGE UP (ref 27–34)
MCHC RBC-ENTMCNC: 34 GM/DL — SIGNIFICANT CHANGE UP (ref 32–36)
MCHC RBC-ENTMCNC: 34 GM/DL — SIGNIFICANT CHANGE UP (ref 32–36)
MCV RBC AUTO: 90.7 FL — SIGNIFICANT CHANGE UP (ref 80–100)
MCV RBC AUTO: 90.7 FL — SIGNIFICANT CHANGE UP (ref 80–100)
MONOCYTES # BLD AUTO: 0.7 K/UL — SIGNIFICANT CHANGE UP (ref 0–0.9)
MONOCYTES # BLD AUTO: 0.7 K/UL — SIGNIFICANT CHANGE UP (ref 0–0.9)
MONOCYTES NFR BLD AUTO: 21.8 % — HIGH (ref 2–14)
MONOCYTES NFR BLD AUTO: 21.8 % — HIGH (ref 2–14)
NEUTROPHILS # BLD AUTO: 1.7 K/UL — LOW (ref 1.8–7.4)
NEUTROPHILS # BLD AUTO: 1.7 K/UL — LOW (ref 1.8–7.4)
NEUTROPHILS NFR BLD AUTO: 53 % — SIGNIFICANT CHANGE UP (ref 43–77)
NEUTROPHILS NFR BLD AUTO: 53 % — SIGNIFICANT CHANGE UP (ref 43–77)
NITRITE UR-MCNC: NEGATIVE — SIGNIFICANT CHANGE UP
NITRITE UR-MCNC: NEGATIVE — SIGNIFICANT CHANGE UP
NT-PROBNP SERPL-SCNC: 4063 PG/ML — HIGH (ref 0–450)
NT-PROBNP SERPL-SCNC: 4063 PG/ML — HIGH (ref 0–450)
PCO2 BLDV: 42 MMHG — SIGNIFICANT CHANGE UP (ref 39–42)
PCO2 BLDV: 42 MMHG — SIGNIFICANT CHANGE UP (ref 39–42)
PH BLDV: 7.4 — SIGNIFICANT CHANGE UP (ref 7.32–7.43)
PH BLDV: 7.4 — SIGNIFICANT CHANGE UP (ref 7.32–7.43)
PH UR: 6.5 — SIGNIFICANT CHANGE UP (ref 5–8)
PH UR: 6.5 — SIGNIFICANT CHANGE UP (ref 5–8)
PLAT MORPH BLD: NORMAL — SIGNIFICANT CHANGE UP
PLAT MORPH BLD: NORMAL — SIGNIFICANT CHANGE UP
PLATELET # BLD AUTO: 108 K/UL — LOW (ref 150–400)
PLATELET # BLD AUTO: 108 K/UL — LOW (ref 150–400)
PO2 BLDV: 93 MMHG — HIGH (ref 25–45)
PO2 BLDV: 93 MMHG — HIGH (ref 25–45)
POTASSIUM SERPL-MCNC: 3.8 MMOL/L — SIGNIFICANT CHANGE UP (ref 3.5–5.3)
POTASSIUM SERPL-MCNC: 3.8 MMOL/L — SIGNIFICANT CHANGE UP (ref 3.5–5.3)
POTASSIUM SERPL-SCNC: 3.8 MMOL/L — SIGNIFICANT CHANGE UP (ref 3.5–5.3)
POTASSIUM SERPL-SCNC: 3.8 MMOL/L — SIGNIFICANT CHANGE UP (ref 3.5–5.3)
PROT SERPL-MCNC: 7.2 GM/DL — SIGNIFICANT CHANGE UP (ref 6–8.3)
PROT SERPL-MCNC: 7.2 GM/DL — SIGNIFICANT CHANGE UP (ref 6–8.3)
PROT UR-MCNC: NEGATIVE MG/DL — SIGNIFICANT CHANGE UP
PROT UR-MCNC: NEGATIVE MG/DL — SIGNIFICANT CHANGE UP
PROTHROM AB SERPL-ACNC: 14 SEC — HIGH (ref 9.5–13)
PROTHROM AB SERPL-ACNC: 14 SEC — HIGH (ref 9.5–13)
RAPID RVP RESULT: DETECTED
RAPID RVP RESULT: DETECTED
RBC # BLD: 4.09 M/UL — SIGNIFICANT CHANGE UP (ref 3.8–5.2)
RBC # BLD: 4.09 M/UL — SIGNIFICANT CHANGE UP (ref 3.8–5.2)
RBC # FLD: 13.8 % — SIGNIFICANT CHANGE UP (ref 10.3–14.5)
RBC # FLD: 13.8 % — SIGNIFICANT CHANGE UP (ref 10.3–14.5)
RBC BLD AUTO: NORMAL — SIGNIFICANT CHANGE UP
RBC BLD AUTO: NORMAL — SIGNIFICANT CHANGE UP
RV+EV RNA SPEC QL NAA+PROBE: DETECTED
RV+EV RNA SPEC QL NAA+PROBE: DETECTED
SAO2 % BLDV: 98 % — HIGH (ref 67–88)
SAO2 % BLDV: 98 % — HIGH (ref 67–88)
SARS-COV-2 RNA SPEC QL NAA+PROBE: SIGNIFICANT CHANGE UP
SARS-COV-2 RNA SPEC QL NAA+PROBE: SIGNIFICANT CHANGE UP
SODIUM SERPL-SCNC: 124 MMOL/L — LOW (ref 135–145)
SODIUM SERPL-SCNC: 124 MMOL/L — LOW (ref 135–145)
SP GR SPEC: 1.01 — SIGNIFICANT CHANGE UP (ref 1–1.03)
SP GR SPEC: 1.01 — SIGNIFICANT CHANGE UP (ref 1–1.03)
TROPONIN I, HIGH SENSITIVITY RESULT: 10.85 NG/L — SIGNIFICANT CHANGE UP
TROPONIN I, HIGH SENSITIVITY RESULT: 10.85 NG/L — SIGNIFICANT CHANGE UP
UROBILINOGEN FLD QL: 0.2 MG/DL — SIGNIFICANT CHANGE UP (ref 0.2–1)
UROBILINOGEN FLD QL: 0.2 MG/DL — SIGNIFICANT CHANGE UP (ref 0.2–1)
WBC # BLD: 3.21 K/UL — LOW (ref 3.8–10.5)
WBC # BLD: 3.21 K/UL — LOW (ref 3.8–10.5)
WBC # FLD AUTO: 3.21 K/UL — LOW (ref 3.8–10.5)
WBC # FLD AUTO: 3.21 K/UL — LOW (ref 3.8–10.5)

## 2023-11-07 PROCEDURE — 86140 C-REACTIVE PROTEIN: CPT

## 2023-11-07 PROCEDURE — 84145 PROCALCITONIN (PCT): CPT

## 2023-11-07 PROCEDURE — 99291 CRITICAL CARE FIRST HOUR: CPT

## 2023-11-07 PROCEDURE — 94640 AIRWAY INHALATION TREATMENT: CPT

## 2023-11-07 PROCEDURE — 82140 ASSAY OF AMMONIA: CPT

## 2023-11-07 PROCEDURE — 71045 X-RAY EXAM CHEST 1 VIEW: CPT | Mod: 26

## 2023-11-07 PROCEDURE — 97163 PT EVAL HIGH COMPLEX 45 MIN: CPT | Mod: GP

## 2023-11-07 PROCEDURE — 85025 COMPLETE CBC W/AUTO DIFF WBC: CPT

## 2023-11-07 PROCEDURE — 36415 COLL VENOUS BLD VENIPUNCTURE: CPT

## 2023-11-07 PROCEDURE — 80053 COMPREHEN METABOLIC PANEL: CPT

## 2023-11-07 PROCEDURE — 82306 VITAMIN D 25 HYDROXY: CPT

## 2023-11-07 PROCEDURE — 85652 RBC SED RATE AUTOMATED: CPT

## 2023-11-07 PROCEDURE — 83880 ASSAY OF NATRIURETIC PEPTIDE: CPT

## 2023-11-07 PROCEDURE — 93010 ELECTROCARDIOGRAM REPORT: CPT

## 2023-11-07 PROCEDURE — 83735 ASSAY OF MAGNESIUM: CPT

## 2023-11-07 PROCEDURE — 97116 GAIT TRAINING THERAPY: CPT | Mod: GP

## 2023-11-07 PROCEDURE — 85027 COMPLETE CBC AUTOMATED: CPT

## 2023-11-07 PROCEDURE — 82746 ASSAY OF FOLIC ACID SERUM: CPT

## 2023-11-07 PROCEDURE — 92610 EVALUATE SWALLOWING FUNCTION: CPT | Mod: GN

## 2023-11-07 PROCEDURE — 80048 BASIC METABOLIC PNL TOTAL CA: CPT

## 2023-11-07 PROCEDURE — 71250 CT THORAX DX C-: CPT | Mod: 26,MA

## 2023-11-07 PROCEDURE — 74019 RADEX ABDOMEN 2 VIEWS: CPT

## 2023-11-07 PROCEDURE — 84443 ASSAY THYROID STIM HORMONE: CPT

## 2023-11-07 PROCEDURE — 82607 VITAMIN B-12: CPT

## 2023-11-07 PROCEDURE — 71045 X-RAY EXAM CHEST 1 VIEW: CPT

## 2023-11-07 PROCEDURE — 84100 ASSAY OF PHOSPHORUS: CPT

## 2023-11-07 RX ORDER — IPRATROPIUM/ALBUTEROL SULFATE 18-103MCG
3 AEROSOL WITH ADAPTER (GRAM) INHALATION ONCE
Refills: 0 | Status: COMPLETED | OUTPATIENT
Start: 2023-11-07 | End: 2023-11-07

## 2023-11-07 RX ORDER — SODIUM CHLORIDE 9 MG/ML
3 INJECTION INTRAMUSCULAR; INTRAVENOUS; SUBCUTANEOUS ONCE
Refills: 0 | Status: COMPLETED | OUTPATIENT
Start: 2023-11-07 | End: 2023-11-07

## 2023-11-07 RX ORDER — FUROSEMIDE 40 MG
20 TABLET ORAL ONCE
Refills: 0 | Status: COMPLETED | OUTPATIENT
Start: 2023-11-07 | End: 2023-11-07

## 2023-11-07 RX ADMIN — Medication 20 MILLIGRAM(S): at 20:23

## 2023-11-07 RX ADMIN — Medication 3 MILLILITER(S): at 20:23

## 2023-11-07 RX ADMIN — SODIUM CHLORIDE 3 MILLILITER(S): 9 INJECTION INTRAMUSCULAR; INTRAVENOUS; SUBCUTANEOUS at 18:47

## 2023-11-07 NOTE — ED PROVIDER NOTE - CRITICAL CARE ATTENDING CONTRIBUTION TO CARE
Critical care time spent evaluating and reevaluating pt, ordering and interpreting diagnostics, ordering therapeutics, speaking to admitting MD reviewing old records and documenting. Jorge Luis DUNLAP

## 2023-11-07 NOTE — ED ADULT NURSE REASSESSMENT NOTE - NS ED NURSE REASSESS COMMENT FT1
Received report from outgoing RN. Pt laying in bed and in no distress. Await urine which pt said she does not need to urinate now. Pt confused and asking for her niece. Will continue to monitor.

## 2023-11-07 NOTE — ED ADULT NURSE NOTE - OBJECTIVE STATEMENT
presents to ed from nursing facility for bilateral wheezing. patient received albuterol pta. patient confused at baseline. afebrile.

## 2023-11-07 NOTE — ED PROVIDER NOTE - CLINICAL SUMMARY MEDICAL DECISION MAKING FREE TEXT BOX
87 y/o F with a PMhx of hip fx s/p repair, pulmonary HTN, GERD, HTN, afib, migraine, breast cyst, and ovarian cyst here with cough labs, respiratory tx, and probable admission.

## 2023-11-07 NOTE — ED PROVIDER NOTE - PROGRESS NOTE DETAILS
pt still with cough rhonchi and wheezing. chest ct neg for infil pos enterorhino, high bnp. Will give steroids and admit. Jorge Luis DUNLAP Pt with hx pulmonary htn. may be cause of high bnp as chest ct is neg for vasc congestin. Jorge Luis DUNLAP

## 2023-11-07 NOTE — ED PROVIDER NOTE - CARE PLAN
1 Principal Discharge DX:	Hyponatremia  Secondary Diagnosis:	Infection, enterovirus  Secondary Diagnosis:	Bronchitis with acute wheezing  Secondary Diagnosis:	Pulmonary HTN

## 2023-11-07 NOTE — ED PROVIDER NOTE - OBJECTIVE STATEMENT
87 y/o F with a PMhx of hip fx s/p repair, pulmonary HTN, GERD, HTN, afib, migraine, breast cyst, and ovarian cyst presents to the ED c/o cough. Denies feeling unwell. 89 y/o F resident of [place],with a PMhx of hip fx s/p repair, pulmonary HTN, GERD, HTN, afib, migraine, breast cyst, and ovarian cyst presents to the ED c/o cough. Denies feeling unwell. Pt is poor historian.

## 2023-11-07 NOTE — ED ADULT NURSE NOTE - CHIEF COMPLAINT QUOTE
Patient presents to the ER from [facility] with complaints of shortness of breath after screaming per EMS. Patient received albuterol for wheezing at facility. Patient confused at baseline.

## 2023-11-07 NOTE — ED ADULT NURSE NOTE - NSFALLHARMRISKINTERV_ED_ALL_ED

## 2023-11-08 DIAGNOSIS — E87.1 HYPO-OSMOLALITY AND HYPONATREMIA: ICD-10-CM

## 2023-11-08 LAB
ANION GAP SERPL CALC-SCNC: 8 MMOL/L — SIGNIFICANT CHANGE UP (ref 5–17)
ANION GAP SERPL CALC-SCNC: 8 MMOL/L — SIGNIFICANT CHANGE UP (ref 5–17)
BUN SERPL-MCNC: 13 MG/DL — SIGNIFICANT CHANGE UP (ref 7–23)
BUN SERPL-MCNC: 13 MG/DL — SIGNIFICANT CHANGE UP (ref 7–23)
CALCIUM SERPL-MCNC: 9.1 MG/DL — SIGNIFICANT CHANGE UP (ref 8.5–10.1)
CALCIUM SERPL-MCNC: 9.1 MG/DL — SIGNIFICANT CHANGE UP (ref 8.5–10.1)
CHLORIDE SERPL-SCNC: 91 MMOL/L — LOW (ref 96–108)
CHLORIDE SERPL-SCNC: 91 MMOL/L — LOW (ref 96–108)
CO2 SERPL-SCNC: 28 MMOL/L — SIGNIFICANT CHANGE UP (ref 22–31)
CO2 SERPL-SCNC: 28 MMOL/L — SIGNIFICANT CHANGE UP (ref 22–31)
CREAT SERPL-MCNC: 0.65 MG/DL — SIGNIFICANT CHANGE UP (ref 0.5–1.3)
CREAT SERPL-MCNC: 0.65 MG/DL — SIGNIFICANT CHANGE UP (ref 0.5–1.3)
EGFR: 86 ML/MIN/1.73M2 — SIGNIFICANT CHANGE UP
EGFR: 86 ML/MIN/1.73M2 — SIGNIFICANT CHANGE UP
GLUCOSE SERPL-MCNC: 124 MG/DL — HIGH (ref 70–99)
GLUCOSE SERPL-MCNC: 124 MG/DL — HIGH (ref 70–99)
POTASSIUM SERPL-MCNC: 3.2 MMOL/L — LOW (ref 3.5–5.3)
POTASSIUM SERPL-MCNC: 3.2 MMOL/L — LOW (ref 3.5–5.3)
POTASSIUM SERPL-SCNC: 3.2 MMOL/L — LOW (ref 3.5–5.3)
POTASSIUM SERPL-SCNC: 3.2 MMOL/L — LOW (ref 3.5–5.3)
SODIUM SERPL-SCNC: 127 MMOL/L — LOW (ref 135–145)
SODIUM SERPL-SCNC: 127 MMOL/L — LOW (ref 135–145)

## 2023-11-08 PROCEDURE — 99222 1ST HOSP IP/OBS MODERATE 55: CPT

## 2023-11-08 PROCEDURE — 99497 ADVNCD CARE PLAN 30 MIN: CPT | Mod: 25

## 2023-11-08 RX ORDER — QUETIAPINE FUMARATE 200 MG/1
12.5 TABLET, FILM COATED ORAL EVERY 8 HOURS
Refills: 0 | Status: DISCONTINUED | OUTPATIENT
Start: 2023-11-08 | End: 2023-11-14

## 2023-11-08 RX ORDER — BUDESONIDE AND FORMOTEROL FUMARATE DIHYDRATE 160; 4.5 UG/1; UG/1
2 AEROSOL RESPIRATORY (INHALATION)
Refills: 0 | Status: DISCONTINUED | OUTPATIENT
Start: 2023-11-08 | End: 2023-11-14

## 2023-11-08 RX ORDER — LANOLIN ALCOHOL/MO/W.PET/CERES
3 CREAM (GRAM) TOPICAL AT BEDTIME
Refills: 0 | Status: DISCONTINUED | OUTPATIENT
Start: 2023-11-08 | End: 2023-11-14

## 2023-11-08 RX ORDER — CEFUROXIME AXETIL 250 MG
1 TABLET ORAL
Qty: 0 | Refills: 0 | DISCHARGE
End: 2023-04-11

## 2023-11-08 RX ORDER — HALOPERIDOL DECANOATE 100 MG/ML
1 INJECTION INTRAMUSCULAR EVERY 12 HOURS
Refills: 0 | Status: DISCONTINUED | OUTPATIENT
Start: 2023-11-08 | End: 2023-11-12

## 2023-11-08 RX ORDER — ALBUTEROL 90 UG/1
2 AEROSOL, METERED ORAL EVERY 6 HOURS
Refills: 0 | Status: DISCONTINUED | OUTPATIENT
Start: 2023-11-08 | End: 2023-11-14

## 2023-11-08 RX ORDER — ASPIRIN/CALCIUM CARB/MAGNESIUM 324 MG
1 TABLET ORAL
Refills: 0 | DISCHARGE

## 2023-11-08 RX ORDER — HALOPERIDOL DECANOATE 100 MG/ML
1 INJECTION INTRAMUSCULAR ONCE
Refills: 0 | Status: COMPLETED | OUTPATIENT
Start: 2023-11-08 | End: 2023-11-08

## 2023-11-08 RX ORDER — LOSARTAN POTASSIUM 100 MG/1
100 TABLET, FILM COATED ORAL DAILY
Refills: 0 | Status: DISCONTINUED | OUTPATIENT
Start: 2023-11-08 | End: 2023-11-14

## 2023-11-08 RX ORDER — HYDRALAZINE HCL 50 MG
10 TABLET ORAL EVERY 4 HOURS
Refills: 0 | Status: DISCONTINUED | OUTPATIENT
Start: 2023-11-08 | End: 2023-11-08

## 2023-11-08 RX ORDER — METOPROLOL TARTRATE 50 MG
25 TABLET ORAL DAILY
Refills: 0 | Status: DISCONTINUED | OUTPATIENT
Start: 2023-11-08 | End: 2023-11-14

## 2023-11-08 RX ORDER — DIVALPROEX SODIUM 500 MG/1
125 TABLET, DELAYED RELEASE ORAL
Refills: 0 | Status: DISCONTINUED | OUTPATIENT
Start: 2023-11-08 | End: 2023-11-14

## 2023-11-08 RX ORDER — ACETAMINOPHEN 500 MG
2 TABLET ORAL
Refills: 0 | DISCHARGE

## 2023-11-08 RX ORDER — POTASSIUM CHLORIDE 20 MEQ
40 PACKET (EA) ORAL EVERY 4 HOURS
Refills: 0 | Status: COMPLETED | OUTPATIENT
Start: 2023-11-08 | End: 2023-11-08

## 2023-11-08 RX ORDER — ONDANSETRON 8 MG/1
4 TABLET, FILM COATED ORAL EVERY 8 HOURS
Refills: 0 | Status: DISCONTINUED | OUTPATIENT
Start: 2023-11-08 | End: 2023-11-14

## 2023-11-08 RX ORDER — IPRATROPIUM/ALBUTEROL SULFATE 18-103MCG
3 AEROSOL WITH ADAPTER (GRAM) INHALATION EVERY 6 HOURS
Refills: 0 | Status: DISCONTINUED | OUTPATIENT
Start: 2023-11-08 | End: 2023-11-14

## 2023-11-08 RX ORDER — ASPIRIN/CALCIUM CARB/MAGNESIUM 324 MG
81 TABLET ORAL DAILY
Refills: 0 | Status: DISCONTINUED | OUTPATIENT
Start: 2023-11-08 | End: 2023-11-14

## 2023-11-08 RX ORDER — DIVALPROEX SODIUM 500 MG/1
1 TABLET, DELAYED RELEASE ORAL
Refills: 0 | DISCHARGE

## 2023-11-08 RX ORDER — PANTOPRAZOLE SODIUM 20 MG/1
40 TABLET, DELAYED RELEASE ORAL
Refills: 0 | Status: DISCONTINUED | OUTPATIENT
Start: 2023-11-08 | End: 2023-11-14

## 2023-11-08 RX ORDER — ACETAMINOPHEN 500 MG
650 TABLET ORAL EVERY 6 HOURS
Refills: 0 | Status: DISCONTINUED | OUTPATIENT
Start: 2023-11-08 | End: 2023-11-14

## 2023-11-08 RX ADMIN — Medication 80 MILLIGRAM(S): at 02:09

## 2023-11-08 RX ADMIN — Medication 3 MILLILITER(S): at 16:58

## 2023-11-08 RX ADMIN — Medication 3 MILLILITER(S): at 20:14

## 2023-11-08 RX ADMIN — Medication 25 MILLIGRAM(S): at 09:05

## 2023-11-08 RX ADMIN — HALOPERIDOL DECANOATE 1 MILLIGRAM(S): 100 INJECTION INTRAMUSCULAR at 12:36

## 2023-11-08 RX ADMIN — Medication 81 MILLIGRAM(S): at 09:06

## 2023-11-08 RX ADMIN — DIVALPROEX SODIUM 125 MILLIGRAM(S): 500 TABLET, DELAYED RELEASE ORAL at 11:26

## 2023-11-08 RX ADMIN — Medication 20 MILLIGRAM(S): at 23:20

## 2023-11-08 RX ADMIN — LOSARTAN POTASSIUM 100 MILLIGRAM(S): 100 TABLET, FILM COATED ORAL at 09:05

## 2023-11-08 RX ADMIN — Medication 40 MILLIEQUIVALENT(S): at 11:26

## 2023-11-08 NOTE — CONSULT NOTE ADULT - ASSESSMENT
PROBLEMS:    Dyspnea 2ndry to [employer]/ Asthmatic Bronchitis-CT chest No evidence of PNA or CHF  Chronic A fib, severe MR  Severe pulmonary HTN   Depression and dementia (AAOx2)    PLAN:    IV Solumedrol 20mg IV Q8H  ALbuterol/symbicort  S/P IV lasix-not in fluid overload-hold  Further diuretics  Rate controlled AFIB-continue metoprolol and ASA-continue home meds  Fall precautions   Aspiration Precautions  VTE-SCDS

## 2023-11-08 NOTE — H&P ADULT - ASSESSMENT
#Dyspnea 2ndry to [employer]   -Admit to medical floors  -CT chest No evidence of PNA or CHF   -solumedrol 20mg IV Q8H  -ALbuterol, symbicort  -Pulm consult    #Hyponatremia  -FU BMP STAT  -S/P IV lasix in the ER, patient at this time is not in fluid overload - hold for now     #chronic A fib, severe MR, severe pulmonary HTN   -rate controlled AFIB   -continue metoprolol and ASA    #Depression and dementia (AAOx2)  -continue home meds  -Fall precautions   -Aspiration Precautions    #VTE   -SCDS #Dyspnea 2ndry to [employer]   -Admit to medical floors  -CT chest No evidence of PNA or CHF   -solumedrol 20mg IV Q8H  -ALbuterol, symbicort  -Pulm consult    #Hyponatremia  -FU BMP STAT  -S/P IV lasix in the ER, patient at this time is not in fluid overload - hold for now   -Encourage PO Intake   -Monitor NA levels     #chronic A fib, severe MR, severe pulmonary HTN   -rate controlled AFIB   -continue metoprolol and ASA    #Depression and dementia (AAOx2)  -continue home meds  -Fall precautions   -Aspiration Precautions    #VTE   -SCDS    #DNR/DNI, No FT    #Dispo - patients  Laz Barajas [phone] is at [facility] and has Parkinsons Disease; he has given me permission to update his niece now and going forward on her clinical status Bernadette Interiano [phone]

## 2023-11-08 NOTE — H&P ADULT - HISTORY OF PRESENT ILLNESS
88 y/o F with PMH A fib, dementia (A+Ox1 to 2), HTN, GERD, right hip fracture s/p closed reduction and percutaneous pinning of right hip on 3/29/23, severe MR, severe pulmonary HTN, 4+ TR  was sent from [facility] with cough and Dyspnea. patient Denies any CHest pain , plapitations, pain, N/V/D. No the best historian. MOst of the history was obtained from the chart. Discussed plan with patient Niece and .    In the ER patient received IV solumedrol and Albuterol.    Social History: lives at [facility] no tobacco, alcohol or illicit drug use   Family History: unknown age and cause of death of both parents due to dementia

## 2023-11-08 NOTE — H&P ADULT - CONVERSATION DETAILS
Advanced Care Planning 47 minutes.  Discussion with , Laz Barajas [phone] and niece  regarding advance directives. We discussed diagnosis, prognosis and goals of care. At this time they would like her to be DNR/DNI, no feeding tube. But would like to continue with IVF and IV ABX , Advanced Care Planning 47 minutes.  Discussion with , Laz Barajas [phone] and niece  regarding advance directives. We discussed diagnosis, prognosis and goals of care. At this time they would like her to be DNR/DNI, no feeding tube. But would like to continue with IVF and IV ABX , patients  Laz Barajas [phone] is at [facility] and has Parkinsons Disease; he has given me permission to update his niece now and going forward on her clinical status Bernadette Interiano [phone]

## 2023-11-08 NOTE — H&P ADULT - NSHPPHYSICALEXAM_GEN_ALL_CORE
PHYSICAL EXAM:  Constitutional: NAD, awake and alert  Neurological: AAO x 1, no focal deficits  HEENT: PERRLA, EOMI, MMM  Neck: Soft and supple, No LAD, No JVD  Respiratory: Breath sounds are clear bilaterally, No wheezing, rales or rhonchi  Cardiovascular: S1 and S2, regular rate and rhythm; no Murmurs, gallops or rubs  Gastrointestinal: Bowel Sounds present, soft, nontender, nondistended, no guarding, no rebound tenderness  Back: No CVA tenderness   Extremities: + peripheral edema  Vascular: 2+ peripheral pulses  Musculoskeletal: 5/5 strength b/l upper and lower extremities  Skin: No rashes  Breast: Deferred  Rectal: Deferred

## 2023-11-08 NOTE — H&P ADULT - NSHPLABSRESULTS_GEN_ALL_CORE
Lab Results:  CBC  CBC Full  -  ( 2023 16:20 )  WBC Count : 3.21 K/uL  RBC Count : 4.09 M/uL  Hemoglobin : 12.6 g/dL  Hematocrit : 37.1 %  Platelet Count - Automated : 108 K/uL  Mean Cell Volume : 90.7 fl  Mean Cell Hemoglobin : 30.8 pg  Mean Cell Hemoglobin Concentration : 34.0 gm/dL  Auto Neutrophil # : 1.70 K/uL  Auto Lymphocyte # : 0.75 K/uL  Auto Monocyte # : 0.70 K/uL  Auto Eosinophil # : 0.03 K/uL  Auto Basophil # : 0.02 K/uL  Auto Neutrophil % : 53.0 %  Auto Lymphocyte % : 23.4 %  Auto Monocyte % : 21.8 %  Auto Eosinophil % : 0.9 %  Auto Basophil % : 0.6 %    .		Differential:	[] Automated		[] Manual  Chemistry                        12.6   3.21  )-----------( 108      ( 2023 16:20 )             37.1     11-07    124<L>  |  91<L>  |  16  ----------------------------<  80  3.8   |  24  |  0.65    Ca    8.9      2023 16:20    TPro  7.2  /  Alb  3.5  /  TBili  1.3<H>  /  DBili  x   /  AST  22  /  ALT  16  /  AlkPhos  92  11-07    LIVER FUNCTIONS - ( 2023 16:20 )  Alb: 3.5 g/dL / Pro: 7.2 gm/dL / ALK PHOS: 92 U/L / ALT: 16 U/L / AST: 22 U/L / GGT: x           PT/INR - ( 2023 16:20 )   PT: 14.0 sec;   INR: 1.25 ratio         PTT - ( 2023 16:20 )  PTT:34.6 sec  Urinalysis Basic - ( 2023 21:40 )    Color: Yellow / Appearance: Clear / S.010 / pH: x  Gluc: x / Ketone: Trace mg/dL  / Bili: Negative / Urobili: 0.2 mg/dL   Blood: x / Protein: Negative mg/dL / Nitrite: Negative   Leuk Esterase: Negative / RBC: x / WBC x   Sq Epi: x / Non Sq Epi: x / Bacteria: x      RADIOLOGY RESULTS:    EKG AFIB Rate controlled     < from: CT Chest No Cont (23 @ 22:03) >      IMPRESSION:  Previously seen scattered groundglass opacities are not present on the   prior study. No areas of new parenchymal consolidation. Bilateral   dependentatelectasis.      < end of copied text >

## 2023-11-08 NOTE — CONSULT NOTE ADULT - SUBJECTIVE AND OBJECTIVE BOX
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 patient Denies any CHest pain , plapitations, pain, N/V/D. No the best historian. MOst of the history was obtained from the chart. In the ER patient received IV solumedrol and Albuterol. pat seen for pulmonary eval.    Social History: lives at [place] no tobacco, alcohol or illicit drug use   Family History: unknown age and cause of death of both parents due to dementia        (08 Nov 2023 09:32)      PAST MEDICAL & SURGICAL HISTORY:  Migraine      Atrial Fibrillation      Hypertension      GERD (Gastroesophageal Reflux Disease)      Pulmonary hypertension      Hip fracture      History of repair of hip fracture      History of Cataract Surgery  right 2006      Ovarian Cyst      Breast Cyst      History of fracture of right hip          Home Medications:  acetaminophen 325 mg oral tablet: 2 tab(s) orally every 6 hours as needed for  mild pain (08 Nov 2023 00:33)  aspirin 81 mg oral delayed release tablet: 1 tab(s) orally once a day (08 Nov 2023 00:42)  divalproex sodium 125 mg oral delayed release tablet: 1 tab(s) orally 2 times a day (08 Nov 2023 00:42)  losartan 100 mg oral tablet: 1 tab(s) orally once a day (08 Nov 2023 00:33)  metoprolol succinate 25 mg oral tablet, extended release: 1 tab(s) orally once a day (08 Nov 2023 00:33)      MEDICATIONS  (STANDING):  albuterol/ipratropium for Nebulization 3 milliLiter(s) Nebulizer every 6 hours  aspirin enteric coated 81 milliGRAM(s) Oral daily  budesonide 160 MICROgram(s)/formoterol 4.5 MICROgram(s) Inhaler 2 Puff(s) Inhalation two times a day  divalproex  milliGRAM(s) Oral two times a day  losartan 100 milliGRAM(s) Oral daily  methylPREDNISolone sodium succinate Injectable 20 milliGRAM(s) IV Push every 8 hours  metoprolol succinate ER 25 milliGRAM(s) Oral daily  pantoprazole    Tablet 40 milliGRAM(s) Oral before breakfast  potassium chloride   Powder 40 milliEquivalent(s) Oral every 4 hours    MEDICATIONS  (PRN):  acetaminophen     Tablet .. 650 milliGRAM(s) Oral every 6 hours PRN Temp greater or equal to 38C (100.4F), Mild Pain (1 - 3)  albuterol    90 MICROgram(s) HFA Inhaler 2 Puff(s) Inhalation every 6 hours PRN Shortness of Breath  aluminum hydroxide/magnesium hydroxide/simethicone Suspension 30 milliLiter(s) Oral every 4 hours PRN Dyspepsia  haloperidol    Injectable 1 milliGRAM(s) IntraMuscular every 12 hours PRN Agitation  melatonin 3 milliGRAM(s) Oral at bedtime PRN Insomnia  ondansetron Injectable 4 milliGRAM(s) IV Push every 8 hours PRN Nausea and/or Vomiting  QUEtiapine 12.5 milliGRAM(s) Oral every 8 hours PRN agitation      Allergies    penicillin (Anaphylaxis)  Zithromax (Hives)    Intolerances        SOCIAL HISTORY: Denies tobacco, etoh abuse or illicit drug use    FAMILY HISTORY:  FH: Alzheimers disease (Father)        Vital Signs Last 24 Hrs  T(C): 36.5 (08 Nov 2023 08:15), Max: 37.1 (07 Nov 2023 17:47)  T(F): 97.7 (08 Nov 2023 08:15), Max: 98.7 (07 Nov 2023 17:47)  HR: 90 (08 Nov 2023 08:15) (68 - 90)  BP: 160/86 (08 Nov 2023 08:15) (133/- - 160/86)  BP(mean): 96 (08 Nov 2023 02:31) (96 - 97)  RR: 16 (08 Nov 2023 08:15) (15 - 20)  SpO2: 100% (08 Nov 2023 08:15) (96% - 100%)    Parameters below as of 08 Nov 2023 08:15  Patient On (Oxygen Delivery Method): nasal cannula  O2 Flow (L/min): 2          REVIEW OF SYSTEMS:    CONSTITUTIONAL:  As per HPI.  HEENT:  Eyes:  No diplopia or blurred vision. ENT:  No earache, sore throat or runny nose.  CARDIOVASCULAR:  No pressure, squeezing, tightness, heaviness or aching about the chest, neck, axilla or epigastrium.  RESPIRATORY:  No cough, shortness of breath, PND or orthopnea.  GASTROINTESTINAL:  No nausea, vomiting or diarrhea.  GENITOURINARY:  No dysuria, frequency or urgency.  MUSCULOSKELETAL:  As per HPI.  SKIN:  No change in skin, hair or nails.  NEUROLOGIC:  No paresthesias, fasciculations, seizures or weakness.  PSYCHIATRIC:  No disorder of thought or mood.  ENDOCRINE:  No heat or cold intolerance, polyuria or polydipsia.  HEMATOLOGICAL:  No easy bruising or bleedings:  .     PHYSICAL EXAMINATION:    GENERAL APPEARANCE:  Pt. is not currently dyspneic, in no distress. Pt. is alert, oriented, and pleasant.  HEENT:  Pupils are normal and react normally. No icterus. Mucous membranes well colored.  NECK:  Supple. No lymphadenopathy. Jugular venous pressure not elevated. Carotids equal.   HEART:   The cardiac impulse has a normal quality. Regular. Normal S1 and S2. There are no murmurs, rubs or gallops noted  CHEST:  Chest is clear to auscultation. Normal respiratory effort.  ABDOMEN:  Soft and nontender.   EXTREMITIES:  There is no cyanosis, clubbing or edema.   SKIN:  No rash or significant lesions are noted.    LABS:                        12.6   3.21  )-----------( 108      ( 07 Nov 2023 16:20 )             37.1     11-08    127<L>  |  91<L>  |  13  ----------------------------<  124<H>  3.2<L>   |  28  |  0.65    Ca    9.1      08 Nov 2023 09:45    TPro  7.2  /  Alb  3.5  /  TBili  1.3<H>  /  DBili  x   /  AST  22  /  ALT  16  /  AlkPhos  92  11-07    LIVER FUNCTIONS - ( 07 Nov 2023 16:20 )  Alb: 3.5 g/dL / Pro: 7.2 gm/dL / ALK PHOS: 92 U/L / ALT: 16 U/L / AST: 22 U/L / GGT: x           PT/INR - ( 07 Nov 2023 16:20 )   PT: 14.0 sec;   INR: 1.25 ratio         PTT - ( 07 Nov 2023 16:20 )  PTT:34.6 sec      Urinalysis Basic - ( 08 Nov 2023 09:45 )    Color: x / Appearance: x / SG: x / pH: x  Gluc: 124 mg/dL / Ketone: x  / Bili: x / Urobili: x   Blood: x / Protein: x / Nitrite: x   Leuk Esterase: x / RBC: x / WBC x   Sq Epi: x / Non Sq Epi: x / Bacteria: x            RADIOLOGY & ADDITIONAL STUDIES:     CT Chest No Cont (11.07.23 @ 22:03) >  IMPRESSION:  Previously seen scattered groundglass opacities are not present on the   prior study. No areas of new parenchymal consolidation. Bilateral   dependentatelectasis.           HPI:    87 y.o.f with PMH A fib, dementia (A+Ox1 to 2), HTN, GERD, right hip fracture s/p closed reduction and percutaneous pinning of right hip on 3/29/23, severe MR, severe pulmonary HTN, 4+ TR  was sent from [facility] with cough and Dyspnea. patient Denies any CHest pain , plapitations, pain, N/V/D. No the best historian. MOst of the history was obtained from the chart. In the ER patient received IV solumedrol and Albuterol. pat seen for pulmonary eval. pat lying in bed, no respiratory distress.    Social History: lives at AL no tobacco, alcohol or illicit drug use   Family History: unknown age and cause of death of both parents due to dementia       PAST MEDICAL & SURGICAL HISTORY:  Migraine      Atrial Fibrillation      Hypertension      GERD (Gastroesophageal Reflux Disease)      Pulmonary hypertension      Hip fracture      History of repair of hip fracture      History of Cataract Surgery  right 2006      Ovarian Cyst      Breast Cyst      History of fracture of right hip          Home Medications:  acetaminophen 325 mg oral tablet: 2 tab(s) orally every 6 hours as needed for  mild pain (08 Nov 2023 00:33)  aspirin 81 mg oral delayed release tablet: 1 tab(s) orally once a day (08 Nov 2023 00:42)  divalproex sodium 125 mg oral delayed release tablet: 1 tab(s) orally 2 times a day (08 Nov 2023 00:42)  losartan 100 mg oral tablet: 1 tab(s) orally once a day (08 Nov 2023 00:33)  metoprolol succinate 25 mg oral tablet, extended release: 1 tab(s) orally once a day (08 Nov 2023 00:33)      MEDICATIONS  (STANDING):  albuterol/ipratropium for Nebulization 3 milliLiter(s) Nebulizer every 6 hours  aspirin enteric coated 81 milliGRAM(s) Oral daily  budesonide 160 MICROgram(s)/formoterol 4.5 MICROgram(s) Inhaler 2 Puff(s) Inhalation two times a day  divalproex  milliGRAM(s) Oral two times a day  losartan 100 milliGRAM(s) Oral daily  methylPREDNISolone sodium succinate Injectable 20 milliGRAM(s) IV Push every 8 hours  metoprolol succinate ER 25 milliGRAM(s) Oral daily  pantoprazole    Tablet 40 milliGRAM(s) Oral before breakfast  potassium chloride   Powder 40 milliEquivalent(s) Oral every 4 hours    MEDICATIONS  (PRN):  acetaminophen     Tablet .. 650 milliGRAM(s) Oral every 6 hours PRN Temp greater or equal to 38C (100.4F), Mild Pain (1 - 3)  albuterol    90 MICROgram(s) HFA Inhaler 2 Puff(s) Inhalation every 6 hours PRN Shortness of Breath  aluminum hydroxide/magnesium hydroxide/simethicone Suspension 30 milliLiter(s) Oral every 4 hours PRN Dyspepsia  haloperidol    Injectable 1 milliGRAM(s) IntraMuscular every 12 hours PRN Agitation  melatonin 3 milliGRAM(s) Oral at bedtime PRN Insomnia  ondansetron Injectable 4 milliGRAM(s) IV Push every 8 hours PRN Nausea and/or Vomiting  QUEtiapine 12.5 milliGRAM(s) Oral every 8 hours PRN agitation      Allergies    penicillin (Anaphylaxis)  Zithromax (Hives)    Intolerances        SOCIAL HISTORY: Denies tobacco, etoh abuse or illicit drug use    FAMILY HISTORY:  FH: Alzheimers disease (Father)        Vital Signs Last 24 Hrs  T(C): 36.5 (08 Nov 2023 08:15), Max: 37.1 (07 Nov 2023 17:47)  T(F): 97.7 (08 Nov 2023 08:15), Max: 98.7 (07 Nov 2023 17:47)  HR: 90 (08 Nov 2023 08:15) (68 - 90)  BP: 160/86 (08 Nov 2023 08:15) (133/- - 160/86)  BP(mean): 96 (08 Nov 2023 02:31) (96 - 97)  RR: 16 (08 Nov 2023 08:15) (15 - 20)  SpO2: 100% (08 Nov 2023 08:15) (96% - 100%)    Parameters below as of 08 Nov 2023 08:15  Patient On (Oxygen Delivery Method): nasal cannula  O2 Flow (L/min): 2          REVIEW OF SYSTEMS:    CONSTITUTIONAL:  As per HPI.  HEENT:  Eyes:  No diplopia or blurred vision. ENT:  No earache, sore throat or runny nose.  CARDIOVASCULAR:  No pressure, squeezing, tightness, heaviness or aching about the chest, neck, axilla or epigastrium.  RESPIRATORY:  No cough, shortness of breath, PND or orthopnea.  GASTROINTESTINAL:  No nausea, vomiting or diarrhea.  GENITOURINARY:  No dysuria, frequency or urgency.  MUSCULOSKELETAL:  As per HPI.  SKIN:  No change in skin, hair or nails.  NEUROLOGIC:  No paresthesias, fasciculations, seizures or weakness.  PSYCHIATRIC:  No disorder of thought or mood.  ENDOCRINE:  No heat or cold intolerance, polyuria or polydipsia.  HEMATOLOGICAL:  No easy bruising or bleedings:  .     PHYSICAL EXAMINATION:    GENERAL APPEARANCE:  Pt. is not currently dyspneic, in no distress. Pt. is alert, oriented, and pleasant.  HEENT:  Pupils are normal and react normally. No icterus. Mucous membranes well colored.  NECK:  Supple. No lymphadenopathy. Jugular venous pressure not elevated. Carotids equal.   HEART:   The cardiac impulse has a normal quality. Regular. Normal S1 and S2. There are no murmurs, rubs or gallops noted  CHEST:  Chest is clear to auscultation. Normal respiratory effort.  ABDOMEN:  Soft and nontender.   EXTREMITIES:  There is no cyanosis, clubbing or edema.   SKIN:  No rash or significant lesions are noted.    LABS:                        12.6   3.21  )-----------( 108      ( 07 Nov 2023 16:20 )             37.1     11-08    127<L>  |  91<L>  |  13  ----------------------------<  124<H>  3.2<L>   |  28  |  0.65    Ca    9.1      08 Nov 2023 09:45    TPro  7.2  /  Alb  3.5  /  TBili  1.3<H>  /  DBili  x   /  AST  22  /  ALT  16  /  AlkPhos  92  11-07    LIVER FUNCTIONS - ( 07 Nov 2023 16:20 )  Alb: 3.5 g/dL / Pro: 7.2 gm/dL / ALK PHOS: 92 U/L / ALT: 16 U/L / AST: 22 U/L / GGT: x           PT/INR - ( 07 Nov 2023 16:20 )   PT: 14.0 sec;   INR: 1.25 ratio         PTT - ( 07 Nov 2023 16:20 )  PTT:34.6 sec      Urinalysis Basic - ( 08 Nov 2023 09:45 )    Color: x / Appearance: x / SG: x / pH: x  Gluc: 124 mg/dL / Ketone: x  / Bili: x / Urobili: x   Blood: x / Protein: x / Nitrite: x   Leuk Esterase: x / RBC: x / WBC x   Sq Epi: x / Non Sq Epi: x / Bacteria: x      Rapid RVP Result: Detected (11.07.23 @ 18:45)   Entero/Rhinovirus (RapRVP): Detected (11.07.23 @ 18:45)     pH, Venous: 7.40  pCO2, Venous: 42 mmHg  pO2, Venous: 93 mmHg  HCO3, Venous: 26 mmol/L  Base Excess, Venous: 1.0 mmol/L  Oxygen Saturation, Venous: 98 %      RADIOLOGY & ADDITIONAL STUDIES:     CT Chest No Cont (11.07.23 @ 22:03) >  IMPRESSION:  Previously seen scattered groundglass opacities are not present on the   prior study. No areas of new parenchymal consolidation. Bilateral   dependentatelectasis.

## 2023-11-08 NOTE — PATIENT PROFILE ADULT - FALL HARM RISK - HARM RISK INTERVENTIONS
Assistance with ambulation/Assistance OOB with selected safe patient handling equipment/Communicate Risk of Fall with Harm to all staff/Discuss with provider need for PT consult/Monitor gait and stability/Move patient closer to nurses' station/Reinforce activity limits and safety measures with patient and family/Reorient to person, place and time as needed/Tailored Fall Risk Interventions/Use of alarms - bed, chair and/or voice tab/Visual Cue: Yellow wristband and red socks/Bed in lowest position, wheels locked, appropriate side rails in place/Call bell, personal items and telephone in reach/Instruct patient to call for assistance before getting out of bed or chair/Non-slip footwear when patient is out of bed/[place] to call system/Physically safe environment - no spills, clutter or unnecessary equipment/Purposeful Proactive Rounding/Room/bathroom lighting operational, light cord in reach

## 2023-11-08 NOTE — PHARMACOTHERAPY INTERVENTION NOTE - COMMENTS
Medication reconciliation completed.  Reviewed Medication list and confirmed med allergies with [facility]"; confirmed with Dr. First Goldbergakosua.

## 2023-11-09 LAB
ADD ON TEST-SPECIMEN IN LAB: SIGNIFICANT CHANGE UP
ADD ON TEST-SPECIMEN IN LAB: SIGNIFICANT CHANGE UP
ALBUMIN SERPL ELPH-MCNC: 3.2 G/DL — LOW (ref 3.3–5)
ALBUMIN SERPL ELPH-MCNC: 3.2 G/DL — LOW (ref 3.3–5)
ALP SERPL-CCNC: 79 U/L — SIGNIFICANT CHANGE UP (ref 40–120)
ALP SERPL-CCNC: 79 U/L — SIGNIFICANT CHANGE UP (ref 40–120)
ALT FLD-CCNC: 14 U/L — SIGNIFICANT CHANGE UP (ref 12–78)
ALT FLD-CCNC: 14 U/L — SIGNIFICANT CHANGE UP (ref 12–78)
ANION GAP SERPL CALC-SCNC: 9 MMOL/L — SIGNIFICANT CHANGE UP (ref 5–17)
ANION GAP SERPL CALC-SCNC: 9 MMOL/L — SIGNIFICANT CHANGE UP (ref 5–17)
AST SERPL-CCNC: 13 U/L — LOW (ref 15–37)
AST SERPL-CCNC: 13 U/L — LOW (ref 15–37)
BILIRUB SERPL-MCNC: 0.7 MG/DL — SIGNIFICANT CHANGE UP (ref 0.2–1.2)
BILIRUB SERPL-MCNC: 0.7 MG/DL — SIGNIFICANT CHANGE UP (ref 0.2–1.2)
BUN SERPL-MCNC: 26 MG/DL — HIGH (ref 7–23)
BUN SERPL-MCNC: 26 MG/DL — HIGH (ref 7–23)
CALCIUM SERPL-MCNC: 9.4 MG/DL — SIGNIFICANT CHANGE UP (ref 8.5–10.1)
CALCIUM SERPL-MCNC: 9.4 MG/DL — SIGNIFICANT CHANGE UP (ref 8.5–10.1)
CHLORIDE SERPL-SCNC: 98 MMOL/L — SIGNIFICANT CHANGE UP (ref 96–108)
CHLORIDE SERPL-SCNC: 98 MMOL/L — SIGNIFICANT CHANGE UP (ref 96–108)
CO2 SERPL-SCNC: 24 MMOL/L — SIGNIFICANT CHANGE UP (ref 22–31)
CO2 SERPL-SCNC: 24 MMOL/L — SIGNIFICANT CHANGE UP (ref 22–31)
CREAT SERPL-MCNC: 0.75 MG/DL — SIGNIFICANT CHANGE UP (ref 0.5–1.3)
CREAT SERPL-MCNC: 0.75 MG/DL — SIGNIFICANT CHANGE UP (ref 0.5–1.3)
EGFR: 77 ML/MIN/1.73M2 — SIGNIFICANT CHANGE UP
EGFR: 77 ML/MIN/1.73M2 — SIGNIFICANT CHANGE UP
GLUCOSE SERPL-MCNC: 136 MG/DL — HIGH (ref 70–99)
GLUCOSE SERPL-MCNC: 136 MG/DL — HIGH (ref 70–99)
HCT VFR BLD CALC: 36 % — SIGNIFICANT CHANGE UP (ref 34.5–45)
HCT VFR BLD CALC: 36 % — SIGNIFICANT CHANGE UP (ref 34.5–45)
HGB BLD-MCNC: 12.4 G/DL — SIGNIFICANT CHANGE UP (ref 11.5–15.5)
HGB BLD-MCNC: 12.4 G/DL — SIGNIFICANT CHANGE UP (ref 11.5–15.5)
MAGNESIUM SERPL-MCNC: 1.9 MG/DL — SIGNIFICANT CHANGE UP (ref 1.6–2.6)
MAGNESIUM SERPL-MCNC: 1.9 MG/DL — SIGNIFICANT CHANGE UP (ref 1.6–2.6)
MCHC RBC-ENTMCNC: 31 PG — SIGNIFICANT CHANGE UP (ref 27–34)
MCHC RBC-ENTMCNC: 31 PG — SIGNIFICANT CHANGE UP (ref 27–34)
MCHC RBC-ENTMCNC: 34.4 GM/DL — SIGNIFICANT CHANGE UP (ref 32–36)
MCHC RBC-ENTMCNC: 34.4 GM/DL — SIGNIFICANT CHANGE UP (ref 32–36)
MCV RBC AUTO: 90 FL — SIGNIFICANT CHANGE UP (ref 80–100)
MCV RBC AUTO: 90 FL — SIGNIFICANT CHANGE UP (ref 80–100)
NT-PROBNP SERPL-SCNC: 5133 PG/ML — HIGH (ref 0–450)
NT-PROBNP SERPL-SCNC: 5133 PG/ML — HIGH (ref 0–450)
PHOSPHATE SERPL-MCNC: 3.9 MG/DL — SIGNIFICANT CHANGE UP (ref 2.5–4.5)
PHOSPHATE SERPL-MCNC: 3.9 MG/DL — SIGNIFICANT CHANGE UP (ref 2.5–4.5)
PLATELET # BLD AUTO: 118 K/UL — LOW (ref 150–400)
PLATELET # BLD AUTO: 118 K/UL — LOW (ref 150–400)
POTASSIUM SERPL-MCNC: 3.9 MMOL/L — SIGNIFICANT CHANGE UP (ref 3.5–5.3)
POTASSIUM SERPL-MCNC: 3.9 MMOL/L — SIGNIFICANT CHANGE UP (ref 3.5–5.3)
POTASSIUM SERPL-SCNC: 3.9 MMOL/L — SIGNIFICANT CHANGE UP (ref 3.5–5.3)
POTASSIUM SERPL-SCNC: 3.9 MMOL/L — SIGNIFICANT CHANGE UP (ref 3.5–5.3)
PROT SERPL-MCNC: 6.5 GM/DL — SIGNIFICANT CHANGE UP (ref 6–8.3)
PROT SERPL-MCNC: 6.5 GM/DL — SIGNIFICANT CHANGE UP (ref 6–8.3)
RBC # BLD: 4 M/UL — SIGNIFICANT CHANGE UP (ref 3.8–5.2)
RBC # BLD: 4 M/UL — SIGNIFICANT CHANGE UP (ref 3.8–5.2)
RBC # FLD: 13.9 % — SIGNIFICANT CHANGE UP (ref 10.3–14.5)
RBC # FLD: 13.9 % — SIGNIFICANT CHANGE UP (ref 10.3–14.5)
SODIUM SERPL-SCNC: 131 MMOL/L — LOW (ref 135–145)
SODIUM SERPL-SCNC: 131 MMOL/L — LOW (ref 135–145)
WBC # BLD: 5.04 K/UL — SIGNIFICANT CHANGE UP (ref 3.8–10.5)
WBC # BLD: 5.04 K/UL — SIGNIFICANT CHANGE UP (ref 3.8–10.5)
WBC # FLD AUTO: 5.04 K/UL — SIGNIFICANT CHANGE UP (ref 3.8–10.5)
WBC # FLD AUTO: 5.04 K/UL — SIGNIFICANT CHANGE UP (ref 3.8–10.5)

## 2023-11-09 PROCEDURE — 99232 SBSQ HOSP IP/OBS MODERATE 35: CPT

## 2023-11-09 RX ORDER — CEFTRIAXONE 500 MG/1
INJECTION, POWDER, FOR SOLUTION INTRAMUSCULAR; INTRAVENOUS
Refills: 0 | Status: COMPLETED | OUTPATIENT
Start: 2023-11-09 | End: 2023-11-13

## 2023-11-09 RX ORDER — CEFTRIAXONE 500 MG/1
1000 INJECTION, POWDER, FOR SOLUTION INTRAMUSCULAR; INTRAVENOUS EVERY 24 HOURS
Refills: 0 | Status: COMPLETED | OUTPATIENT
Start: 2023-11-10 | End: 2023-11-12

## 2023-11-09 RX ORDER — CEFTRIAXONE 500 MG/1
1000 INJECTION, POWDER, FOR SOLUTION INTRAMUSCULAR; INTRAVENOUS ONCE
Refills: 0 | Status: COMPLETED | OUTPATIENT
Start: 2023-11-09 | End: 2023-11-09

## 2023-11-09 RX ADMIN — Medication 3 MILLILITER(S): at 20:08

## 2023-11-09 RX ADMIN — BUDESONIDE AND FORMOTEROL FUMARATE DIHYDRATE 2 PUFF(S): 160; 4.5 AEROSOL RESPIRATORY (INHALATION) at 08:45

## 2023-11-09 RX ADMIN — Medication 3 MILLILITER(S): at 01:39

## 2023-11-09 RX ADMIN — DIVALPROEX SODIUM 125 MILLIGRAM(S): 500 TABLET, DELAYED RELEASE ORAL at 10:16

## 2023-11-09 RX ADMIN — Medication 20 MILLIGRAM(S): at 20:46

## 2023-11-09 RX ADMIN — PANTOPRAZOLE SODIUM 40 MILLIGRAM(S): 20 TABLET, DELAYED RELEASE ORAL at 06:34

## 2023-11-09 RX ADMIN — Medication 3 MILLILITER(S): at 08:45

## 2023-11-09 RX ADMIN — Medication 25 MILLIGRAM(S): at 10:15

## 2023-11-09 RX ADMIN — DIVALPROEX SODIUM 125 MILLIGRAM(S): 500 TABLET, DELAYED RELEASE ORAL at 20:45

## 2023-11-09 RX ADMIN — Medication 20 MILLIGRAM(S): at 14:29

## 2023-11-09 RX ADMIN — Medication 3 MILLIGRAM(S): at 20:45

## 2023-11-09 RX ADMIN — Medication 81 MILLIGRAM(S): at 10:16

## 2023-11-09 RX ADMIN — Medication 3 MILLILITER(S): at 14:12

## 2023-11-09 RX ADMIN — BUDESONIDE AND FORMOTEROL FUMARATE DIHYDRATE 2 PUFF(S): 160; 4.5 AEROSOL RESPIRATORY (INHALATION) at 20:09

## 2023-11-09 RX ADMIN — CEFTRIAXONE 1000 MILLIGRAM(S): 500 INJECTION, POWDER, FOR SOLUTION INTRAMUSCULAR; INTRAVENOUS at 18:17

## 2023-11-09 RX ADMIN — QUETIAPINE FUMARATE 12.5 MILLIGRAM(S): 200 TABLET, FILM COATED ORAL at 20:44

## 2023-11-09 RX ADMIN — Medication 20 MILLIGRAM(S): at 06:32

## 2023-11-09 RX ADMIN — LOSARTAN POTASSIUM 100 MILLIGRAM(S): 100 TABLET, FILM COATED ORAL at 10:16

## 2023-11-09 RX ADMIN — DIVALPROEX SODIUM 125 MILLIGRAM(S): 500 TABLET, DELAYED RELEASE ORAL at 01:36

## 2023-11-09 NOTE — DIETITIAN INITIAL EVALUATION ADULT - IDEAL BODY WEIGHT (LBS)
110 Advancement-Rotation Flap Text: The defect edges were debeveled with a #15 scalpel blade. Given the location of the defect, shape of the defect and the proximity to free margins an advancement-rotation flap was deemed most appropriate. Using a sterile surgical marker, an appropriate flap was drawn incorporating the defect and placing the expected incisions within the relaxed skin tension lines where possible. The area thus outlined was incised deep to adipose tissue with a #15 scalpel blade. The skin margins were undermined to an appropriate distance in all directions utilizing iris scissors. Following this, the designed flap was carried over into the primary defect and sutured into place.

## 2023-11-09 NOTE — DIETITIAN INITIAL EVALUATION ADULT - PERTINENT LABORATORY DATA
11-09    131<L>  |  98  |  26<H>  ----------------------------<  136<H>  3.9   |  24  |  0.75    Ca    9.4      09 Nov 2023 06:16  Phos  3.9     11-09  Mg     1.9     11-09    TPro  6.5  /  Alb  3.2<L>  /  TBili  0.7  /  DBili  x   /  AST  13<L>  /  ALT  14  /  AlkPhos  79  11-09  A1C with Estimated Average Glucose Result: 5.9 % (04-04-23 @ 06:56)  A1C with Estimated Average Glucose Result: 5.8 % (03-29-23 @ 07:09)

## 2023-11-09 NOTE — PHYSICAL THERAPY INITIAL EVALUATION ADULT - MODALITIES TREATMENT COMMENTS
pt left seated in recliner @ nurse's station post Eval @ RN manager Lorena's request; chair alarm donned, pulse oxym in place; LE's elevated; pt instructed not to get up alone; call nursing for assist; ngoc well; denied pain; AMOL Fischer made aware pt OOB

## 2023-11-09 NOTE — DIETITIAN INITIAL EVALUATION ADULT - NSFNSGIIOFT_GEN_A_CORE
I&O's Detail    08 Nov 2023 07:01  -  09 Nov 2023 07:00  --------------------------------------------------------  IN:    Oral Fluid: 240 mL  Total IN: 240 mL    OUT:  Total OUT: 0 mL    Total NET: 240 mL

## 2023-11-09 NOTE — DIETITIAN INITIAL EVALUATION ADULT - ADD RECOMMEND
1. C/w regular diet to maximize PO intake and encourage high-kcal, protein-rich foods, maximize food preferences   2. Add ensure plus high protein TID to optimize PO intake (provides 350 kcal, 20g protein/ shake)   3. Consider adding appetite stimulant such as Remeron or Marinol 2/2 chronically poor appetite/ PO intake   4. Maintain aspiration precautions, back of bed >45 degrees.   5. Monitor bowel movements, if no BM for >3 days, consider implementing bowel regimen.   6. Consider obtaining vitamin D 25OH level to assess nutriture   7. Consider adding thiamine 100 mg daily 2/2 poor PO intake/ malnutrition   8. Recommend MVI w/ minerals daily to ensure 100% RDA met   RD will continue to monitor PO intake, labs, hydration, and wt prn.

## 2023-11-09 NOTE — DIETITIAN INITIAL EVALUATION ADULT - PERTINENT MEDS FT
MEDICATIONS  (STANDING):  albuterol/ipratropium for Nebulization 3 milliLiter(s) Nebulizer every 6 hours  aspirin enteric coated 81 milliGRAM(s) Oral daily  budesonide 160 MICROgram(s)/formoterol 4.5 MICROgram(s) Inhaler 2 Puff(s) Inhalation two times a day  divalproex  milliGRAM(s) Oral two times a day  losartan 100 milliGRAM(s) Oral daily  methylPREDNISolone sodium succinate Injectable 20 milliGRAM(s) IV Push every 8 hours  metoprolol succinate ER 25 milliGRAM(s) Oral daily  pantoprazole    Tablet 40 milliGRAM(s) Oral before breakfast    MEDICATIONS  (PRN):  acetaminophen     Tablet .. 650 milliGRAM(s) Oral every 6 hours PRN Temp greater or equal to 38C (100.4F), Mild Pain (1 - 3)  albuterol    90 MICROgram(s) HFA Inhaler 2 Puff(s) Inhalation every 6 hours PRN Shortness of Breath  aluminum hydroxide/magnesium hydroxide/simethicone Suspension 30 milliLiter(s) Oral every 4 hours PRN Dyspepsia  haloperidol    Injectable 1 milliGRAM(s) IntraMuscular every 12 hours PRN Agitation  melatonin 3 milliGRAM(s) Oral at bedtime PRN Insomnia  ondansetron Injectable 4 milliGRAM(s) IV Push every 8 hours PRN Nausea and/or Vomiting  QUEtiapine 12.5 milliGRAM(s) Oral every 8 hours PRN agitation

## 2023-11-10 LAB
-  AMOXICILLIN/CLAVULANIC ACID: SIGNIFICANT CHANGE UP
-  AMOXICILLIN/CLAVULANIC ACID: SIGNIFICANT CHANGE UP
-  AMPICILLIN/SULBACTAM: SIGNIFICANT CHANGE UP
-  AMPICILLIN/SULBACTAM: SIGNIFICANT CHANGE UP
-  AMPICILLIN: SIGNIFICANT CHANGE UP
-  AMPICILLIN: SIGNIFICANT CHANGE UP
-  AZTREONAM: SIGNIFICANT CHANGE UP
-  AZTREONAM: SIGNIFICANT CHANGE UP
-  CEFAZOLIN: SIGNIFICANT CHANGE UP
-  CEFAZOLIN: SIGNIFICANT CHANGE UP
-  CEFEPIME: SIGNIFICANT CHANGE UP
-  CEFEPIME: SIGNIFICANT CHANGE UP
-  CEFOXITIN: SIGNIFICANT CHANGE UP
-  CEFOXITIN: SIGNIFICANT CHANGE UP
-  CEFTRIAXONE: SIGNIFICANT CHANGE UP
-  CEFTRIAXONE: SIGNIFICANT CHANGE UP
-  CEFUROXIME: SIGNIFICANT CHANGE UP
-  CEFUROXIME: SIGNIFICANT CHANGE UP
-  CIPROFLOXACIN: SIGNIFICANT CHANGE UP
-  CIPROFLOXACIN: SIGNIFICANT CHANGE UP
-  ERTAPENEM: SIGNIFICANT CHANGE UP
-  ERTAPENEM: SIGNIFICANT CHANGE UP
-  GENTAMICIN: SIGNIFICANT CHANGE UP
-  GENTAMICIN: SIGNIFICANT CHANGE UP
-  IMIPENEM: SIGNIFICANT CHANGE UP
-  IMIPENEM: SIGNIFICANT CHANGE UP
-  LEVOFLOXACIN: SIGNIFICANT CHANGE UP
-  LEVOFLOXACIN: SIGNIFICANT CHANGE UP
-  MEROPENEM: SIGNIFICANT CHANGE UP
-  MEROPENEM: SIGNIFICANT CHANGE UP
-  NITROFURANTOIN: SIGNIFICANT CHANGE UP
-  NITROFURANTOIN: SIGNIFICANT CHANGE UP
-  PIPERACILLIN/TAZOBACTAM: SIGNIFICANT CHANGE UP
-  PIPERACILLIN/TAZOBACTAM: SIGNIFICANT CHANGE UP
-  TOBRAMYCIN: SIGNIFICANT CHANGE UP
-  TOBRAMYCIN: SIGNIFICANT CHANGE UP
-  TRIMETHOPRIM/SULFAMETHOXAZOLE: SIGNIFICANT CHANGE UP
-  TRIMETHOPRIM/SULFAMETHOXAZOLE: SIGNIFICANT CHANGE UP
24R-OH-CALCIDIOL SERPL-MCNC: 21 NG/ML — LOW (ref 30–80)
24R-OH-CALCIDIOL SERPL-MCNC: 21 NG/ML — LOW (ref 30–80)
ALBUMIN SERPL ELPH-MCNC: 3.1 G/DL — LOW (ref 3.3–5)
ALBUMIN SERPL ELPH-MCNC: 3.1 G/DL — LOW (ref 3.3–5)
ALP SERPL-CCNC: 75 U/L — SIGNIFICANT CHANGE UP (ref 40–120)
ALP SERPL-CCNC: 75 U/L — SIGNIFICANT CHANGE UP (ref 40–120)
ALT FLD-CCNC: 15 U/L — SIGNIFICANT CHANGE UP (ref 12–78)
ALT FLD-CCNC: 15 U/L — SIGNIFICANT CHANGE UP (ref 12–78)
AMMONIA BLD-MCNC: <10 UMOL/L — LOW (ref 11–32)
AMMONIA BLD-MCNC: <10 UMOL/L — LOW (ref 11–32)
ANION GAP SERPL CALC-SCNC: 4 MMOL/L — LOW (ref 5–17)
ANION GAP SERPL CALC-SCNC: 4 MMOL/L — LOW (ref 5–17)
AST SERPL-CCNC: 14 U/L — LOW (ref 15–37)
AST SERPL-CCNC: 14 U/L — LOW (ref 15–37)
BASOPHILS # BLD AUTO: 0 K/UL — SIGNIFICANT CHANGE UP (ref 0–0.2)
BASOPHILS # BLD AUTO: 0 K/UL — SIGNIFICANT CHANGE UP (ref 0–0.2)
BASOPHILS NFR BLD AUTO: 0 % — SIGNIFICANT CHANGE UP (ref 0–2)
BASOPHILS NFR BLD AUTO: 0 % — SIGNIFICANT CHANGE UP (ref 0–2)
BILIRUB SERPL-MCNC: 0.5 MG/DL — SIGNIFICANT CHANGE UP (ref 0.2–1.2)
BILIRUB SERPL-MCNC: 0.5 MG/DL — SIGNIFICANT CHANGE UP (ref 0.2–1.2)
BUN SERPL-MCNC: 34 MG/DL — HIGH (ref 7–23)
BUN SERPL-MCNC: 34 MG/DL — HIGH (ref 7–23)
CALCIUM SERPL-MCNC: 9.1 MG/DL — SIGNIFICANT CHANGE UP (ref 8.5–10.1)
CALCIUM SERPL-MCNC: 9.1 MG/DL — SIGNIFICANT CHANGE UP (ref 8.5–10.1)
CHLORIDE SERPL-SCNC: 101 MMOL/L — SIGNIFICANT CHANGE UP (ref 96–108)
CHLORIDE SERPL-SCNC: 101 MMOL/L — SIGNIFICANT CHANGE UP (ref 96–108)
CO2 SERPL-SCNC: 29 MMOL/L — SIGNIFICANT CHANGE UP (ref 22–31)
CO2 SERPL-SCNC: 29 MMOL/L — SIGNIFICANT CHANGE UP (ref 22–31)
CREAT SERPL-MCNC: 0.87 MG/DL — SIGNIFICANT CHANGE UP (ref 0.5–1.3)
CREAT SERPL-MCNC: 0.87 MG/DL — SIGNIFICANT CHANGE UP (ref 0.5–1.3)
CRP SERPL-MCNC: 3 MG/L — SIGNIFICANT CHANGE UP
CRP SERPL-MCNC: 3 MG/L — SIGNIFICANT CHANGE UP
CULTURE RESULTS: ABNORMAL
CULTURE RESULTS: ABNORMAL
EGFR: 65 ML/MIN/1.73M2 — SIGNIFICANT CHANGE UP
EGFR: 65 ML/MIN/1.73M2 — SIGNIFICANT CHANGE UP
EOSINOPHIL # BLD AUTO: 0 K/UL — SIGNIFICANT CHANGE UP (ref 0–0.5)
EOSINOPHIL # BLD AUTO: 0 K/UL — SIGNIFICANT CHANGE UP (ref 0–0.5)
EOSINOPHIL NFR BLD AUTO: 0 % — SIGNIFICANT CHANGE UP (ref 0–6)
EOSINOPHIL NFR BLD AUTO: 0 % — SIGNIFICANT CHANGE UP (ref 0–6)
ERYTHROCYTE [SEDIMENTATION RATE] IN BLOOD: 2 MM/HR — SIGNIFICANT CHANGE UP (ref 0–20)
ERYTHROCYTE [SEDIMENTATION RATE] IN BLOOD: 2 MM/HR — SIGNIFICANT CHANGE UP (ref 0–20)
FOLATE SERPL-MCNC: 5.9 NG/ML — SIGNIFICANT CHANGE UP
FOLATE SERPL-MCNC: 5.9 NG/ML — SIGNIFICANT CHANGE UP
GLUCOSE SERPL-MCNC: 131 MG/DL — HIGH (ref 70–99)
GLUCOSE SERPL-MCNC: 131 MG/DL — HIGH (ref 70–99)
HCT VFR BLD CALC: 37.2 % — SIGNIFICANT CHANGE UP (ref 34.5–45)
HCT VFR BLD CALC: 37.2 % — SIGNIFICANT CHANGE UP (ref 34.5–45)
HGB BLD-MCNC: 12.7 G/DL — SIGNIFICANT CHANGE UP (ref 11.5–15.5)
HGB BLD-MCNC: 12.7 G/DL — SIGNIFICANT CHANGE UP (ref 11.5–15.5)
LYMPHOCYTES # BLD AUTO: 0.53 K/UL — LOW (ref 1–3.3)
LYMPHOCYTES # BLD AUTO: 0.53 K/UL — LOW (ref 1–3.3)
LYMPHOCYTES # BLD AUTO: 9 % — LOW (ref 13–44)
LYMPHOCYTES # BLD AUTO: 9 % — LOW (ref 13–44)
MAGNESIUM SERPL-MCNC: 2 MG/DL — SIGNIFICANT CHANGE UP (ref 1.6–2.6)
MAGNESIUM SERPL-MCNC: 2 MG/DL — SIGNIFICANT CHANGE UP (ref 1.6–2.6)
MANUAL SMEAR VERIFICATION: SIGNIFICANT CHANGE UP
MANUAL SMEAR VERIFICATION: SIGNIFICANT CHANGE UP
MCHC RBC-ENTMCNC: 31.4 PG — SIGNIFICANT CHANGE UP (ref 27–34)
MCHC RBC-ENTMCNC: 31.4 PG — SIGNIFICANT CHANGE UP (ref 27–34)
MCHC RBC-ENTMCNC: 34.1 GM/DL — SIGNIFICANT CHANGE UP (ref 32–36)
MCHC RBC-ENTMCNC: 34.1 GM/DL — SIGNIFICANT CHANGE UP (ref 32–36)
MCV RBC AUTO: 92.1 FL — SIGNIFICANT CHANGE UP (ref 80–100)
MCV RBC AUTO: 92.1 FL — SIGNIFICANT CHANGE UP (ref 80–100)
METHOD TYPE: SIGNIFICANT CHANGE UP
METHOD TYPE: SIGNIFICANT CHANGE UP
MONOCYTES # BLD AUTO: 0.12 K/UL — SIGNIFICANT CHANGE UP (ref 0–0.9)
MONOCYTES # BLD AUTO: 0.12 K/UL — SIGNIFICANT CHANGE UP (ref 0–0.9)
MONOCYTES NFR BLD AUTO: 2 % — SIGNIFICANT CHANGE UP (ref 2–14)
MONOCYTES NFR BLD AUTO: 2 % — SIGNIFICANT CHANGE UP (ref 2–14)
NEUTROPHILS # BLD AUTO: 5.22 K/UL — SIGNIFICANT CHANGE UP (ref 1.8–7.4)
NEUTROPHILS # BLD AUTO: 5.22 K/UL — SIGNIFICANT CHANGE UP (ref 1.8–7.4)
NEUTROPHILS NFR BLD AUTO: 89 % — HIGH (ref 43–77)
NEUTROPHILS NFR BLD AUTO: 89 % — HIGH (ref 43–77)
NRBC # BLD: 0 /100 — SIGNIFICANT CHANGE UP (ref 0–0)
NRBC # BLD: 0 /100 — SIGNIFICANT CHANGE UP (ref 0–0)
NRBC # BLD: SIGNIFICANT CHANGE UP /100 WBCS (ref 0–0)
NRBC # BLD: SIGNIFICANT CHANGE UP /100 WBCS (ref 0–0)
NT-PROBNP SERPL-SCNC: 5493 PG/ML — HIGH (ref 0–450)
NT-PROBNP SERPL-SCNC: 5493 PG/ML — HIGH (ref 0–450)
ORGANISM # SPEC MICROSCOPIC CNT: ABNORMAL
ORGANISM # SPEC MICROSCOPIC CNT: ABNORMAL
ORGANISM # SPEC MICROSCOPIC CNT: SIGNIFICANT CHANGE UP
ORGANISM # SPEC MICROSCOPIC CNT: SIGNIFICANT CHANGE UP
PHOSPHATE SERPL-MCNC: 3.9 MG/DL — SIGNIFICANT CHANGE UP (ref 2.5–4.5)
PHOSPHATE SERPL-MCNC: 3.9 MG/DL — SIGNIFICANT CHANGE UP (ref 2.5–4.5)
PLAT MORPH BLD: NORMAL — SIGNIFICANT CHANGE UP
PLAT MORPH BLD: NORMAL — SIGNIFICANT CHANGE UP
PLATELET # BLD AUTO: 123 K/UL — LOW (ref 150–400)
PLATELET # BLD AUTO: 123 K/UL — LOW (ref 150–400)
POTASSIUM SERPL-MCNC: 4.4 MMOL/L — SIGNIFICANT CHANGE UP (ref 3.5–5.3)
POTASSIUM SERPL-MCNC: 4.4 MMOL/L — SIGNIFICANT CHANGE UP (ref 3.5–5.3)
POTASSIUM SERPL-SCNC: 4.4 MMOL/L — SIGNIFICANT CHANGE UP (ref 3.5–5.3)
POTASSIUM SERPL-SCNC: 4.4 MMOL/L — SIGNIFICANT CHANGE UP (ref 3.5–5.3)
PROCALCITONIN SERPL-MCNC: 0.05 NG/ML — SIGNIFICANT CHANGE UP (ref 0.02–0.1)
PROCALCITONIN SERPL-MCNC: 0.05 NG/ML — SIGNIFICANT CHANGE UP (ref 0.02–0.1)
PROT SERPL-MCNC: 6.6 GM/DL — SIGNIFICANT CHANGE UP (ref 6–8.3)
PROT SERPL-MCNC: 6.6 GM/DL — SIGNIFICANT CHANGE UP (ref 6–8.3)
RBC # BLD: 4.04 M/UL — SIGNIFICANT CHANGE UP (ref 3.8–5.2)
RBC # BLD: 4.04 M/UL — SIGNIFICANT CHANGE UP (ref 3.8–5.2)
RBC # FLD: 14.2 % — SIGNIFICANT CHANGE UP (ref 10.3–14.5)
RBC # FLD: 14.2 % — SIGNIFICANT CHANGE UP (ref 10.3–14.5)
RBC BLD AUTO: NORMAL — SIGNIFICANT CHANGE UP
RBC BLD AUTO: NORMAL — SIGNIFICANT CHANGE UP
SODIUM SERPL-SCNC: 134 MMOL/L — LOW (ref 135–145)
SODIUM SERPL-SCNC: 134 MMOL/L — LOW (ref 135–145)
SPECIMEN SOURCE: SIGNIFICANT CHANGE UP
SPECIMEN SOURCE: SIGNIFICANT CHANGE UP
TSH SERPL-MCNC: 1.24 UU/ML — SIGNIFICANT CHANGE UP (ref 0.34–4.82)
TSH SERPL-MCNC: 1.24 UU/ML — SIGNIFICANT CHANGE UP (ref 0.34–4.82)
VIT B12 SERPL-MCNC: 1067 PG/ML — SIGNIFICANT CHANGE UP (ref 232–1245)
VIT B12 SERPL-MCNC: 1067 PG/ML — SIGNIFICANT CHANGE UP (ref 232–1245)
WBC # BLD: 5.87 K/UL — SIGNIFICANT CHANGE UP (ref 3.8–10.5)
WBC # BLD: 5.87 K/UL — SIGNIFICANT CHANGE UP (ref 3.8–10.5)
WBC # FLD AUTO: 5.87 K/UL — SIGNIFICANT CHANGE UP (ref 3.8–10.5)
WBC # FLD AUTO: 5.87 K/UL — SIGNIFICANT CHANGE UP (ref 3.8–10.5)

## 2023-11-10 PROCEDURE — 99232 SBSQ HOSP IP/OBS MODERATE 35: CPT

## 2023-11-10 RX ORDER — BETHANECHOL CHLORIDE 25 MG
10 TABLET ORAL THREE TIMES A DAY
Refills: 0 | Status: DISCONTINUED | OUTPATIENT
Start: 2023-11-10 | End: 2023-11-14

## 2023-11-10 RX ORDER — CHOLECALCIFEROL (VITAMIN D3) 125 MCG
2000 CAPSULE ORAL AT BEDTIME
Refills: 0 | Status: DISCONTINUED | OUTPATIENT
Start: 2023-11-10 | End: 2023-11-14

## 2023-11-10 RX ADMIN — LOSARTAN POTASSIUM 100 MILLIGRAM(S): 100 TABLET, FILM COATED ORAL at 10:17

## 2023-11-10 RX ADMIN — DIVALPROEX SODIUM 125 MILLIGRAM(S): 500 TABLET, DELAYED RELEASE ORAL at 10:16

## 2023-11-10 RX ADMIN — Medication 81 MILLIGRAM(S): at 10:17

## 2023-11-10 RX ADMIN — Medication 20 MILLIGRAM(S): at 05:47

## 2023-11-10 RX ADMIN — QUETIAPINE FUMARATE 12.5 MILLIGRAM(S): 200 TABLET, FILM COATED ORAL at 17:20

## 2023-11-10 RX ADMIN — Medication 25 MILLIGRAM(S): at 10:16

## 2023-11-10 RX ADMIN — Medication 3 MILLILITER(S): at 02:08

## 2023-11-10 RX ADMIN — BUDESONIDE AND FORMOTEROL FUMARATE DIHYDRATE 2 PUFF(S): 160; 4.5 AEROSOL RESPIRATORY (INHALATION) at 08:46

## 2023-11-10 RX ADMIN — Medication 3 MILLILITER(S): at 20:42

## 2023-11-10 RX ADMIN — BUDESONIDE AND FORMOTEROL FUMARATE DIHYDRATE 2 PUFF(S): 160; 4.5 AEROSOL RESPIRATORY (INHALATION) at 20:43

## 2023-11-10 RX ADMIN — Medication 20 MILLIGRAM(S): at 22:46

## 2023-11-10 RX ADMIN — Medication 3 MILLILITER(S): at 08:44

## 2023-11-10 RX ADMIN — CEFTRIAXONE 1000 MILLIGRAM(S): 500 INJECTION, POWDER, FOR SOLUTION INTRAMUSCULAR; INTRAVENOUS at 15:48

## 2023-11-11 LAB
ANION GAP SERPL CALC-SCNC: 8 MMOL/L — SIGNIFICANT CHANGE UP (ref 5–17)
ANION GAP SERPL CALC-SCNC: 8 MMOL/L — SIGNIFICANT CHANGE UP (ref 5–17)
BUN SERPL-MCNC: 37 MG/DL — HIGH (ref 7–23)
BUN SERPL-MCNC: 37 MG/DL — HIGH (ref 7–23)
CALCIUM SERPL-MCNC: 9.3 MG/DL — SIGNIFICANT CHANGE UP (ref 8.5–10.1)
CALCIUM SERPL-MCNC: 9.3 MG/DL — SIGNIFICANT CHANGE UP (ref 8.5–10.1)
CHLORIDE SERPL-SCNC: 100 MMOL/L — SIGNIFICANT CHANGE UP (ref 96–108)
CHLORIDE SERPL-SCNC: 100 MMOL/L — SIGNIFICANT CHANGE UP (ref 96–108)
CO2 SERPL-SCNC: 27 MMOL/L — SIGNIFICANT CHANGE UP (ref 22–31)
CO2 SERPL-SCNC: 27 MMOL/L — SIGNIFICANT CHANGE UP (ref 22–31)
CREAT SERPL-MCNC: 0.77 MG/DL — SIGNIFICANT CHANGE UP (ref 0.5–1.3)
CREAT SERPL-MCNC: 0.77 MG/DL — SIGNIFICANT CHANGE UP (ref 0.5–1.3)
EGFR: 75 ML/MIN/1.73M2 — SIGNIFICANT CHANGE UP
EGFR: 75 ML/MIN/1.73M2 — SIGNIFICANT CHANGE UP
GLUCOSE SERPL-MCNC: 123 MG/DL — HIGH (ref 70–99)
GLUCOSE SERPL-MCNC: 123 MG/DL — HIGH (ref 70–99)
HCT VFR BLD CALC: 39.5 % — SIGNIFICANT CHANGE UP (ref 34.5–45)
HCT VFR BLD CALC: 39.5 % — SIGNIFICANT CHANGE UP (ref 34.5–45)
HGB BLD-MCNC: 13.1 G/DL — SIGNIFICANT CHANGE UP (ref 11.5–15.5)
HGB BLD-MCNC: 13.1 G/DL — SIGNIFICANT CHANGE UP (ref 11.5–15.5)
MAGNESIUM SERPL-MCNC: 2.1 MG/DL — SIGNIFICANT CHANGE UP (ref 1.6–2.6)
MAGNESIUM SERPL-MCNC: 2.1 MG/DL — SIGNIFICANT CHANGE UP (ref 1.6–2.6)
MCHC RBC-ENTMCNC: 30.6 PG — SIGNIFICANT CHANGE UP (ref 27–34)
MCHC RBC-ENTMCNC: 30.6 PG — SIGNIFICANT CHANGE UP (ref 27–34)
MCHC RBC-ENTMCNC: 33.2 GM/DL — SIGNIFICANT CHANGE UP (ref 32–36)
MCHC RBC-ENTMCNC: 33.2 GM/DL — SIGNIFICANT CHANGE UP (ref 32–36)
MCV RBC AUTO: 92.3 FL — SIGNIFICANT CHANGE UP (ref 80–100)
MCV RBC AUTO: 92.3 FL — SIGNIFICANT CHANGE UP (ref 80–100)
PHOSPHATE SERPL-MCNC: 3.6 MG/DL — SIGNIFICANT CHANGE UP (ref 2.5–4.5)
PHOSPHATE SERPL-MCNC: 3.6 MG/DL — SIGNIFICANT CHANGE UP (ref 2.5–4.5)
PLATELET # BLD AUTO: 131 K/UL — LOW (ref 150–400)
PLATELET # BLD AUTO: 131 K/UL — LOW (ref 150–400)
POTASSIUM SERPL-MCNC: 4.5 MMOL/L — SIGNIFICANT CHANGE UP (ref 3.5–5.3)
POTASSIUM SERPL-MCNC: 4.5 MMOL/L — SIGNIFICANT CHANGE UP (ref 3.5–5.3)
POTASSIUM SERPL-SCNC: 4.5 MMOL/L — SIGNIFICANT CHANGE UP (ref 3.5–5.3)
POTASSIUM SERPL-SCNC: 4.5 MMOL/L — SIGNIFICANT CHANGE UP (ref 3.5–5.3)
RBC # BLD: 4.28 M/UL — SIGNIFICANT CHANGE UP (ref 3.8–5.2)
RBC # BLD: 4.28 M/UL — SIGNIFICANT CHANGE UP (ref 3.8–5.2)
RBC # FLD: 14.3 % — SIGNIFICANT CHANGE UP (ref 10.3–14.5)
RBC # FLD: 14.3 % — SIGNIFICANT CHANGE UP (ref 10.3–14.5)
SODIUM SERPL-SCNC: 135 MMOL/L — SIGNIFICANT CHANGE UP (ref 135–145)
SODIUM SERPL-SCNC: 135 MMOL/L — SIGNIFICANT CHANGE UP (ref 135–145)
WBC # BLD: 5.54 K/UL — SIGNIFICANT CHANGE UP (ref 3.8–10.5)
WBC # BLD: 5.54 K/UL — SIGNIFICANT CHANGE UP (ref 3.8–10.5)
WBC # FLD AUTO: 5.54 K/UL — SIGNIFICANT CHANGE UP (ref 3.8–10.5)
WBC # FLD AUTO: 5.54 K/UL — SIGNIFICANT CHANGE UP (ref 3.8–10.5)

## 2023-11-11 PROCEDURE — 99232 SBSQ HOSP IP/OBS MODERATE 35: CPT

## 2023-11-11 RX ORDER — SODIUM CHLORIDE 9 MG/ML
750 INJECTION INTRAMUSCULAR; INTRAVENOUS; SUBCUTANEOUS ONCE
Refills: 0 | Status: COMPLETED | OUTPATIENT
Start: 2023-11-11 | End: 2023-11-11

## 2023-11-11 RX ADMIN — Medication 25 MILLIGRAM(S): at 10:13

## 2023-11-11 RX ADMIN — PANTOPRAZOLE SODIUM 40 MILLIGRAM(S): 20 TABLET, DELAYED RELEASE ORAL at 06:32

## 2023-11-11 RX ADMIN — Medication 2000 UNIT(S): at 01:24

## 2023-11-11 RX ADMIN — LOSARTAN POTASSIUM 100 MILLIGRAM(S): 100 TABLET, FILM COATED ORAL at 10:13

## 2023-11-11 RX ADMIN — BUDESONIDE AND FORMOTEROL FUMARATE DIHYDRATE 2 PUFF(S): 160; 4.5 AEROSOL RESPIRATORY (INHALATION) at 08:24

## 2023-11-11 RX ADMIN — Medication 3 MILLILITER(S): at 20:43

## 2023-11-11 RX ADMIN — Medication 5 MILLIGRAM(S): at 01:26

## 2023-11-11 RX ADMIN — Medication 20 MILLIGRAM(S): at 22:10

## 2023-11-11 RX ADMIN — DIVALPROEX SODIUM 125 MILLIGRAM(S): 500 TABLET, DELAYED RELEASE ORAL at 22:14

## 2023-11-11 RX ADMIN — Medication 2000 UNIT(S): at 22:14

## 2023-11-11 RX ADMIN — Medication 10 MILLIGRAM(S): at 06:32

## 2023-11-11 RX ADMIN — Medication 20 MILLIGRAM(S): at 10:12

## 2023-11-11 RX ADMIN — Medication 5 MILLIGRAM(S): at 22:14

## 2023-11-11 RX ADMIN — Medication 3 MILLILITER(S): at 02:19

## 2023-11-11 RX ADMIN — CEFTRIAXONE 1000 MILLIGRAM(S): 500 INJECTION, POWDER, FOR SOLUTION INTRAMUSCULAR; INTRAVENOUS at 15:20

## 2023-11-11 RX ADMIN — QUETIAPINE FUMARATE 12.5 MILLIGRAM(S): 200 TABLET, FILM COATED ORAL at 15:20

## 2023-11-11 RX ADMIN — DIVALPROEX SODIUM 125 MILLIGRAM(S): 500 TABLET, DELAYED RELEASE ORAL at 10:13

## 2023-11-11 RX ADMIN — HALOPERIDOL DECANOATE 1 MILLIGRAM(S): 100 INJECTION INTRAMUSCULAR at 19:46

## 2023-11-11 RX ADMIN — Medication 10 MILLIGRAM(S): at 01:25

## 2023-11-11 RX ADMIN — Medication 10 MILLIGRAM(S): at 13:20

## 2023-11-11 RX ADMIN — BUDESONIDE AND FORMOTEROL FUMARATE DIHYDRATE 2 PUFF(S): 160; 4.5 AEROSOL RESPIRATORY (INHALATION) at 20:42

## 2023-11-11 RX ADMIN — SODIUM CHLORIDE 187.5 MILLILITER(S): 9 INJECTION INTRAMUSCULAR; INTRAVENOUS; SUBCUTANEOUS at 11:17

## 2023-11-11 RX ADMIN — DIVALPROEX SODIUM 125 MILLIGRAM(S): 500 TABLET, DELAYED RELEASE ORAL at 01:26

## 2023-11-11 RX ADMIN — Medication 3 MILLILITER(S): at 08:23

## 2023-11-11 RX ADMIN — Medication 10 MILLIGRAM(S): at 22:14

## 2023-11-11 RX ADMIN — Medication 81 MILLIGRAM(S): at 10:13

## 2023-11-11 NOTE — SWALLOW BEDSIDE ASSESSMENT ADULT - ORAL PHASE
immediate bolus ofrmation  forliquid and puree. Mastication normally rotary-lateral with lingual sweep for collection and clearance./Within functional limits

## 2023-11-11 NOTE — SWALLOW BEDSIDE ASSESSMENT ADULT - NS SPL SWALLOW CLINIC TRIAL FT
Given admitting diagnosis, Pt is likely to have some coughing on taking po.  Note circumstances and frequency.  As per today's bedside evaluation, pt shows no contraindication for maintaiing the soft and bite size texture and thin liquids.  meds crushed in puree/pudding.  Suggest use only cup and no straw drinking. Pt upright for meals, and tell pt what is presented.  Disc with NSg.

## 2023-11-11 NOTE — SWALLOW BEDSIDE ASSESSMENT ADULT - ORAL PREPARATORY PHASE
orientation to eating routines. inconsistent reduced oral aperture to accept spoon, and inconsistent premature closure. Lip seal on cup and spoon  oral containment of liquid and of solid./Within functional limits

## 2023-11-11 NOTE — SWALLOW BEDSIDE ASSESSMENT ADULT - SLP GENERAL OBSERVATIONS
pt seated in bed; awake and alert, able to participate; some mild confusion but pt has hearing loss, needs repetition as well as loud voice.  thin and frail , but genial (as per Nsg, pt has periods of combativeness ) verbally responsive and at times interactive. VoIce is weak with instances of aphonia; also Pt's breathing at times shallow and wheezing.

## 2023-11-11 NOTE — SWALLOW BEDSIDE ASSESSMENT ADULT - SWALLOW EVAL: DIAGNOSIS
oral-pharyngeal dysphagia for a modified texture diet in a setting of dementia, present onset of dyspnea and wheezing.

## 2023-11-11 NOTE — SWALLOW BEDSIDE ASSESSMENT ADULT - PHARYNGEAL PHASE
Onset of pharyngeal swallow within timely parameters with observable laryngeal lift and no overt s/s aspiration.  pt had a very late chesty phlegmy cough, not related to swallowing./Within functional limits

## 2023-11-12 LAB
ANION GAP SERPL CALC-SCNC: 7 MMOL/L — SIGNIFICANT CHANGE UP (ref 5–17)
ANION GAP SERPL CALC-SCNC: 7 MMOL/L — SIGNIFICANT CHANGE UP (ref 5–17)
BUN SERPL-MCNC: 37 MG/DL — HIGH (ref 7–23)
BUN SERPL-MCNC: 37 MG/DL — HIGH (ref 7–23)
CALCIUM SERPL-MCNC: 9.2 MG/DL — SIGNIFICANT CHANGE UP (ref 8.5–10.1)
CALCIUM SERPL-MCNC: 9.2 MG/DL — SIGNIFICANT CHANGE UP (ref 8.5–10.1)
CHLORIDE SERPL-SCNC: 105 MMOL/L — SIGNIFICANT CHANGE UP (ref 96–108)
CHLORIDE SERPL-SCNC: 105 MMOL/L — SIGNIFICANT CHANGE UP (ref 96–108)
CO2 SERPL-SCNC: 26 MMOL/L — SIGNIFICANT CHANGE UP (ref 22–31)
CO2 SERPL-SCNC: 26 MMOL/L — SIGNIFICANT CHANGE UP (ref 22–31)
CREAT SERPL-MCNC: 0.69 MG/DL — SIGNIFICANT CHANGE UP (ref 0.5–1.3)
CREAT SERPL-MCNC: 0.69 MG/DL — SIGNIFICANT CHANGE UP (ref 0.5–1.3)
EGFR: 84 ML/MIN/1.73M2 — SIGNIFICANT CHANGE UP
EGFR: 84 ML/MIN/1.73M2 — SIGNIFICANT CHANGE UP
GLUCOSE SERPL-MCNC: 135 MG/DL — HIGH (ref 70–99)
GLUCOSE SERPL-MCNC: 135 MG/DL — HIGH (ref 70–99)
HCT VFR BLD CALC: 38.7 % — SIGNIFICANT CHANGE UP (ref 34.5–45)
HCT VFR BLD CALC: 38.7 % — SIGNIFICANT CHANGE UP (ref 34.5–45)
HGB BLD-MCNC: 12.8 G/DL — SIGNIFICANT CHANGE UP (ref 11.5–15.5)
HGB BLD-MCNC: 12.8 G/DL — SIGNIFICANT CHANGE UP (ref 11.5–15.5)
MAGNESIUM SERPL-MCNC: 2.2 MG/DL — SIGNIFICANT CHANGE UP (ref 1.6–2.6)
MAGNESIUM SERPL-MCNC: 2.2 MG/DL — SIGNIFICANT CHANGE UP (ref 1.6–2.6)
MCHC RBC-ENTMCNC: 30.7 PG — SIGNIFICANT CHANGE UP (ref 27–34)
MCHC RBC-ENTMCNC: 30.7 PG — SIGNIFICANT CHANGE UP (ref 27–34)
MCHC RBC-ENTMCNC: 33.1 GM/DL — SIGNIFICANT CHANGE UP (ref 32–36)
MCHC RBC-ENTMCNC: 33.1 GM/DL — SIGNIFICANT CHANGE UP (ref 32–36)
MCV RBC AUTO: 92.8 FL — SIGNIFICANT CHANGE UP (ref 80–100)
MCV RBC AUTO: 92.8 FL — SIGNIFICANT CHANGE UP (ref 80–100)
PHOSPHATE SERPL-MCNC: 3.4 MG/DL — SIGNIFICANT CHANGE UP (ref 2.5–4.5)
PHOSPHATE SERPL-MCNC: 3.4 MG/DL — SIGNIFICANT CHANGE UP (ref 2.5–4.5)
PLATELET # BLD AUTO: 133 K/UL — LOW (ref 150–400)
PLATELET # BLD AUTO: 133 K/UL — LOW (ref 150–400)
POTASSIUM SERPL-MCNC: 4.2 MMOL/L — SIGNIFICANT CHANGE UP (ref 3.5–5.3)
POTASSIUM SERPL-MCNC: 4.2 MMOL/L — SIGNIFICANT CHANGE UP (ref 3.5–5.3)
POTASSIUM SERPL-SCNC: 4.2 MMOL/L — SIGNIFICANT CHANGE UP (ref 3.5–5.3)
POTASSIUM SERPL-SCNC: 4.2 MMOL/L — SIGNIFICANT CHANGE UP (ref 3.5–5.3)
RBC # BLD: 4.17 M/UL — SIGNIFICANT CHANGE UP (ref 3.8–5.2)
RBC # BLD: 4.17 M/UL — SIGNIFICANT CHANGE UP (ref 3.8–5.2)
RBC # FLD: 14.2 % — SIGNIFICANT CHANGE UP (ref 10.3–14.5)
RBC # FLD: 14.2 % — SIGNIFICANT CHANGE UP (ref 10.3–14.5)
SODIUM SERPL-SCNC: 138 MMOL/L — SIGNIFICANT CHANGE UP (ref 135–145)
SODIUM SERPL-SCNC: 138 MMOL/L — SIGNIFICANT CHANGE UP (ref 135–145)
WBC # BLD: 3.75 K/UL — LOW (ref 3.8–10.5)
WBC # BLD: 3.75 K/UL — LOW (ref 3.8–10.5)
WBC # FLD AUTO: 3.75 K/UL — LOW (ref 3.8–10.5)
WBC # FLD AUTO: 3.75 K/UL — LOW (ref 3.8–10.5)

## 2023-11-12 PROCEDURE — 99232 SBSQ HOSP IP/OBS MODERATE 35: CPT

## 2023-11-12 PROCEDURE — 71045 X-RAY EXAM CHEST 1 VIEW: CPT | Mod: 26

## 2023-11-12 RX ORDER — MAGNESIUM HYDROXIDE 400 MG/1
30 TABLET, CHEWABLE ORAL DAILY
Refills: 0 | Status: DISCONTINUED | OUTPATIENT
Start: 2023-11-12 | End: 2023-11-14

## 2023-11-12 RX ORDER — SODIUM CHLORIDE 9 MG/ML
1000 INJECTION INTRAMUSCULAR; INTRAVENOUS; SUBCUTANEOUS
Refills: 0 | Status: DISCONTINUED | OUTPATIENT
Start: 2023-11-12 | End: 2023-11-13

## 2023-11-12 RX ADMIN — Medication 20 MILLIGRAM(S): at 22:05

## 2023-11-12 RX ADMIN — Medication 3 MILLILITER(S): at 08:45

## 2023-11-12 RX ADMIN — Medication 3 MILLILITER(S): at 14:25

## 2023-11-12 RX ADMIN — Medication 2000 UNIT(S): at 22:04

## 2023-11-12 RX ADMIN — Medication 3 MILLIGRAM(S): at 22:07

## 2023-11-12 RX ADMIN — DIVALPROEX SODIUM 125 MILLIGRAM(S): 500 TABLET, DELAYED RELEASE ORAL at 22:04

## 2023-11-12 RX ADMIN — SODIUM CHLORIDE 50 MILLILITER(S): 9 INJECTION INTRAMUSCULAR; INTRAVENOUS; SUBCUTANEOUS at 11:24

## 2023-11-12 RX ADMIN — Medication 10 MILLIGRAM(S): at 22:04

## 2023-11-12 RX ADMIN — Medication 3 MILLILITER(S): at 01:29

## 2023-11-12 RX ADMIN — Medication 20 MILLIGRAM(S): at 10:14

## 2023-11-12 RX ADMIN — BUDESONIDE AND FORMOTEROL FUMARATE DIHYDRATE 2 PUFF(S): 160; 4.5 AEROSOL RESPIRATORY (INHALATION) at 20:40

## 2023-11-12 RX ADMIN — Medication 5 MILLIGRAM(S): at 22:04

## 2023-11-12 RX ADMIN — BUDESONIDE AND FORMOTEROL FUMARATE DIHYDRATE 2 PUFF(S): 160; 4.5 AEROSOL RESPIRATORY (INHALATION) at 08:46

## 2023-11-12 RX ADMIN — QUETIAPINE FUMARATE 12.5 MILLIGRAM(S): 200 TABLET, FILM COATED ORAL at 22:34

## 2023-11-12 RX ADMIN — Medication 3 MILLILITER(S): at 20:40

## 2023-11-12 RX ADMIN — CEFTRIAXONE 1000 MILLIGRAM(S): 500 INJECTION, POWDER, FOR SOLUTION INTRAMUSCULAR; INTRAVENOUS at 16:13

## 2023-11-13 LAB
ALBUMIN SERPL ELPH-MCNC: 3 G/DL — LOW (ref 3.3–5)
ALBUMIN SERPL ELPH-MCNC: 3 G/DL — LOW (ref 3.3–5)
ALP SERPL-CCNC: 68 U/L — SIGNIFICANT CHANGE UP (ref 40–120)
ALP SERPL-CCNC: 68 U/L — SIGNIFICANT CHANGE UP (ref 40–120)
ALT FLD-CCNC: 31 U/L — SIGNIFICANT CHANGE UP (ref 12–78)
ALT FLD-CCNC: 31 U/L — SIGNIFICANT CHANGE UP (ref 12–78)
ANION GAP SERPL CALC-SCNC: 7 MMOL/L — SIGNIFICANT CHANGE UP (ref 5–17)
ANION GAP SERPL CALC-SCNC: 7 MMOL/L — SIGNIFICANT CHANGE UP (ref 5–17)
AST SERPL-CCNC: 22 U/L — SIGNIFICANT CHANGE UP (ref 15–37)
AST SERPL-CCNC: 22 U/L — SIGNIFICANT CHANGE UP (ref 15–37)
BASOPHILS # BLD AUTO: 0 K/UL — SIGNIFICANT CHANGE UP (ref 0–0.2)
BASOPHILS # BLD AUTO: 0 K/UL — SIGNIFICANT CHANGE UP (ref 0–0.2)
BASOPHILS NFR BLD AUTO: 0 % — SIGNIFICANT CHANGE UP (ref 0–2)
BASOPHILS NFR BLD AUTO: 0 % — SIGNIFICANT CHANGE UP (ref 0–2)
BILIRUB SERPL-MCNC: 0.4 MG/DL — SIGNIFICANT CHANGE UP (ref 0.2–1.2)
BILIRUB SERPL-MCNC: 0.4 MG/DL — SIGNIFICANT CHANGE UP (ref 0.2–1.2)
BUN SERPL-MCNC: 38 MG/DL — HIGH (ref 7–23)
BUN SERPL-MCNC: 38 MG/DL — HIGH (ref 7–23)
CALCIUM SERPL-MCNC: 9.6 MG/DL — SIGNIFICANT CHANGE UP (ref 8.5–10.1)
CALCIUM SERPL-MCNC: 9.6 MG/DL — SIGNIFICANT CHANGE UP (ref 8.5–10.1)
CHLORIDE SERPL-SCNC: 103 MMOL/L — SIGNIFICANT CHANGE UP (ref 96–108)
CHLORIDE SERPL-SCNC: 103 MMOL/L — SIGNIFICANT CHANGE UP (ref 96–108)
CO2 SERPL-SCNC: 27 MMOL/L — SIGNIFICANT CHANGE UP (ref 22–31)
CO2 SERPL-SCNC: 27 MMOL/L — SIGNIFICANT CHANGE UP (ref 22–31)
CREAT SERPL-MCNC: 0.66 MG/DL — SIGNIFICANT CHANGE UP (ref 0.5–1.3)
CREAT SERPL-MCNC: 0.66 MG/DL — SIGNIFICANT CHANGE UP (ref 0.5–1.3)
CRP SERPL-MCNC: <3 MG/L — SIGNIFICANT CHANGE UP
CRP SERPL-MCNC: <3 MG/L — SIGNIFICANT CHANGE UP
CULTURE RESULTS: SIGNIFICANT CHANGE UP
EGFR: 85 ML/MIN/1.73M2 — SIGNIFICANT CHANGE UP
EGFR: 85 ML/MIN/1.73M2 — SIGNIFICANT CHANGE UP
ELLIPTOCYTES BLD QL SMEAR: SLIGHT — SIGNIFICANT CHANGE UP
ELLIPTOCYTES BLD QL SMEAR: SLIGHT — SIGNIFICANT CHANGE UP
EOSINOPHIL # BLD AUTO: 0 K/UL — SIGNIFICANT CHANGE UP (ref 0–0.5)
EOSINOPHIL # BLD AUTO: 0 K/UL — SIGNIFICANT CHANGE UP (ref 0–0.5)
EOSINOPHIL NFR BLD AUTO: 0 % — SIGNIFICANT CHANGE UP (ref 0–6)
EOSINOPHIL NFR BLD AUTO: 0 % — SIGNIFICANT CHANGE UP (ref 0–6)
GLUCOSE SERPL-MCNC: 141 MG/DL — HIGH (ref 70–99)
GLUCOSE SERPL-MCNC: 141 MG/DL — HIGH (ref 70–99)
HCT VFR BLD CALC: 39.1 % — SIGNIFICANT CHANGE UP (ref 34.5–45)
HCT VFR BLD CALC: 39.1 % — SIGNIFICANT CHANGE UP (ref 34.5–45)
HGB BLD-MCNC: 12.8 G/DL — SIGNIFICANT CHANGE UP (ref 11.5–15.5)
HGB BLD-MCNC: 12.8 G/DL — SIGNIFICANT CHANGE UP (ref 11.5–15.5)
LYMPHOCYTES # BLD AUTO: 0.57 K/UL — LOW (ref 1–3.3)
LYMPHOCYTES # BLD AUTO: 0.57 K/UL — LOW (ref 1–3.3)
LYMPHOCYTES # BLD AUTO: 16 % — SIGNIFICANT CHANGE UP (ref 13–44)
LYMPHOCYTES # BLD AUTO: 16 % — SIGNIFICANT CHANGE UP (ref 13–44)
MAGNESIUM SERPL-MCNC: 2.2 MG/DL — SIGNIFICANT CHANGE UP (ref 1.6–2.6)
MAGNESIUM SERPL-MCNC: 2.2 MG/DL — SIGNIFICANT CHANGE UP (ref 1.6–2.6)
MANUAL SMEAR VERIFICATION: SIGNIFICANT CHANGE UP
MANUAL SMEAR VERIFICATION: SIGNIFICANT CHANGE UP
MCHC RBC-ENTMCNC: 30.8 PG — SIGNIFICANT CHANGE UP (ref 27–34)
MCHC RBC-ENTMCNC: 30.8 PG — SIGNIFICANT CHANGE UP (ref 27–34)
MCHC RBC-ENTMCNC: 32.7 GM/DL — SIGNIFICANT CHANGE UP (ref 32–36)
MCHC RBC-ENTMCNC: 32.7 GM/DL — SIGNIFICANT CHANGE UP (ref 32–36)
MCV RBC AUTO: 94 FL — SIGNIFICANT CHANGE UP (ref 80–100)
MCV RBC AUTO: 94 FL — SIGNIFICANT CHANGE UP (ref 80–100)
MONOCYTES # BLD AUTO: 0.07 K/UL — SIGNIFICANT CHANGE UP (ref 0–0.9)
MONOCYTES # BLD AUTO: 0.07 K/UL — SIGNIFICANT CHANGE UP (ref 0–0.9)
MONOCYTES NFR BLD AUTO: 2 % — SIGNIFICANT CHANGE UP (ref 2–14)
MONOCYTES NFR BLD AUTO: 2 % — SIGNIFICANT CHANGE UP (ref 2–14)
NEUTROPHILS # BLD AUTO: 2.88 K/UL — SIGNIFICANT CHANGE UP (ref 1.8–7.4)
NEUTROPHILS # BLD AUTO: 2.88 K/UL — SIGNIFICANT CHANGE UP (ref 1.8–7.4)
NEUTROPHILS NFR BLD AUTO: 81 % — HIGH (ref 43–77)
NEUTROPHILS NFR BLD AUTO: 81 % — HIGH (ref 43–77)
NRBC # BLD: 0 /100 — SIGNIFICANT CHANGE UP (ref 0–0)
NRBC # BLD: 0 /100 — SIGNIFICANT CHANGE UP (ref 0–0)
NRBC # BLD: SIGNIFICANT CHANGE UP /100 WBCS (ref 0–0)
NRBC # BLD: SIGNIFICANT CHANGE UP /100 WBCS (ref 0–0)
OVALOCYTES BLD QL SMEAR: SIGNIFICANT CHANGE UP
OVALOCYTES BLD QL SMEAR: SIGNIFICANT CHANGE UP
PHOSPHATE SERPL-MCNC: 3.4 MG/DL — SIGNIFICANT CHANGE UP (ref 2.5–4.5)
PHOSPHATE SERPL-MCNC: 3.4 MG/DL — SIGNIFICANT CHANGE UP (ref 2.5–4.5)
PLAT MORPH BLD: NORMAL — SIGNIFICANT CHANGE UP
PLAT MORPH BLD: NORMAL — SIGNIFICANT CHANGE UP
PLATELET # BLD AUTO: 124 K/UL — LOW (ref 150–400)
PLATELET # BLD AUTO: 124 K/UL — LOW (ref 150–400)
POIKILOCYTOSIS BLD QL AUTO: SIGNIFICANT CHANGE UP
POIKILOCYTOSIS BLD QL AUTO: SIGNIFICANT CHANGE UP
POTASSIUM SERPL-MCNC: 4.3 MMOL/L — SIGNIFICANT CHANGE UP (ref 3.5–5.3)
POTASSIUM SERPL-MCNC: 4.3 MMOL/L — SIGNIFICANT CHANGE UP (ref 3.5–5.3)
POTASSIUM SERPL-SCNC: 4.3 MMOL/L — SIGNIFICANT CHANGE UP (ref 3.5–5.3)
POTASSIUM SERPL-SCNC: 4.3 MMOL/L — SIGNIFICANT CHANGE UP (ref 3.5–5.3)
PROT SERPL-MCNC: 6.4 GM/DL — SIGNIFICANT CHANGE UP (ref 6–8.3)
PROT SERPL-MCNC: 6.4 GM/DL — SIGNIFICANT CHANGE UP (ref 6–8.3)
RBC # BLD: 4.16 M/UL — SIGNIFICANT CHANGE UP (ref 3.8–5.2)
RBC # BLD: 4.16 M/UL — SIGNIFICANT CHANGE UP (ref 3.8–5.2)
RBC # FLD: 14.3 % — SIGNIFICANT CHANGE UP (ref 10.3–14.5)
RBC # FLD: 14.3 % — SIGNIFICANT CHANGE UP (ref 10.3–14.5)
RBC BLD AUTO: ABNORMAL
RBC BLD AUTO: ABNORMAL
SODIUM SERPL-SCNC: 137 MMOL/L — SIGNIFICANT CHANGE UP (ref 135–145)
SODIUM SERPL-SCNC: 137 MMOL/L — SIGNIFICANT CHANGE UP (ref 135–145)
SPECIMEN SOURCE: SIGNIFICANT CHANGE UP
VARIANT LYMPHS # BLD: 1 % — SIGNIFICANT CHANGE UP (ref 0–6)
VARIANT LYMPHS # BLD: 1 % — SIGNIFICANT CHANGE UP (ref 0–6)
WBC # BLD: 3.55 K/UL — LOW (ref 3.8–10.5)
WBC # BLD: 3.55 K/UL — LOW (ref 3.8–10.5)
WBC # FLD AUTO: 3.55 K/UL — LOW (ref 3.8–10.5)
WBC # FLD AUTO: 3.55 K/UL — LOW (ref 3.8–10.5)

## 2023-11-13 PROCEDURE — 99232 SBSQ HOSP IP/OBS MODERATE 35: CPT

## 2023-11-13 PROCEDURE — 74019 RADEX ABDOMEN 2 VIEWS: CPT | Mod: 26

## 2023-11-13 RX ORDER — POLYETHYLENE GLYCOL 3350 17 G/17G
17 POWDER, FOR SOLUTION ORAL DAILY
Refills: 0 | Status: DISCONTINUED | OUTPATIENT
Start: 2023-11-13 | End: 2023-11-14

## 2023-11-13 RX ADMIN — Medication 5 MILLIGRAM(S): at 10:34

## 2023-11-13 RX ADMIN — Medication 3 MILLILITER(S): at 08:24

## 2023-11-13 RX ADMIN — Medication 10 MILLIGRAM(S): at 06:06

## 2023-11-13 RX ADMIN — MAGNESIUM HYDROXIDE 30 MILLILITER(S): 400 TABLET, CHEWABLE ORAL at 10:33

## 2023-11-13 RX ADMIN — BUDESONIDE AND FORMOTEROL FUMARATE DIHYDRATE 2 PUFF(S): 160; 4.5 AEROSOL RESPIRATORY (INHALATION) at 20:35

## 2023-11-13 RX ADMIN — Medication 3 MILLILITER(S): at 02:44

## 2023-11-13 RX ADMIN — Medication 81 MILLIGRAM(S): at 10:33

## 2023-11-13 RX ADMIN — Medication 2000 UNIT(S): at 21:52

## 2023-11-13 RX ADMIN — DIVALPROEX SODIUM 125 MILLIGRAM(S): 500 TABLET, DELAYED RELEASE ORAL at 21:53

## 2023-11-13 RX ADMIN — PANTOPRAZOLE SODIUM 40 MILLIGRAM(S): 20 TABLET, DELAYED RELEASE ORAL at 06:49

## 2023-11-13 RX ADMIN — BUDESONIDE AND FORMOTEROL FUMARATE DIHYDRATE 2 PUFF(S): 160; 4.5 AEROSOL RESPIRATORY (INHALATION) at 08:26

## 2023-11-13 RX ADMIN — Medication 10 MILLIGRAM(S): at 21:51

## 2023-11-13 RX ADMIN — Medication 20 MILLIGRAM(S): at 10:33

## 2023-11-13 RX ADMIN — Medication 3 MILLILITER(S): at 20:35

## 2023-11-13 RX ADMIN — LOSARTAN POTASSIUM 100 MILLIGRAM(S): 100 TABLET, FILM COATED ORAL at 10:33

## 2023-11-13 RX ADMIN — DIVALPROEX SODIUM 125 MILLIGRAM(S): 500 TABLET, DELAYED RELEASE ORAL at 10:33

## 2023-11-13 RX ADMIN — Medication 5 MILLIGRAM(S): at 21:52

## 2023-11-13 RX ADMIN — QUETIAPINE FUMARATE 12.5 MILLIGRAM(S): 200 TABLET, FILM COATED ORAL at 21:53

## 2023-11-13 RX ADMIN — Medication 3 MILLILITER(S): at 14:23

## 2023-11-13 NOTE — PROGRESS NOTE ADULT - NUTRITIONAL ASSESSMENT
This patient has been assessed with a concern for Malnutrition and has been determined to have a diagnosis/diagnoses of Severe protein-calorie malnutrition and Underweight (BMI < 19).    This patient is being managed with:   Diet Soft and Bite Sized-  Entered: Nov 10 2023  5:31PM  
This patient has been assessed with a concern for Malnutrition and has been determined to have a diagnosis/diagnoses of Severe protein-calorie malnutrition and Underweight (BMI < 19).    This patient is being managed with:   Diet Soft and Bite Sized-  Entered: Nov 10 2023  5:31PM  
This patient has been assessed with a concern for Malnutrition and has been determined to have a diagnosis/diagnoses of Severe protein-calorie malnutrition and Underweight (BMI < 19).    This patient is being managed with:   Diet Regular-  Entered: Nov 8 2023  9:24AM  
This patient has been assessed with a concern for Malnutrition and has been determined to have a diagnosis/diagnoses of Severe protein-calorie malnutrition and Underweight (BMI < 19).    This patient is being managed with:   Diet Soft and Bite Sized-  Entered: Nov 10 2023  5:31PM  
This patient has been assessed with a concern for Malnutrition and has been determined to have a diagnosis/diagnoses of Severe protein-calorie malnutrition and Underweight (BMI < 19).    This patient is being managed with:   Diet Regular-  Entered: Nov 8 2023  9:24AM

## 2023-11-14 ENCOUNTER — TRANSCRIPTION ENCOUNTER (OUTPATIENT)
Age: 88
End: 2023-11-14

## 2023-11-14 VITALS
SYSTOLIC BLOOD PRESSURE: 117 MMHG | TEMPERATURE: 97 F | DIASTOLIC BLOOD PRESSURE: 58 MMHG | HEART RATE: 87 BPM | RESPIRATION RATE: 18 BRPM | OXYGEN SATURATION: 96 %

## 2023-11-14 LAB
ALBUMIN SERPL ELPH-MCNC: 3 G/DL — LOW (ref 3.3–5)
ALBUMIN SERPL ELPH-MCNC: 3 G/DL — LOW (ref 3.3–5)
ALP SERPL-CCNC: 75 U/L — SIGNIFICANT CHANGE UP (ref 40–120)
ALP SERPL-CCNC: 75 U/L — SIGNIFICANT CHANGE UP (ref 40–120)
ALT FLD-CCNC: 60 U/L — SIGNIFICANT CHANGE UP (ref 12–78)
ALT FLD-CCNC: 60 U/L — SIGNIFICANT CHANGE UP (ref 12–78)
ANION GAP SERPL CALC-SCNC: 5 MMOL/L — SIGNIFICANT CHANGE UP (ref 5–17)
ANION GAP SERPL CALC-SCNC: 5 MMOL/L — SIGNIFICANT CHANGE UP (ref 5–17)
AST SERPL-CCNC: 40 U/L — HIGH (ref 15–37)
AST SERPL-CCNC: 40 U/L — HIGH (ref 15–37)
BASOPHILS # BLD AUTO: 0 K/UL — SIGNIFICANT CHANGE UP (ref 0–0.2)
BASOPHILS # BLD AUTO: 0 K/UL — SIGNIFICANT CHANGE UP (ref 0–0.2)
BASOPHILS NFR BLD AUTO: 0 % — SIGNIFICANT CHANGE UP (ref 0–2)
BASOPHILS NFR BLD AUTO: 0 % — SIGNIFICANT CHANGE UP (ref 0–2)
BILIRUB SERPL-MCNC: 0.6 MG/DL — SIGNIFICANT CHANGE UP (ref 0.2–1.2)
BILIRUB SERPL-MCNC: 0.6 MG/DL — SIGNIFICANT CHANGE UP (ref 0.2–1.2)
BUN SERPL-MCNC: 46 MG/DL — HIGH (ref 7–23)
BUN SERPL-MCNC: 46 MG/DL — HIGH (ref 7–23)
CALCIUM SERPL-MCNC: 9.5 MG/DL — SIGNIFICANT CHANGE UP (ref 8.5–10.1)
CALCIUM SERPL-MCNC: 9.5 MG/DL — SIGNIFICANT CHANGE UP (ref 8.5–10.1)
CHLORIDE SERPL-SCNC: 100 MMOL/L — SIGNIFICANT CHANGE UP (ref 96–108)
CHLORIDE SERPL-SCNC: 100 MMOL/L — SIGNIFICANT CHANGE UP (ref 96–108)
CO2 SERPL-SCNC: 30 MMOL/L — SIGNIFICANT CHANGE UP (ref 22–31)
CO2 SERPL-SCNC: 30 MMOL/L — SIGNIFICANT CHANGE UP (ref 22–31)
CREAT SERPL-MCNC: 0.63 MG/DL — SIGNIFICANT CHANGE UP (ref 0.5–1.3)
CREAT SERPL-MCNC: 0.63 MG/DL — SIGNIFICANT CHANGE UP (ref 0.5–1.3)
CRP SERPL-MCNC: <3 MG/L — SIGNIFICANT CHANGE UP
CRP SERPL-MCNC: <3 MG/L — SIGNIFICANT CHANGE UP
EGFR: 86 ML/MIN/1.73M2 — SIGNIFICANT CHANGE UP
EGFR: 86 ML/MIN/1.73M2 — SIGNIFICANT CHANGE UP
EOSINOPHIL # BLD AUTO: 0 K/UL — SIGNIFICANT CHANGE UP (ref 0–0.5)
EOSINOPHIL # BLD AUTO: 0 K/UL — SIGNIFICANT CHANGE UP (ref 0–0.5)
EOSINOPHIL NFR BLD AUTO: 0 % — SIGNIFICANT CHANGE UP (ref 0–6)
EOSINOPHIL NFR BLD AUTO: 0 % — SIGNIFICANT CHANGE UP (ref 0–6)
GLUCOSE SERPL-MCNC: 102 MG/DL — HIGH (ref 70–99)
GLUCOSE SERPL-MCNC: 102 MG/DL — HIGH (ref 70–99)
HCT VFR BLD CALC: 40.3 % — SIGNIFICANT CHANGE UP (ref 34.5–45)
HCT VFR BLD CALC: 40.3 % — SIGNIFICANT CHANGE UP (ref 34.5–45)
HGB BLD-MCNC: 13.2 G/DL — SIGNIFICANT CHANGE UP (ref 11.5–15.5)
HGB BLD-MCNC: 13.2 G/DL — SIGNIFICANT CHANGE UP (ref 11.5–15.5)
IMM GRANULOCYTES NFR BLD AUTO: 0.3 % — SIGNIFICANT CHANGE UP (ref 0–0.9)
IMM GRANULOCYTES NFR BLD AUTO: 0.3 % — SIGNIFICANT CHANGE UP (ref 0–0.9)
LYMPHOCYTES # BLD AUTO: 0.8 K/UL — LOW (ref 1–3.3)
LYMPHOCYTES # BLD AUTO: 0.8 K/UL — LOW (ref 1–3.3)
LYMPHOCYTES # BLD AUTO: 13.1 % — SIGNIFICANT CHANGE UP (ref 13–44)
LYMPHOCYTES # BLD AUTO: 13.1 % — SIGNIFICANT CHANGE UP (ref 13–44)
MAGNESIUM SERPL-MCNC: 2.4 MG/DL — SIGNIFICANT CHANGE UP (ref 1.6–2.6)
MAGNESIUM SERPL-MCNC: 2.4 MG/DL — SIGNIFICANT CHANGE UP (ref 1.6–2.6)
MCHC RBC-ENTMCNC: 30.6 PG — SIGNIFICANT CHANGE UP (ref 27–34)
MCHC RBC-ENTMCNC: 30.6 PG — SIGNIFICANT CHANGE UP (ref 27–34)
MCHC RBC-ENTMCNC: 32.8 GM/DL — SIGNIFICANT CHANGE UP (ref 32–36)
MCHC RBC-ENTMCNC: 32.8 GM/DL — SIGNIFICANT CHANGE UP (ref 32–36)
MCV RBC AUTO: 93.5 FL — SIGNIFICANT CHANGE UP (ref 80–100)
MCV RBC AUTO: 93.5 FL — SIGNIFICANT CHANGE UP (ref 80–100)
MONOCYTES # BLD AUTO: 0.84 K/UL — SIGNIFICANT CHANGE UP (ref 0–0.9)
MONOCYTES # BLD AUTO: 0.84 K/UL — SIGNIFICANT CHANGE UP (ref 0–0.9)
MONOCYTES NFR BLD AUTO: 13.7 % — SIGNIFICANT CHANGE UP (ref 2–14)
MONOCYTES NFR BLD AUTO: 13.7 % — SIGNIFICANT CHANGE UP (ref 2–14)
NEUTROPHILS # BLD AUTO: 4.46 K/UL — SIGNIFICANT CHANGE UP (ref 1.8–7.4)
NEUTROPHILS # BLD AUTO: 4.46 K/UL — SIGNIFICANT CHANGE UP (ref 1.8–7.4)
NEUTROPHILS NFR BLD AUTO: 72.9 % — SIGNIFICANT CHANGE UP (ref 43–77)
NEUTROPHILS NFR BLD AUTO: 72.9 % — SIGNIFICANT CHANGE UP (ref 43–77)
PHOSPHATE SERPL-MCNC: 2.5 MG/DL — SIGNIFICANT CHANGE UP (ref 2.5–4.5)
PHOSPHATE SERPL-MCNC: 2.5 MG/DL — SIGNIFICANT CHANGE UP (ref 2.5–4.5)
PLATELET # BLD AUTO: 129 K/UL — LOW (ref 150–400)
PLATELET # BLD AUTO: 129 K/UL — LOW (ref 150–400)
POTASSIUM SERPL-MCNC: 4.4 MMOL/L — SIGNIFICANT CHANGE UP (ref 3.5–5.3)
POTASSIUM SERPL-MCNC: 4.4 MMOL/L — SIGNIFICANT CHANGE UP (ref 3.5–5.3)
POTASSIUM SERPL-SCNC: 4.4 MMOL/L — SIGNIFICANT CHANGE UP (ref 3.5–5.3)
POTASSIUM SERPL-SCNC: 4.4 MMOL/L — SIGNIFICANT CHANGE UP (ref 3.5–5.3)
PROT SERPL-MCNC: 6.5 GM/DL — SIGNIFICANT CHANGE UP (ref 6–8.3)
PROT SERPL-MCNC: 6.5 GM/DL — SIGNIFICANT CHANGE UP (ref 6–8.3)
RBC # BLD: 4.31 M/UL — SIGNIFICANT CHANGE UP (ref 3.8–5.2)
RBC # BLD: 4.31 M/UL — SIGNIFICANT CHANGE UP (ref 3.8–5.2)
RBC # FLD: 14 % — SIGNIFICANT CHANGE UP (ref 10.3–14.5)
RBC # FLD: 14 % — SIGNIFICANT CHANGE UP (ref 10.3–14.5)
SODIUM SERPL-SCNC: 135 MMOL/L — SIGNIFICANT CHANGE UP (ref 135–145)
SODIUM SERPL-SCNC: 135 MMOL/L — SIGNIFICANT CHANGE UP (ref 135–145)
WBC # BLD: 6.12 K/UL — SIGNIFICANT CHANGE UP (ref 3.8–10.5)
WBC # BLD: 6.12 K/UL — SIGNIFICANT CHANGE UP (ref 3.8–10.5)
WBC # FLD AUTO: 6.12 K/UL — SIGNIFICANT CHANGE UP (ref 3.8–10.5)
WBC # FLD AUTO: 6.12 K/UL — SIGNIFICANT CHANGE UP (ref 3.8–10.5)

## 2023-11-14 PROCEDURE — 99239 HOSP IP/OBS DSCHRG MGMT >30: CPT

## 2023-11-14 RX ORDER — BUDESONIDE AND FORMOTEROL FUMARATE DIHYDRATE 160; 4.5 UG/1; UG/1
2 AEROSOL RESPIRATORY (INHALATION)
Qty: 0 | Refills: 0 | DISCHARGE
Start: 2023-11-14

## 2023-11-14 RX ORDER — ALBUTEROL 90 UG/1
2 AEROSOL, METERED ORAL
Qty: 0 | Refills: 0 | DISCHARGE
Start: 2023-11-14

## 2023-11-14 RX ADMIN — LOSARTAN POTASSIUM 100 MILLIGRAM(S): 100 TABLET, FILM COATED ORAL at 10:41

## 2023-11-14 RX ADMIN — Medication 25 MILLIGRAM(S): at 10:41

## 2023-11-14 RX ADMIN — Medication 3 MILLILITER(S): at 01:30

## 2023-11-14 RX ADMIN — Medication 3 MILLILITER(S): at 14:22

## 2023-11-14 RX ADMIN — PANTOPRAZOLE SODIUM 40 MILLIGRAM(S): 20 TABLET, DELAYED RELEASE ORAL at 05:15

## 2023-11-14 RX ADMIN — DIVALPROEX SODIUM 125 MILLIGRAM(S): 500 TABLET, DELAYED RELEASE ORAL at 10:41

## 2023-11-14 RX ADMIN — MAGNESIUM HYDROXIDE 30 MILLILITER(S): 400 TABLET, CHEWABLE ORAL at 10:41

## 2023-11-14 RX ADMIN — Medication 10 MILLIGRAM(S): at 14:23

## 2023-11-14 RX ADMIN — Medication 5 MILLIGRAM(S): at 10:42

## 2023-11-14 RX ADMIN — Medication 20 MILLIGRAM(S): at 10:41

## 2023-11-14 RX ADMIN — Medication 10 MILLIGRAM(S): at 05:15

## 2023-11-14 RX ADMIN — Medication 3 MILLILITER(S): at 08:35

## 2023-11-14 RX ADMIN — Medication 81 MILLIGRAM(S): at 10:41

## 2023-11-14 RX ADMIN — POLYETHYLENE GLYCOL 3350 17 GRAM(S): 17 POWDER, FOR SOLUTION ORAL at 10:42

## 2023-11-14 RX ADMIN — BUDESONIDE AND FORMOTEROL FUMARATE DIHYDRATE 2 PUFF(S): 160; 4.5 AEROSOL RESPIRATORY (INHALATION) at 08:37

## 2023-11-14 NOTE — DISCHARGE NOTE PROVIDER - HOSPITAL COURSE
88 y/o F with PMH A fib, dementia (A+Ox1 to 2), HTN, GERD, right hip fracture s/p closed reduction and percutaneous pinning of right hip on 3/29/23, severe MR, severe pulmonary HTN, 4+ TR  was sent from [facility]  on 11/8/23 with cough and Dyspnea    Dyspnea   and cough due to  RhinoViral bronchospasm /asthmatic bronchitis with underlying Severe pulmonary HTN   -CT chest No evidence of PNA or CHF   -solumedrol 20mg IV Q8H--> q12h--> taper to po prednisone 20 mg x 3 more days d/w Dr. Mcleod  - Albuterol,  symbicort  -Pulm consult  - CXR 11/12 - clear     E coli bacteruria  due to E coli UTI   - f/u urine culture Culture Results:   - 10,000 - 49,000 CFU/mL Escherichia coli (11.07.23 @ 21:40)  - s/p  ceftriaxone  complete 3 days    Urinary retention s/p Johnson   - bethanechol 10 tid  - bladder scans   - monitor voiding     Constipation persist   - dulcolax bid,  MOM qd  add miralax  - abd Xray - pending    Hyponatremia, improving   -FU BMP STAT  -S/P IV lasix in the ER, patient at this time is not in fluid overload - hold for now   -Encourage PO Intake   -Monitor NA levels   - Na 124--> 131 --> 134--> 135    Chronic A fib on ASA  Severe MR, severe pulmonary HTN   -rate controlled AFIB   -continue metoprolol and ASA  - BNP 4000--> 5000    Depression and dementia (AAOx2)  -continue home meds  -Fall precautions   -Aspiration Precautions    Weakness   - replace vit D   - will need CHARLENE     Dysphagia - soft and bite size with thin liquids food    VTE  -SCDS    DNR/DNI, No FT    Dispo - patients  Laz Barajas [phone] is at [facility] and has Parkinsons Disease;   he has given me permission to update his niece now and going forward on her clinical status Bernadette Laisha [phone]

## 2023-11-14 NOTE — PROGRESS NOTE ADULT - ASSESSMENT
86 y/o F with PMH A fib, dementia (A+Ox1 to 2), HTN, GERD, right hip fracture s/p closed reduction and percutaneous pinning of right hip on 3/29/23, severe MR, severe pulmonary HTN, 4+ TR  was sent from [facility]  on 11/8/23 with cough and Dyspnea    Dyspnea   and cough due to  RhinoViral bronchospasm /asthmatic bronchitis with underlying Severe pulmonary HTN   -CT chest No evidence of PNA or CHF   -solumedrol 20mg IV Q8H  -ALbuterol, symbicort  -Pulm consult    E coli bacteruria probable UTI   - f/u urine culture Culture Results:   - 10,000 - 49,000 CFU/mL Escherichia coli (11.07.23 @ 21:40)  - c/w ceftriaxone     Hyponatremia, improving   -FU BMP STAT  -S/P IV lasix in the ER, patient at this time is not in fluid overload - hold for now   -Encourage PO Intake   -Monitor NA levels   - Na 124--> 131 --> 134    Chronic A fib on ASA  Severe MR, severe pulmonary HTN   -rate controlled AFIB   -continue metoprolol and ASA  - BNP 4000--> 5000    Depression and dementia (AAOx2)  -continue home meds  -Fall precautions   -Aspiration Precautions    Weakness   - replace vit D   - will need CHARLENE     VTE  -SCDS    DNR/DNI, No FT    Dispo - patients  Laz Barajas [phone] is at [facility] and has Parkinsons Disease;   he has given me permission to update his niece now and going forward on her clinical status Bernadette Interiano [phone]    niece Bernadette Interiano [phone] updated 11/9   
86 y/o F with PMH A fib, dementia (A+Ox1 to 2), HTN, GERD, right hip fracture s/p closed reduction and percutaneous pinning of right hip on 3/29/23, severe MR, severe pulmonary HTN, 4+ TR  was sent from [facility]  on 11/8/23 with cough and Dyspnea    Dyspnea   and cough due to  RhinoViral bronchospasm /asthmatic bronchitis with underlying Severe pulmonary HTN   -CT chest No evidence of PNA or CHF   -solumedrol 20mg IV Q8H--> q12h  - Albuterol,  symbicort  -Pulm consult    E coli bacteruria probable UTI   - f/u urine culture Culture Results:   - 10,000 - 49,000 CFU/mL Escherichia coli (11.07.23 @ 21:40)  - c/w ceftriaxone     Urinary retention  - bethanechol 10 tid  - bladder scans   - monitor voiding     Hyponatremia, improving   -FU BMP STAT  -S/P IV lasix in the ER, patient at this time is not in fluid overload - hold for now   -Encourage PO Intake   -Monitor NA levels   - Na 124--> 131 --> 134--> 135    Chronic A fib on ASA  Severe MR, severe pulmonary HTN   -rate controlled AFIB   -continue metoprolol and ASA  - BNP 4000--> 5000    Depression and dementia (AAOx2)  -continue home meds  -Fall precautions   -Aspiration Precautions    Weakness   - replace vit D   - will need CHARLENE     Dysphagia - soft and bite size with thin liquids food    VTE  -SCDS    DNR/DNI, No FT    Dispo - patients  Laz Barajas [phone] is at [facility] and has Parkinsons Disease;   he has given me permission to update his niece now and going forward on her clinical status Bernadette Laisha [phone]    niece Bernadette Laisha [phone] updated 11/9   
   88 y/o F with PMH A fib, dementia (A+Ox1 to 2), HTN, GERD, right hip fracture s/p closed reduction and percutaneous pinning of right hip on 3/29/23, severe MR, severe pulmonary HTN, 4+ TR  was sent from [facility]  on 11/8/23 with cough and Dyspnea    Dyspnea   and cough due to  RhinoViral bronchospasm /asthmatic bronchitis with underlying Severe pulmonary HTN   -CT chest No evidence of PNA or CHF   -solumedrol 20mg IV Q8H--> q12h--> taper to po prednisone 20 mg x 3 more days d/w Dr. Mcleod  - Albuterol,  symbicort  -Pulm consult  - CXR 11/12 - clear     E coli bacteruria  due to E coli UTI   - f/u urine culture Culture Results:   - 10,000 - 49,000 CFU/mL Escherichia coli (11.07.23 @ 21:40)  - s/p  ceftriaxone  complete 3 days    Urinary retention s/p Johnson   - bethanechol 10 tid  - bladder scans   - monitor voiding     Constipation persist   - dulcolax bid,  MOM qd  add miralax  - abd Xray - pending    Hyponatremia, improving   -FU BMP STAT  -S/P IV lasix in the ER, patient at this time is not in fluid overload - hold for now   -Encourage PO Intake   -Monitor NA levels   - Na 124--> 131 --> 134--> 135    Chronic A fib on ASA  Severe MR, severe pulmonary HTN   -rate controlled AFIB   -continue metoprolol and ASA  - BNP 4000--> 5000    Depression and dementia (AAOx2)  -continue home meds  -Fall precautions   -Aspiration Precautions    Weakness   - replace vit D   - will need CHARLENE     Dysphagia - soft and bite size with thin liquids food    VTE  -SCDS    DNR/DNI, No FT    Dispo - patients  Laz Barajas [phone] is at [facility] and has Parkinsons Disease;   he has given me permission to update his niece now and going forward on her clinical status Bernadette Laisha [phone]    niece Bernadette Interiano [phone] updated 11/9 , 11/13     Dispo - follow up abd xray, monitor BM, d/c planning when stable       
86 y/o F with PMH A fib, dementia (A+Ox1 to 2), HTN, GERD, right hip fracture s/p closed reduction and percutaneous pinning of right hip on 3/29/23, severe MR, severe pulmonary HTN, 4+ TR  was sent from [facility]  on 11/8/23 with cough and Dyspnea    Dyspnea   and cough due to  RhinoViral bronchospasm /asthmatic bronchitis with underlying Severe pulmonary HTN   -CT chest No evidence of PNA or CHF   -solumedrol 20mg IV Q8H--> q12h  - Albuterol,  symbicort  -Pulm consult  - CXR 11/12 - clear     E coli bacteruria  due to E coli UTI   - f/u urine culture Culture Results:   - 10,000 - 49,000 CFU/mL Escherichia coli (11.07.23 @ 21:40)  - c/w ceftriaxone  complete 3 days    Urinary retention  - bethanechol 10 tid  - bladder scans   - monitor voiding     Constipation  - dulcolax bid  - MOM qd   - monitor    Hyponatremia, improving   -FU BMP STAT  -S/P IV lasix in the ER, patient at this time is not in fluid overload - hold for now   -Encourage PO Intake   -Monitor NA levels   - Na 124--> 131 --> 134--> 135    Chronic A fib on ASA  Severe MR, severe pulmonary HTN   -rate controlled AFIB   -continue metoprolol and ASA  - BNP 4000--> 5000    Depression and dementia (AAOx2)  -continue home meds  -Fall precautions   -Aspiration Precautions    Weakness   - replace vit D   - will need CHARLENE     Dysphagia - soft and bite size with thin liquids food    VTE  -SCDS    DNR/DNI, No FT    Dispo - patients  Laz Barajas [phone] is at [facility] and has Parkinsons Disease;   he has given me permission to update his niece now and going forward on her clinical status Bernadette Laisha [phone]    niece Bernadette Redmankathy [phone] updated 11/9     
PROBLEMS:    Dyspnea 2ndry to RhinoVirus/ Asthmatic Bronchitis-CT chest No evidence of PNA or CHF  Chronic A fib, severe MR  Severe pulmonary HTN   Depression and dementia (AAOx2)    PLAN:    pulmonary stable  taper IV Solumedrol 20mg IV Q8H  ALbuterol/symbicort  S/P IV lasix-not in fluid overload-hold  Further diuretics  Rate controlled AFIB-continue metoprolol and ASA-continue home meds  Fall precautions   Aspiration Precautions  VTE-SCDS
PROBLEMS:    Dyspnea 2ndry to RhinoVirus/ Asthmatic Bronchitis-CT chest No evidence of PNA or CHF  Chronic A fib, severe MR  Severe pulmonary HTN   Depression and dementia (AAOx2)    PLAN:    pulmonary stable  taper IV Solumedrol 20mg IV Q8H  ALbuterol/symbicort  S/P IV lasix-not in fluid overload-hold  Further diuretics  Rate controlled AFIB-continue metoprolol and ASA-continue home meds  Fall precautions   Aspiration Precautions  VTE-SCDS  
88 y/o F with PMH A fib, dementia (A+Ox1 to 2), HTN, GERD, right hip fracture s/p closed reduction and percutaneous pinning of right hip on 3/29/23, severe MR, severe pulmonary HTN, 4+ TR  was sent from [facility]  on 11/8/23 with cough and Dyspnea    Dyspnea   and cough due to  RhinoViral bronchospasm /asthmatic bronchitis with underlying Severe pulmonary HTN   -CT chest No evidence of PNA or CHF   -solumedrol 20mg IV Q8H  -ALbuterol, symbicort  -Pulm consult    E coli bacteruria probable UTI   - f/u urine culture  - start ceftriaxone     Hyponatremia, improving   -FU BMP STAT  -S/P IV lasix in the ER, patient at this time is not in fluid overload - hold for now   -Encourage PO Intake   -Monitor NA levels   - Na 124--> 131     Chronic A fib on ASA  Severe MR, severe pulmonary HTN   -rate controlled AFIB   -continue metoprolol and ASA  - BNP 4000--> 5000    Depression and dementia (AAOx2)  -continue home meds  -Fall precautions   -Aspiration Precautions    VTE  -SCDS    DNR/DNI, No FT    Dispo - patients  Laz Barajas [phone] is at [facility] and has Parkinsons Disease;   he has given me permission to update his niece now and going forward on her clinical status Bernadette Interiano [phone]    niece Bernadette Interiano [phone] updated 11/9   
PROBLEMS:    Dyspnea 2ndry to RhinoVirus/ Asthmatic Bronchitis-CT chest No evidence of PNA or CHF  Chronic A fib, severe MR  Severe pulmonary HTN   Depression and dementia (AAOx2)    PLAN:    pulmonary stable- decd planning  Po prednisone 20mg daily  ALbuterol/symbicort  S/P IV lasix-not in fluid overload-hold  Further diuretics  Rate controlled AFIB-continue metoprolol and ASA-continue home meds  Fall precautions   Aspiration Precautions  VTE-SCDS

## 2023-11-14 NOTE — PROGRESS NOTE ADULT - REASON FOR ADMISSION
dyspnea and wheezing

## 2023-11-14 NOTE — DISCHARGE NOTE PROVIDER - DETAILS OF MALNUTRITION DIAGNOSIS/DIAGNOSES
This patient has been assessed with a concern for Malnutrition and was treated during this hospitalization for the following Nutrition diagnosis/diagnoses:     -  11/09/2023: Severe protein-calorie malnutrition   -  11/09/2023: Underweight (BMI < 19)

## 2023-11-14 NOTE — DISCHARGE NOTE PROVIDER - NSDCCPCAREPLAN_GEN_ALL_CORE_FT
PRINCIPAL DISCHARGE DIAGNOSIS  Diagnosis: Hyponatremia  Assessment and Plan of Treatment: You were found to have hyponatremia secondary to viral bronchitis. You are doing better. Please continue medications as prescribed.      SECONDARY DISCHARGE DIAGNOSES  Diagnosis: Infection, enterovirus  Assessment and Plan of Treatment:     Diagnosis: Bronchitis with acute wheezing  Assessment and Plan of Treatment:     Diagnosis: Pulmonary HTN  Assessment and Plan of Treatment:

## 2023-11-14 NOTE — DISCHARGE NOTE PROVIDER - NSDCMRMEDTOKEN_GEN_ALL_CORE_FT
acetaminophen 325 mg oral tablet: 2 tab(s) orally every 6 hours as needed for  mild pain  albuterol 90 mcg/inh inhalation aerosol: 2 puff(s) inhaled every 6 hours As needed Shortness of Breath  aspirin 81 mg oral delayed release tablet: 1 tab(s) orally once a day  budesonide-formoterol 160 mcg-4.5 mcg/inh inhalation aerosol: 2 puff(s) inhaled 2 times a day  divalproex sodium 125 mg oral delayed release tablet: 1 tab(s) orally 2 times a day  losartan 100 mg oral tablet: 1 tab(s) orally once a day  metoprolol succinate 25 mg oral tablet, extended release: 1 tab(s) orally once a day  predniSONE 20 mg oral tablet: 1 tab(s) orally once a day For 5 days

## 2023-11-14 NOTE — DISCHARGE NOTE NURSING/CASE MANAGEMENT/SOCIAL WORK - PATIENT PORTAL LINK FT
You can access the FollowMyHealth Patient Portal offered by [facility] by registering at the following website: [email or web address]. By joining [employer]’s FollowMyHealth portal, you will also be able to view your health information using other applications (apps) compatible with our system.

## 2023-11-17 DIAGNOSIS — I34.0 NONRHEUMATIC MITRAL (VALVE) INSUFFICIENCY: ICD-10-CM

## 2023-11-17 DIAGNOSIS — N39.0 URINARY TRACT INFECTION, SITE NOT SPECIFIED: ICD-10-CM

## 2023-11-17 DIAGNOSIS — R06.00 DYSPNEA, UNSPECIFIED: ICD-10-CM

## 2023-11-17 DIAGNOSIS — K21.9 GASTRO-ESOPHAGEAL REFLUX DISEASE WITHOUT ESOPHAGITIS: ICD-10-CM

## 2023-11-17 DIAGNOSIS — E87.1 HYPO-OSMOLALITY AND HYPONATREMIA: ICD-10-CM

## 2023-11-17 DIAGNOSIS — G43.909 MIGRAINE, UNSPECIFIED, NOT INTRACTABLE, WITHOUT STATUS MIGRAINOSUS: ICD-10-CM

## 2023-11-17 DIAGNOSIS — Z79.82 LONG TERM (CURRENT) USE OF ASPIRIN: ICD-10-CM

## 2023-11-17 DIAGNOSIS — K59.00 CONSTIPATION, UNSPECIFIED: ICD-10-CM

## 2023-11-17 DIAGNOSIS — Z66 DO NOT RESUSCITATE: ICD-10-CM

## 2023-11-17 DIAGNOSIS — R82.71 BACTERIURIA: ICD-10-CM

## 2023-11-17 DIAGNOSIS — R33.9 RETENTION OF URINE, UNSPECIFIED: ICD-10-CM

## 2023-11-17 DIAGNOSIS — F03.90 UNSPECIFIED DEMENTIA WITHOUT BEHAVIORAL DISTURBANCE: ICD-10-CM

## 2023-11-17 DIAGNOSIS — I27.20 PULMONARY HYPERTENSION, UNSPECIFIED: ICD-10-CM

## 2023-11-17 DIAGNOSIS — F32.A DEPRESSION, UNSPECIFIED: ICD-10-CM

## 2023-11-17 DIAGNOSIS — R13.10 DYSPHAGIA, UNSPECIFIED: ICD-10-CM

## 2023-11-17 DIAGNOSIS — R53.1 WEAKNESS: ICD-10-CM

## 2023-11-17 DIAGNOSIS — I48.20 CHRONIC ATRIAL FIBRILLATION, UNSPECIFIED: ICD-10-CM

## 2023-11-17 DIAGNOSIS — B96.20 UNSPECIFIED ESCHERICHIA COLI [E. COLI] AS THE CAUSE OF DISEASES CLASSIFIED ELSEWHERE: ICD-10-CM

## 2023-11-17 DIAGNOSIS — I10 ESSENTIAL (PRIMARY) HYPERTENSION: ICD-10-CM

## 2023-11-17 DIAGNOSIS — E43 UNSPECIFIED SEVERE PROTEIN-CALORIE MALNUTRITION: ICD-10-CM

## 2023-11-17 DIAGNOSIS — J20.6 ACUTE BRONCHITIS DUE TO RHINOVIRUS: ICD-10-CM

## 2023-12-16 NOTE — ED ADULT TRIAGE NOTE - CHIEF COMPLAINT QUOTE
Patient presents to the ER from [facility] with complaints of shortness of breath after screaming per EMS. Patient received albuterol for wheezing at facility. Patient confused at baseline.  used

## 2024-09-23 NOTE — PROGRESS NOTE ADULT - SUBJECTIVE AND OBJECTIVE BOX
Subjective:    pat better, sitting in bed, loose cough.    Home Medications:  acetaminophen 325 mg oral tablet: 2 tab(s) orally every 6 hours as needed for  mild pain (08 Nov 2023 00:33)  albuterol 90 mcg/inh inhalation aerosol: 2 puff(s) inhaled every 6 hours As needed Shortness of Breath (14 Nov 2023 10:10)  aspirin 81 mg oral delayed release tablet: 1 tab(s) orally once a day (08 Nov 2023 00:42)  budesonide-formoterol 160 mcg-4.5 mcg/inh inhalation aerosol: 2 puff(s) inhaled 2 times a day (14 Nov 2023 10:10)  divalproex sodium 125 mg oral delayed release tablet: 1 tab(s) orally 2 times a day (08 Nov 2023 00:42)  losartan 100 mg oral tablet: 1 tab(s) orally once a day (08 Nov 2023 00:33)  metoprolol succinate 25 mg oral tablet, extended release: 1 tab(s) orally once a day (08 Nov 2023 00:33)  predniSONE 20 mg oral tablet: 1 tab(s) orally once a day For 5 days (14 Nov 2023 10:10)    MEDICATIONS  (STANDING):  albuterol/ipratropium for Nebulization 3 milliLiter(s) Nebulizer every 6 hours  aspirin enteric coated 81 milliGRAM(s) Oral daily  bethanechol 10 milliGRAM(s) Oral three times a day  bisacodyl 5 milliGRAM(s) Oral every 12 hours  budesonide 160 MICROgram(s)/formoterol 4.5 MICROgram(s) Inhaler 2 Puff(s) Inhalation two times a day  cholecalciferol 2000 Unit(s) Oral at bedtime  divalproex  milliGRAM(s) Oral two times a day  losartan 100 milliGRAM(s) Oral daily  magnesium hydroxide Suspension 30 milliLiter(s) Oral daily  metoprolol succinate ER 25 milliGRAM(s) Oral daily  pantoprazole    Tablet 40 milliGRAM(s) Oral before breakfast  polyethylene glycol 3350 17 Gram(s) Oral daily  predniSONE   Tablet 20 milliGRAM(s) Oral daily    MEDICATIONS  (PRN):  acetaminophen     Tablet .. 650 milliGRAM(s) Oral every 6 hours PRN Temp greater or equal to 38C (100.4F), Mild Pain (1 - 3)  albuterol    90 MICROgram(s) HFA Inhaler 2 Puff(s) Inhalation every 6 hours PRN Shortness of Breath  aluminum hydroxide/magnesium hydroxide/simethicone Suspension 30 milliLiter(s) Oral every 4 hours PRN Dyspepsia  melatonin 3 milliGRAM(s) Oral at bedtime PRN Insomnia  ondansetron Injectable 4 milliGRAM(s) IV Push every 8 hours PRN Nausea and/or Vomiting  QUEtiapine 12.5 milliGRAM(s) Oral every 8 hours PRN agitation      Allergies    penicillin (Anaphylaxis)  Zithromax (Hives)    Intolerances        Vital Signs Last 24 Hrs  T(C): 36.4 (14 Nov 2023 09:17), Max: 36.5 (13 Nov 2023 15:05)  T(F): 97.6 (14 Nov 2023 09:17), Max: 97.7 (13 Nov 2023 15:05)  HR: 81 (14 Nov 2023 09:17) (60 - 105)  BP: 143/65 (14 Nov 2023 09:17) (131/78 - 143/65)  BP(mean): --  RR: 18 (14 Nov 2023 09:17) (17 - 19)  SpO2: 95% (14 Nov 2023 09:17) (93% - 97%)    Parameters below as of 14 Nov 2023 09:17  Patient On (Oxygen Delivery Method): room air          PHYSICAL EXAMINATION:    NECK:  Supple. No lymphadenopathy. Jugular venous pressure not elevated. Carotids equal.   HEART:   The cardiac impulse has a normal quality. Reg., Nl S1 and S2.  There are no murmurs, rubs or gallops noted  CHEST:  Chest rhonchi to auscultation. Normal respiratory effort.  ABDOMEN:  Soft and nontender.   EXTREMITIES:  There is no edema.       LABS:                        13.2   6.12  )-----------( 129      ( 14 Nov 2023 06:55 )             40.3     11-14    135  |  100  |  46<H>  ----------------------------<  102<H>  4.4   |  30  |  0.63    Ca    9.5      14 Nov 2023 06:55  Phos  2.5     11-14  Mg     2.4     11-14    TPro  6.5  /  Alb  3.0<L>  /  TBili  0.6  /  DBili  x   /  AST  40<H>  /  ALT  60  /  AlkPhos  75  11-14      Urinalysis Basic - ( 14 Nov 2023 06:55 )    Color: x / Appearance: x / SG: x / pH: x  Gluc: 102 mg/dL / Ketone: x  / Bili: x / Urobili: x   Blood: x / Protein: x / Nitrite: x   Leuk Esterase: x / RBC: x / WBC x   Sq Epi: x / Non Sq Epi: x / Bacteria: x            
Subjective:  Chief complain : cough, dyspnea  HPI:  86 y/o F with PMH A fib, dementia (A+Ox1 to 2), HTN, GERD, right hip fracture s/p closed reduction and percutaneous pinning of right hip on 3/29/23, severe MR, severe pulmonary HTN, 4+ TR  was sent from [facility]  on 11/8/23 with cough and Dyspnea. patient Denies any CHest pain , plapitations, pain, N/V/D. No the best historian. MOst of the history was obtained from the chart. Discussed plan with patient Niece and .    In the ER patient received IV solumedrol and Albuterol.      11/9 -    Patient seen and examined at bedside earlier today, + cough, + wheezing, denies cp, abdominal pain, poor historian but able to answer questions appropriately   11/10 - afebrile, denies cp, + cough, + wheezing, denies pain, tolerating po intake, plan discussed   11/11 - + cough, + urinary retention, + wheezing, denies cp, abdominal pain, poor historian   11/12 - confusion overnight s/p haldol inj , now very sedated, sleeping, denies cp, dyspnea, abdominal pain , + constipation, + urinary retention  11/13 - less confused , + urinary retention s/p Johnson , no BM, tolerating po intake, afebrile     Review of system- limited due to dementia, no other complains    Vital sings reviewed for last 24 h  T(C): 36.5 (11-13-23 @ 15:05), Max: 36.7 (11-12-23 @ 22:00)  T(F): 97.7 (11-13-23 @ 15:05), Max: 98 (11-12-23 @ 22:00)  HR: 86 (11-13-23 @ 15:05) (60 - 98)  BP: 131/78 (11-13-23 @ 15:05) (111/55 - 150/79)  RR: 17 (11-13-23 @ 15:05) (17 - 18)  SpO2: 93% (11-13-23 @ 15:05) (93% - 96%)      Physical exam:   General : NAD, appear to be of stated age , well groomed   NERVOUS SYSTEM:  Alert & Oriented X3, non- focal exam, Motor Strength 5/5 B/L upper and lower extremities; DTRs 2+ intact and symmetric  HEAD:  Atraumatic, Normocephalic  EYES: EOMI, PERRLA, conjunctiva and sclera clear  HEENT: Moist mucous membranes, Supple neck , No JVD  CHEST: BS decreased at bases  bilaterally; No rales, no rhonchi, +  wheezing  HEART: Regular rate and rhythm; No murmurs, no rubs or gallops  ABDOMEN: Soft, Non-tender, Non-distended; Bowel sounds present, no guarding , no peritoneal irritation   GENITOURINARY- Voiding, no suprapubic tenderness  EXTREMITIES:  2+ Peripheral Pulses, No clubbing, cyanosis,   edema  MUSCULOSKELETAL:- No muscle tenderness, Muscle tone normal, No joint tenderness, no Joint swelling,  Joint ROM –normal   SKIN-no rash, no lesion    Labs radiologic and other test : all reviewed and interpreted :                         12.8   3.55  )-----------( 124      ( 13 Nov 2023 06:36 )             39.1     11-13    137  |  103  |  38<H>  ----------------------------<  141<H>  4.3   |  27  |  0.66    Ca    9.6      13 Nov 2023 06:36  Phos  3.4     11-13  Mg     2.2     11-13    TPro  6.4  /  Alb  3.0<L>  /  TBili  0.4  /  DBili  x   /  AST  22  /  ALT  31  /  AlkPhos  68  11-13        LIVER FUNCTIONS - ( 13 Nov 2023 06:36 )  Alb: 3.0 g/dL / Pro: 6.4 gm/dL / ALK PHOS: 68 U/L / ALT: 31 U/L / AST: 22 U/L / GGT: x               CT Chest No Cont (11.07.23 @ 22:03) >  IMPRESSION:  Previously seen scattered groundglass opacities are not present on the   prior study. No areas of new parenchymal consolidation. Bilateral   dependentatelectasis.    Additional findings as above.    Respiratory Viral Panel with COVID-19 by RYAN (11.07.23 @ 18:45)    Rapid RVP Result: Detected   SARS-CoV-2: NotDetec:    Entero/Rhinovirus (RapRVP): Detected    Culture - Urine (11.07.23 @ 21:40)    Specimen Source: Clean Catch Clean Catch (Midstream)   Culture Results:   10,000 - 49,000 CFU/mL Escherichia coli  Culture - Urine (11.07.23 @ 21:40)    -  Amoxicillin/Clavulanic Acid: S <=8/4   -  Ampicillin: S <=8 These ampicillin results predict results for amoxicillin   -  Ampicillin/Sulbactam: S <=4/2   -  Aztreonam: S <=4   -  Cefazolin: S <=2 For uncomplicated UTI with K. pneumoniae, E. coli, or P. mirablis: TOSHA <=16 is sensitive and TOSHA >=32 is resistant. This also predicts results for oral agents cefaclor, cefdinir, cefpodoxime, cefprozil, cefuroxime axetil, cephalexin and locarbef for uncomplicated UTI. Note that some isolates may be susceptible to these agents while testing resistant to cefazolin.   -  Cefepime: S <=2   -  Cefoxitin: S <=8   -  Ceftriaxone: S <=1   -  Cefuroxime: S <=4   -  Ciprofloxacin: S <=0.25   -  Ertapenem: S <=0.5   -  Gentamicin: S <=2   -  Imipenem: S <=1   -  Levofloxacin: S <=0.5   -  Meropenem: S <=1   -  Nitrofurantoin: S <=32 Should not be used to treat pyelonephritis   -  Piperacillin/Tazobactam: S <=8   -  Tobramycin: S <=2   -  Trimethoprim/Sulfamethoxazole: S <=0.5/9.5   Specimen Source: Clean Catch Clean Catch (Midstream)   Culture Results:   10,000 - 49,000 CFU/mL Escherichia coli  <10,000 CFU/ml Normal Urogenital joan present   Organism Identification: Escherichia coli   Organism: Escherichia coli   Method Type: TOSHA          RECENT CULTURES:      Cardiac testing : reviewed   EKG     12 Lead ECG (11.07.23 @ 15:28) >  Ventricular Rate 79 BPM  QTC Calculation(Bazett) 440 ms  Atrial fibrillation with premature ventricular or aberrantly conducted complexes  Indeterminate axis  Septal infarct (cited on or before 27-MAR-2023)  Abnormal ECG  When compared with ECG of 02-APR-2023 14:00,  T wave inversion less evident in Anterior leads    Procedures :     Devices:     Current medications:  acetaminophen     Tablet .. 650 milliGRAM(s) Oral every 6 hours PRN  albuterol    90 MICROgram(s) HFA Inhaler 2 Puff(s) Inhalation every 6 hours PRN  albuterol/ipratropium for Nebulization 3 milliLiter(s) Nebulizer every 6 hours  aluminum hydroxide/magnesium hydroxide/simethicone Suspension 30 milliLiter(s) Oral every 4 hours PRN  aspirin enteric coated 81 milliGRAM(s) Oral daily  budesonide 160 MICROgram(s)/formoterol 4.5 MICROgram(s) Inhaler 2 Puff(s) Inhalation two times a day  divalproex  milliGRAM(s) Oral two times a day  haloperidol    Injectable 1 milliGRAM(s) IntraMuscular every 12 hours PRN  losartan 100 milliGRAM(s) Oral daily  melatonin 3 milliGRAM(s) Oral at bedtime PRN  methylPREDNISolone sodium succinate Injectable 20 milliGRAM(s) IV Push every 8 hours  metoprolol succinate ER 25 milliGRAM(s) Oral daily  ondansetron Injectable 4 milliGRAM(s) IV Push every 8 hours PRN  pantoprazole    Tablet 40 milliGRAM(s) Oral before breakfast  QUEtiapine 12.5 milliGRAM(s) Oral every 8 hours PRN            
Subjective:  Chief complain : cough, dyspnea  HPI:  86 y/o F with PMH A fib, dementia (A+Ox1 to 2), HTN, GERD, right hip fracture s/p closed reduction and percutaneous pinning of right hip on 3/29/23, severe MR, severe pulmonary HTN, 4+ TR  was sent from [facility]  on 11/8/23 with cough and Dyspnea. patient Denies any CHest pain , plapitations, pain, N/V/D. No the best historian. MOst of the history was obtained from the chart. Discussed plan with patient Niece and .    In the ER patient received IV solumedrol and Albuterol.      11/9 -    Patient seen and examined at bedside earlier today, + cough, + wheezing, denies cp, abdominal pain, poor historian but able to answer questions appropriately     Review of system- Rest of the review of system are negative except mentioned in HPI     Vital sings reviewed for last 24 h  T(C): 36.7 (11-09-23 @ 09:05), Max: 36.7 (11-09-23 @ 09:05)  HR: 90 (11-09-23 @ 14:12) (67 - 108)  BP: 128/75 (11-09-23 @ 09:05) (122/76 - 128/75)  RR: 18 (11-09-23 @ 09:05) (17 - 18)  SpO2: 94% (11-09-23 @ 14:12) (94% - 100%)      Physical exam:   General : NAD, appear to be of stated age , well groomed   NERVOUS SYSTEM:  Alert & Oriented X3, non- focal exam, Motor Strength 5/5 B/L upper and lower extremities; DTRs 2+ intact and symmetric  HEAD:  Atraumatic, Normocephalic  EYES: EOMI, PERRLA, conjunctiva and sclera clear  HEENT: Moist mucous membranes, Supple neck , No JVD  CHEST: BS decreased at bases  bilaterally; No rales, no rhonchi, +  wheezing  HEART: Regular rate and rhythm; No murmurs, no rubs or gallops  ABDOMEN: Soft, Non-tender, Non-distended; Bowel sounds present, no guarding , no peritoneal irritation   GENITOURINARY- Voiding, no suprapubic tenderness  EXTREMITIES:  2+ Peripheral Pulses, No clubbing, cyanosis,   edema  MUSCULOSKELETAL:- No muscle tenderness, Muscle tone normal, No joint tenderness, no Joint swelling,  Joint ROM –normal   SKIN-no rash, no lesion    Labs radiologic and other test : all reviewed and interpreted :                         12.4   5.04  )-----------( 118      ( 09 Nov 2023 06:16 )             36.0     11-09    131<L>  |  98  |  26<H>  ----------------------------<  136<H>  3.9   |  24  |  0.75    Ca    9.4      09 Nov 2023 06:16  Phos  3.9     11-09  Mg     1.9     11-09    TPro  6.5  /  Alb  3.2<L>  /  TBili  0.7  /  DBili  x   /  AST  13<L>  /  ALT  14  /  AlkPhos  79  11-09    CT Chest No Cont (11.07.23 @ 22:03) >  IMPRESSION:  Previously seen scattered groundglass opacities are not present on the   prior study. No areas of new parenchymal consolidation. Bilateral   dependentatelectasis.    Additional findings as above.    Respiratory Viral Panel with COVID-19 by RYAN (11.07.23 @ 18:45)    Rapid RVP Result: Detected   SARS-CoV-2: NotDete:    Entero/Rhinovirus (RapRVP): Detected    Culture - Urine (11.07.23 @ 21:40)    Specimen Source: Clean Catch Clean Catch (Midstream)   Culture Results:   10,000 - 49,000 CFU/mL Escherichia coli      RECENT CULTURES:      Cardiac testing : reviewed   EKG     12 Lead ECG (11.07.23 @ 15:28) >  Ventricular Rate 79 BPM  QTC Calculation(Bazett) 440 ms  Atrial fibrillation with premature ventricular or aberrantly conducted complexes  Indeterminate axis  Septal infarct (cited on or before 27-MAR-2023)  Abnormal ECG  When compared with ECG of 02-APR-2023 14:00,  T wave inversion less evident in Anterior leads    Procedures :     Devices:     Current medications:  acetaminophen     Tablet .. 650 milliGRAM(s) Oral every 6 hours PRN  albuterol    90 MICROgram(s) HFA Inhaler 2 Puff(s) Inhalation every 6 hours PRN  albuterol/ipratropium for Nebulization 3 milliLiter(s) Nebulizer every 6 hours  aluminum hydroxide/magnesium hydroxide/simethicone Suspension 30 milliLiter(s) Oral every 4 hours PRN  aspirin enteric coated 81 milliGRAM(s) Oral daily  budesonide 160 MICROgram(s)/formoterol 4.5 MICROgram(s) Inhaler 2 Puff(s) Inhalation two times a day  divalproex  milliGRAM(s) Oral two times a day  haloperidol    Injectable 1 milliGRAM(s) IntraMuscular every 12 hours PRN  losartan 100 milliGRAM(s) Oral daily  melatonin 3 milliGRAM(s) Oral at bedtime PRN  methylPREDNISolone sodium succinate Injectable 20 milliGRAM(s) IV Push every 8 hours  metoprolol succinate ER 25 milliGRAM(s) Oral daily  ondansetron Injectable 4 milliGRAM(s) IV Push every 8 hours PRN  pantoprazole    Tablet 40 milliGRAM(s) Oral before breakfast  QUEtiapine 12.5 milliGRAM(s) Oral every 8 hours PRN            
Subjective:    pat sitting in the chair in the hallway, no respiratory distress.    Home Medications:  acetaminophen 325 mg oral tablet: 2 tab(s) orally every 6 hours as needed for  mild pain (08 Nov 2023 00:33)  aspirin 81 mg oral delayed release tablet: 1 tab(s) orally once a day (08 Nov 2023 00:42)  divalproex sodium 125 mg oral delayed release tablet: 1 tab(s) orally 2 times a day (08 Nov 2023 00:42)  losartan 100 mg oral tablet: 1 tab(s) orally once a day (08 Nov 2023 00:33)  metoprolol succinate 25 mg oral tablet, extended release: 1 tab(s) orally once a day (08 Nov 2023 00:33)    MEDICATIONS  (STANDING):  albuterol/ipratropium for Nebulization 3 milliLiter(s) Nebulizer every 6 hours  aspirin enteric coated 81 milliGRAM(s) Oral daily  budesonide 160 MICROgram(s)/formoterol 4.5 MICROgram(s) Inhaler 2 Puff(s) Inhalation two times a day  cefTRIAXone Injectable.      divalproex  milliGRAM(s) Oral two times a day  losartan 100 milliGRAM(s) Oral daily  methylPREDNISolone sodium succinate Injectable 20 milliGRAM(s) IV Push every 8 hours  metoprolol succinate ER 25 milliGRAM(s) Oral daily  pantoprazole    Tablet 40 milliGRAM(s) Oral before breakfast    MEDICATIONS  (PRN):  acetaminophen     Tablet .. 650 milliGRAM(s) Oral every 6 hours PRN Temp greater or equal to 38C (100.4F), Mild Pain (1 - 3)  albuterol    90 MICROgram(s) HFA Inhaler 2 Puff(s) Inhalation every 6 hours PRN Shortness of Breath  aluminum hydroxide/magnesium hydroxide/simethicone Suspension 30 milliLiter(s) Oral every 4 hours PRN Dyspepsia  haloperidol    Injectable 1 milliGRAM(s) IntraMuscular every 12 hours PRN Agitation  melatonin 3 milliGRAM(s) Oral at bedtime PRN Insomnia  ondansetron Injectable 4 milliGRAM(s) IV Push every 8 hours PRN Nausea and/or Vomiting  QUEtiapine 12.5 milliGRAM(s) Oral every 8 hours PRN agitation      Allergies    penicillin (Anaphylaxis)  Zithromax (Hives)    Intolerances        Vital Signs Last 24 Hrs  T(C): 36.7 (09 Nov 2023 09:05), Max: 36.7 (09 Nov 2023 09:05)  T(F): 98.1 (09 Nov 2023 09:05), Max: 98.1 (09 Nov 2023 09:05)  HR: 90 (09 Nov 2023 14:12) (67 - 108)  BP: 128/75 (09 Nov 2023 09:05) (122/76 - 128/75)  BP(mean): --  RR: 18 (09 Nov 2023 09:05) (17 - 18)  SpO2: 94% (09 Nov 2023 14:12) (94% - 100%)    Parameters below as of 09 Nov 2023 14:12  Patient On (Oxygen Delivery Method): room air          PHYSICAL EXAMINATION:    NECK:  Supple. No lymphadenopathy. Jugular venous pressure not elevated. Carotids equal.   HEART:   The cardiac impulse has a normal quality. Reg., Nl S1 and S2.  There are no murmurs, rubs or gallops noted  CHEST:  Chest rhonchi to auscultation. Normal respiratory effort.  ABDOMEN:  Soft and nontender.   EXTREMITIES:  There is no edema.       LABS:                        12.4   5.04  )-----------( 118      ( 09 Nov 2023 06:16 )             36.0     11-09    131<L>  |  98  |  26<H>  ----------------------------<  136<H>  3.9   |  24  |  0.75    Ca    9.4      09 Nov 2023 06:16  Phos  3.9     11-09  Mg     1.9     11-09    TPro  6.5  /  Alb  3.2<L>  /  TBili  0.7  /  DBili  x   /  AST  13<L>  /  ALT  14  /  AlkPhos  79  11-09      Urinalysis Basic - ( 09 Nov 2023 06:16 )    Color: x / Appearance: x / SG: x / pH: x  Gluc: 136 mg/dL / Ketone: x  / Bili: x / Urobili: x   Blood: x / Protein: x / Nitrite: x   Leuk Esterase: x / RBC: x / WBC x   Sq Epi: x / Non Sq Epi: x / Bacteria: x            
Subjective:    pat better, lying in bed, no new complaint.    Home Medications:  acetaminophen 325 mg oral tablet: 2 tab(s) orally every 6 hours as needed for  mild pain (08 Nov 2023 00:33)  aspirin 81 mg oral delayed release tablet: 1 tab(s) orally once a day (08 Nov 2023 00:42)  divalproex sodium 125 mg oral delayed release tablet: 1 tab(s) orally 2 times a day (08 Nov 2023 00:42)  losartan 100 mg oral tablet: 1 tab(s) orally once a day (08 Nov 2023 00:33)  metoprolol succinate 25 mg oral tablet, extended release: 1 tab(s) orally once a day (08 Nov 2023 00:33)    MEDICATIONS  (STANDING):  albuterol/ipratropium for Nebulization 3 milliLiter(s) Nebulizer every 6 hours  aspirin enteric coated 81 milliGRAM(s) Oral daily  budesonide 160 MICROgram(s)/formoterol 4.5 MICROgram(s) Inhaler 2 Puff(s) Inhalation two times a day  cefTRIAXone Injectable.      cefTRIAXone Injectable. 1000 milliGRAM(s) IV Push every 24 hours  divalproex  milliGRAM(s) Oral two times a day  losartan 100 milliGRAM(s) Oral daily  methylPREDNISolone sodium succinate Injectable 20 milliGRAM(s) IV Push every 8 hours  metoprolol succinate ER 25 milliGRAM(s) Oral daily  pantoprazole    Tablet 40 milliGRAM(s) Oral before breakfast    MEDICATIONS  (PRN):  acetaminophen     Tablet .. 650 milliGRAM(s) Oral every 6 hours PRN Temp greater or equal to 38C (100.4F), Mild Pain (1 - 3)  albuterol    90 MICROgram(s) HFA Inhaler 2 Puff(s) Inhalation every 6 hours PRN Shortness of Breath  aluminum hydroxide/magnesium hydroxide/simethicone Suspension 30 milliLiter(s) Oral every 4 hours PRN Dyspepsia  haloperidol    Injectable 1 milliGRAM(s) IntraMuscular every 12 hours PRN Agitation  melatonin 3 milliGRAM(s) Oral at bedtime PRN Insomnia  ondansetron Injectable 4 milliGRAM(s) IV Push every 8 hours PRN Nausea and/or Vomiting  QUEtiapine 12.5 milliGRAM(s) Oral every 8 hours PRN agitation      Allergies    penicillin (Anaphylaxis)  Zithromax (Hives)    Intolerances        Vital Signs Last 24 Hrs  T(C): 36.3 (10 Nov 2023 09:01), Max: 36.3 (10 Nov 2023 05:15)  T(F): 97.3 (10 Nov 2023 09:01), Max: 97.4 (10 Nov 2023 05:15)  HR: 67 (10 Nov 2023 09:01) (50 - 92)  BP: 124/60 (10 Nov 2023 09:01) (101/48 - 124/60)  BP(mean): --  RR: 18 (10 Nov 2023 09:01) (18 - 18)  SpO2: 97% (10 Nov 2023 09:01) (92% - 97%)    Parameters below as of 10 Nov 2023 09:01  Patient On (Oxygen Delivery Method): room air          PHYSICAL EXAMINATION:    NECK:  Supple. No lymphadenopathy. Jugular venous pressure not elevated. Carotids equal.   HEART:   The cardiac impulse has a normal quality. Reg., Nl S1 and S2.  There are no murmurs, rubs or gallops noted  CHEST:  Chest rhonchi to auscultation. Normal respiratory effort.  ABDOMEN:  Soft and nontender.   EXTREMITIES:  There is no edema.       LABS:                        12.7   5.87  )-----------( 123      ( 10 Nov 2023 06:12 )             37.2     11-10    134<L>  |  101  |  34<H>  ----------------------------<  131<H>  4.4   |  29  |  0.87    Ca    9.1      10 Nov 2023 06:12  Phos  3.9     11-10  Mg     2.0     11-10    TPro  6.6  /  Alb  3.1<L>  /  TBili  0.5  /  DBili  x   /  AST  14<L>  /  ALT  15  /  AlkPhos  75  11-10      Urinalysis Basic - ( 10 Nov 2023 06:12 )    Color: x / Appearance: x / SG: x / pH: x  Gluc: 131 mg/dL / Ketone: x  / Bili: x / Urobili: x   Blood: x / Protein: x / Nitrite: x   Leuk Esterase: x / RBC: x / WBC x   Sq Epi: x / Non Sq Epi: x / Bacteria: x            
Subjective:  Chief complain : cough, dyspnea  HPI:  86 y/o F with PMH A fib, dementia (A+Ox1 to 2), HTN, GERD, right hip fracture s/p closed reduction and percutaneous pinning of right hip on 3/29/23, severe MR, severe pulmonary HTN, 4+ TR  was sent from [facility]  on 11/8/23 with cough and Dyspnea. patient Denies any CHest pain , plapitations, pain, N/V/D. No the best historian. MOst of the history was obtained from the chart. Discussed plan with patient Niece and .    In the ER patient received IV solumedrol and Albuterol.      11/9 -    Patient seen and examined at bedside earlier today, + cough, + wheezing, denies cp, abdominal pain, poor historian but able to answer questions appropriately   11/10 - afebrile, denies cp, + cough, + wheezing, denies pain, tolerating po intake, plan discussed     Review of system- limited due to dementia, no other complains     Vital sings reviewed for last 24 h  T(C): 36.3 (11-10-23 @ 09:01), Max: 36.3 (11-10-23 @ 05:15)  T(F): 97.3 (11-10-23 @ 09:01), Max: 97.4 (11-10-23 @ 05:15)  HR: 67 (11-10-23 @ 09:01) (50 - 72)  BP: 124/60 (11-10-23 @ 09:01) (101/48 - 124/60)  RR: 18 (11-10-23 @ 09:01) (18 - 18)  SpO2: 97% (11-10-23 @ 09:01) (92% - 97%)    Physical exam:   General : NAD, appear to be of stated age , well groomed   NERVOUS SYSTEM:  Alert & Oriented X3, non- focal exam, Motor Strength 5/5 B/L upper and lower extremities; DTRs 2+ intact and symmetric  HEAD:  Atraumatic, Normocephalic  EYES: EOMI, PERRLA, conjunctiva and sclera clear  HEENT: Moist mucous membranes, Supple neck , No JVD  CHEST: BS decreased at bases  bilaterally; No rales, no rhonchi, +  wheezing  HEART: Regular rate and rhythm; No murmurs, no rubs or gallops  ABDOMEN: Soft, Non-tender, Non-distended; Bowel sounds present, no guarding , no peritoneal irritation   GENITOURINARY- Voiding, no suprapubic tenderness  EXTREMITIES:  2+ Peripheral Pulses, No clubbing, cyanosis,   edema  MUSCULOSKELETAL:- No muscle tenderness, Muscle tone normal, No joint tenderness, no Joint swelling,  Joint ROM –normal   SKIN-no rash, no lesion    Labs radiologic and other test : all reviewed and interpreted :                         12.7   5.87  )-----------( 123      ( 10 Nov 2023 06:12 )             37.2     11-10    134<L>  |  101  |  34<H>  ----------------------------<  131<H>  4.4   |  29  |  0.87    Ca    9.1      10 Nov 2023 06:12  Phos  3.9     11-10  Mg     2.0     11-10    TPro  6.6  /  Alb  3.1<L>  /  TBili  0.5  /  DBili  x   /  AST  14<L>  /  ALT  15  /  AlkPhos  75  11-10        LIVER FUNCTIONS - ( 10 Nov 2023 06:12 )  Alb: 3.1 g/dL / Pro: 6.6 gm/dL / ALK PHOS: 75 U/L / ALT: 15 U/L / AST: 14 U/L / GGT: x                   CT Chest No Cont (11.07.23 @ 22:03) >  IMPRESSION:  Previously seen scattered groundglass opacities are not present on the   prior study. No areas of new parenchymal consolidation. Bilateral   dependentatelectasis.    Additional findings as above.    Respiratory Viral Panel with COVID-19 by RYAN (11.07.23 @ 18:45)    Rapid RVP Result: Detected   SARS-CoV-2: NotDete:    Entero/Rhinovirus (RapRVP): Detected    Culture - Urine (11.07.23 @ 21:40)    Specimen Source: Clean Catch Clean Catch (Midstream)   Culture Results:   10,000 - 49,000 CFU/mL Escherichia coli      RECENT CULTURES:      Cardiac testing : reviewed   EKG     12 Lead ECG (11.07.23 @ 15:28) >  Ventricular Rate 79 BPM  QTC Calculation(Bazett) 440 ms  Atrial fibrillation with premature ventricular or aberrantly conducted complexes  Indeterminate axis  Septal infarct (cited on or before 27-MAR-2023)  Abnormal ECG  When compared with ECG of 02-APR-2023 14:00,  T wave inversion less evident in Anterior leads    Procedures :     Devices:     Current medications:  acetaminophen     Tablet .. 650 milliGRAM(s) Oral every 6 hours PRN  albuterol    90 MICROgram(s) HFA Inhaler 2 Puff(s) Inhalation every 6 hours PRN  albuterol/ipratropium for Nebulization 3 milliLiter(s) Nebulizer every 6 hours  aluminum hydroxide/magnesium hydroxide/simethicone Suspension 30 milliLiter(s) Oral every 4 hours PRN  aspirin enteric coated 81 milliGRAM(s) Oral daily  budesonide 160 MICROgram(s)/formoterol 4.5 MICROgram(s) Inhaler 2 Puff(s) Inhalation two times a day  divalproex  milliGRAM(s) Oral two times a day  haloperidol    Injectable 1 milliGRAM(s) IntraMuscular every 12 hours PRN  losartan 100 milliGRAM(s) Oral daily  melatonin 3 milliGRAM(s) Oral at bedtime PRN  methylPREDNISolone sodium succinate Injectable 20 milliGRAM(s) IV Push every 8 hours  metoprolol succinate ER 25 milliGRAM(s) Oral daily  ondansetron Injectable 4 milliGRAM(s) IV Push every 8 hours PRN  pantoprazole    Tablet 40 milliGRAM(s) Oral before breakfast  QUEtiapine 12.5 milliGRAM(s) Oral every 8 hours PRN            
Subjective:  Chief complain : cough, dyspnea  HPI:  88 y/o F with PMH A fib, dementia (A+Ox1 to 2), HTN, GERD, right hip fracture s/p closed reduction and percutaneous pinning of right hip on 3/29/23, severe MR, severe pulmonary HTN, 4+ TR  was sent from [facility]  on 11/8/23 with cough and Dyspnea. patient Denies any CHest pain , plapitations, pain, N/V/D. No the best historian. MOst of the history was obtained from the chart. Discussed plan with patient Niece and .    In the ER patient received IV solumedrol and Albuterol.      11/9 -    Patient seen and examined at bedside earlier today, + cough, + wheezing, denies cp, abdominal pain, poor historian but able to answer questions appropriately   11/10 - afebrile, denies cp, + cough, + wheezing, denies pain, tolerating po intake, plan discussed   11/11 - + cough, + urinary retention, + wheezing, denies cp, abdominal pain, poor historian   11/12 - confusion overnight s/p haldol inj , now very sedated, sleeping, denies cp, dyspnea, abdominal pain , + constipation, + urinary retention    Review of system- limited due to dementia, no other complains    Vital sings reviewed for last 24 h  T(C): 36.6 (11-12-23 @ 16:07), Max: 36.7 (11-12-23 @ 06:44)  T(F): 97.8 (11-12-23 @ 16:07), Max: 98.1 (11-12-23 @ 06:44)  HR: 90 (11-12-23 @ 16:07) (63 - 90)  BP: 137/77 (11-12-23 @ 16:07) (137/77 - 146/67)  RR: 18 (11-12-23 @ 16:07) (18 - 18)  SpO2: 95% (11-12-23 @ 16:07) (94% - 98%)  Wt(kg): --  Daily     Daily   CAPILLARY BLOOD GLUCOSE            Physical exam:   General : NAD, appear to be of stated age , well groomed   NERVOUS SYSTEM:  Alert & Oriented X3, non- focal exam, Motor Strength 5/5 B/L upper and lower extremities; DTRs 2+ intact and symmetric  HEAD:  Atraumatic, Normocephalic  EYES: EOMI, PERRLA, conjunctiva and sclera clear  HEENT: Moist mucous membranes, Supple neck , No JVD  CHEST: BS decreased at bases  bilaterally; No rales, no rhonchi, +  wheezing  HEART: Regular rate and rhythm; No murmurs, no rubs or gallops  ABDOMEN: Soft, Non-tender, Non-distended; Bowel sounds present, no guarding , no peritoneal irritation   GENITOURINARY- Voiding, no suprapubic tenderness  EXTREMITIES:  2+ Peripheral Pulses, No clubbing, cyanosis,   edema  MUSCULOSKELETAL:- No muscle tenderness, Muscle tone normal, No joint tenderness, no Joint swelling,  Joint ROM –normal   SKIN-no rash, no lesion    Labs radiologic and other test : all reviewed and interpreted :                         12.8   3.75  )-----------( 133      ( 12 Nov 2023 06:50 )             38.7     11-12    138  |  105  |  37<H>  ----------------------------<  135<H>  4.2   |  26  |  0.69    Ca    9.2      12 Nov 2023 06:50  Phos  3.4     11-12  Mg     2.2     11-12                              13.1   5.54  )-----------( 131      ( 11 Nov 2023 06:14 )             39.5     11-11    135  |  100  |  37<H>  ----------------------------<  123<H>  4.5   |  27  |  0.77    Ca    9.3      11 Nov 2023 06:14  Phos  3.6     11-11  Mg     2.1     11-11    TPro  6.6  /  Alb  3.1<L>  /  TBili  0.5  /  DBili  x   /  AST  14<L>  /  ALT  15  /  AlkPhos  75  11-10        LIVER FUNCTIONS - ( 10 Nov 2023 06:12 )  Alb: 3.1 g/dL / Pro: 6.6 gm/dL / ALK PHOS: 75 U/L / ALT: 15 U/L / AST: 14 U/L / GGT: x               CT Chest No Cont (11.07.23 @ 22:03) >  IMPRESSION:  Previously seen scattered groundglass opacities are not present on the   prior study. No areas of new parenchymal consolidation. Bilateral   dependentatelectasis.    Additional findings as above.    Respiratory Viral Panel with COVID-19 by RYAN (11.07.23 @ 18:45)    Rapid RVP Result: Detected   SARS-CoV-2: NotDetec:    Entero/Rhinovirus (RapRVP): Detected    Culture - Urine (11.07.23 @ 21:40)    Specimen Source: Clean Catch Clean Catch (Midstream)   Culture Results:   10,000 - 49,000 CFU/mL Escherichia coli  Culture - Urine (11.07.23 @ 21:40)    -  Amoxicillin/Clavulanic Acid: S <=8/4   -  Ampicillin: S <=8 These ampicillin results predict results for amoxicillin   -  Ampicillin/Sulbactam: S <=4/2   -  Aztreonam: S <=4   -  Cefazolin: S <=2 For uncomplicated UTI with K. pneumoniae, E. coli, or P. mirablis: TOSHA <=16 is sensitive and TOSHA >=32 is resistant. This also predicts results for oral agents cefaclor, cefdinir, cefpodoxime, cefprozil, cefuroxime axetil, cephalexin and locarbef for uncomplicated UTI. Note that some isolates may be susceptible to these agents while testing resistant to cefazolin.   -  Cefepime: S <=2   -  Cefoxitin: S <=8   -  Ceftriaxone: S <=1   -  Cefuroxime: S <=4   -  Ciprofloxacin: S <=0.25   -  Ertapenem: S <=0.5   -  Gentamicin: S <=2   -  Imipenem: S <=1   -  Levofloxacin: S <=0.5   -  Meropenem: S <=1   -  Nitrofurantoin: S <=32 Should not be used to treat pyelonephritis   -  Piperacillin/Tazobactam: S <=8   -  Tobramycin: S <=2   -  Trimethoprim/Sulfamethoxazole: S <=0.5/9.5   Specimen Source: Clean Catch Clean Catch (Midstream)   Culture Results:   10,000 - 49,000 CFU/mL Escherichia coli  <10,000 CFU/ml Normal Urogenital joan present   Organism Identification: Escherichia coli   Organism: Escherichia coli   Method Type: TOSHA          RECENT CULTURES:      Cardiac testing : reviewed   EKG     12 Lead ECG (11.07.23 @ 15:28) >  Ventricular Rate 79 BPM  QTC Calculation(Bazett) 440 ms  Atrial fibrillation with premature ventricular or aberrantly conducted complexes  Indeterminate axis  Septal infarct (cited on or before 27-MAR-2023)  Abnormal ECG  When compared with ECG of 02-APR-2023 14:00,  T wave inversion less evident in Anterior leads    Procedures :     Devices:     Current medications:  acetaminophen     Tablet .. 650 milliGRAM(s) Oral every 6 hours PRN  albuterol    90 MICROgram(s) HFA Inhaler 2 Puff(s) Inhalation every 6 hours PRN  albuterol/ipratropium for Nebulization 3 milliLiter(s) Nebulizer every 6 hours  aluminum hydroxide/magnesium hydroxide/simethicone Suspension 30 milliLiter(s) Oral every 4 hours PRN  aspirin enteric coated 81 milliGRAM(s) Oral daily  budesonide 160 MICROgram(s)/formoterol 4.5 MICROgram(s) Inhaler 2 Puff(s) Inhalation two times a day  divalproex  milliGRAM(s) Oral two times a day  haloperidol    Injectable 1 milliGRAM(s) IntraMuscular every 12 hours PRN  losartan 100 milliGRAM(s) Oral daily  melatonin 3 milliGRAM(s) Oral at bedtime PRN  methylPREDNISolone sodium succinate Injectable 20 milliGRAM(s) IV Push every 8 hours  metoprolol succinate ER 25 milliGRAM(s) Oral daily  ondansetron Injectable 4 milliGRAM(s) IV Push every 8 hours PRN  pantoprazole    Tablet 40 milliGRAM(s) Oral before breakfast  QUEtiapine 12.5 milliGRAM(s) Oral every 8 hours PRN            
Subjective:  Chief complain : cough, dyspnea  HPI:  88 y/o F with PMH A fib, dementia (A+Ox1 to 2), HTN, GERD, right hip fracture s/p closed reduction and percutaneous pinning of right hip on 3/29/23, severe MR, severe pulmonary HTN, 4+ TR  was sent from [facility]  on 11/8/23 with cough and Dyspnea. patient Denies any CHest pain , plapitations, pain, N/V/D. No the best historian. MOst of the history was obtained from the chart. Discussed plan with patient Niece and .    In the ER patient received IV solumedrol and Albuterol.      11/9 -    Patient seen and examined at bedside earlier today, + cough, + wheezing, denies cp, abdominal pain, poor historian but able to answer questions appropriately   11/10 - afebrile, denies cp, + cough, + wheezing, denies pain, tolerating po intake, plan discussed   11/11 - + cough, + urinary retention, + wheezing, denies cp, abdominal pain, poor historian     Review of system- limited due to dementia, no other complains    Vital sings reviewed for last 24 h  T(C): 36.4 (11-11-23 @ 10:06), Max: 36.4 (11-10-23 @ 21:23)  T(F): 97.5 (11-11-23 @ 10:06), Max: 97.5 (11-10-23 @ 21:23)  HR: 77 (11-11-23 @ 10:06) (77 - 89)  BP: 112/56 (11-11-23 @ 10:06) (111/54 - 112/56)  RR: 18 (11-11-23 @ 10:06) (18 - 18)  SpO2: 97% (11-11-23 @ 10:06) (95% - 97%)  Wt(kg): --  Daily     Daily   CAPILLARY BLOOD GLUCOSE          Physical exam:   General : NAD, appear to be of stated age , well groomed   NERVOUS SYSTEM:  Alert & Oriented X3, non- focal exam, Motor Strength 5/5 B/L upper and lower extremities; DTRs 2+ intact and symmetric  HEAD:  Atraumatic, Normocephalic  EYES: EOMI, PERRLA, conjunctiva and sclera clear  HEENT: Moist mucous membranes, Supple neck , No JVD  CHEST: BS decreased at bases  bilaterally; No rales, no rhonchi, +  wheezing  HEART: Regular rate and rhythm; No murmurs, no rubs or gallops  ABDOMEN: Soft, Non-tender, Non-distended; Bowel sounds present, no guarding , no peritoneal irritation   GENITOURINARY- Voiding, no suprapubic tenderness  EXTREMITIES:  2+ Peripheral Pulses, No clubbing, cyanosis,   edema  MUSCULOSKELETAL:- No muscle tenderness, Muscle tone normal, No joint tenderness, no Joint swelling,  Joint ROM –normal   SKIN-no rash, no lesion    Labs radiologic and other test : all reviewed and interpreted :                         13.1   5.54  )-----------( 131      ( 11 Nov 2023 06:14 )             39.5     11-11    135  |  100  |  37<H>  ----------------------------<  123<H>  4.5   |  27  |  0.77    Ca    9.3      11 Nov 2023 06:14  Phos  3.6     11-11  Mg     2.1     11-11    TPro  6.6  /  Alb  3.1<L>  /  TBili  0.5  /  DBili  x   /  AST  14<L>  /  ALT  15  /  AlkPhos  75  11-10        LIVER FUNCTIONS - ( 10 Nov 2023 06:12 )  Alb: 3.1 g/dL / Pro: 6.6 gm/dL / ALK PHOS: 75 U/L / ALT: 15 U/L / AST: 14 U/L / GGT: x               CT Chest No Cont (11.07.23 @ 22:03) >  IMPRESSION:  Previously seen scattered groundglass opacities are not present on the   prior study. No areas of new parenchymal consolidation. Bilateral   dependentatelectasis.    Additional findings as above.    Respiratory Viral Panel with COVID-19 by RYAN (11.07.23 @ 18:45)    Rapid RVP Result: Detected   SARS-CoV-2: NotDetec:    Entero/Rhinovirus (RapRVP): Detected    Culture - Urine (11.07.23 @ 21:40)    Specimen Source: Clean Catch Clean Catch (Midstream)   Culture Results:   10,000 - 49,000 CFU/mL Escherichia coli  Culture - Urine (11.07.23 @ 21:40)    -  Amoxicillin/Clavulanic Acid: S <=8/4   -  Ampicillin: S <=8 These ampicillin results predict results for amoxicillin   -  Ampicillin/Sulbactam: S <=4/2   -  Aztreonam: S <=4   -  Cefazolin: S <=2 For uncomplicated UTI with K. pneumoniae, E. coli, or P. mirablis: TOSHA <=16 is sensitive and TOSHA >=32 is resistant. This also predicts results for oral agents cefaclor, cefdinir, cefpodoxime, cefprozil, cefuroxime axetil, cephalexin and locarbef for uncomplicated UTI. Note that some isolates may be susceptible to these agents while testing resistant to cefazolin.   -  Cefepime: S <=2   -  Cefoxitin: S <=8   -  Ceftriaxone: S <=1   -  Cefuroxime: S <=4   -  Ciprofloxacin: S <=0.25   -  Ertapenem: S <=0.5   -  Gentamicin: S <=2   -  Imipenem: S <=1   -  Levofloxacin: S <=0.5   -  Meropenem: S <=1   -  Nitrofurantoin: S <=32 Should not be used to treat pyelonephritis   -  Piperacillin/Tazobactam: S <=8   -  Tobramycin: S <=2   -  Trimethoprim/Sulfamethoxazole: S <=0.5/9.5   Specimen Source: Clean Catch Clean Catch (Midstream)   Culture Results:   10,000 - 49,000 CFU/mL Escherichia coli  <10,000 CFU/ml Normal Urogenital joan present   Organism Identification: Escherichia coli   Organism: Escherichia coli   Method Type: TOSHA          RECENT CULTURES:      Cardiac testing : reviewed   EKG     12 Lead ECG (11.07.23 @ 15:28) >  Ventricular Rate 79 BPM  QTC Calculation(Bazett) 440 ms  Atrial fibrillation with premature ventricular or aberrantly conducted complexes  Indeterminate axis  Septal infarct (cited on or before 27-MAR-2023)  Abnormal ECG  When compared with ECG of 02-APR-2023 14:00,  T wave inversion less evident in Anterior leads    Procedures :     Devices:     Current medications:  acetaminophen     Tablet .. 650 milliGRAM(s) Oral every 6 hours PRN  albuterol    90 MICROgram(s) HFA Inhaler 2 Puff(s) Inhalation every 6 hours PRN  albuterol/ipratropium for Nebulization 3 milliLiter(s) Nebulizer every 6 hours  aluminum hydroxide/magnesium hydroxide/simethicone Suspension 30 milliLiter(s) Oral every 4 hours PRN  aspirin enteric coated 81 milliGRAM(s) Oral daily  budesonide 160 MICROgram(s)/formoterol 4.5 MICROgram(s) Inhaler 2 Puff(s) Inhalation two times a day  divalproex  milliGRAM(s) Oral two times a day  haloperidol    Injectable 1 milliGRAM(s) IntraMuscular every 12 hours PRN  losartan 100 milliGRAM(s) Oral daily  melatonin 3 milliGRAM(s) Oral at bedtime PRN  methylPREDNISolone sodium succinate Injectable 20 milliGRAM(s) IV Push every 8 hours  metoprolol succinate ER 25 milliGRAM(s) Oral daily  ondansetron Injectable 4 milliGRAM(s) IV Push every 8 hours PRN  pantoprazole    Tablet 40 milliGRAM(s) Oral before breakfast  QUEtiapine 12.5 milliGRAM(s) Oral every 8 hours PRN

## (undated) DEVICE — DRAPE TOWEL BLUE 17" X 24"

## (undated) DEVICE — DRSG WEBRIL 6"

## (undated) DEVICE — POSITIONER FOAM EGG CRATE ULNAR 2PCS (PINK)

## (undated) DEVICE — BLADE SCALPEL SAFETYLOCK #15

## (undated) DEVICE — SUT POLYSORB 1 36" GS-21 UNDYED

## (undated) DEVICE — DRILL BIT STRYKER ORTHO TRAUMA 3.2X300MM

## (undated) DEVICE — SPECIMEN CONTAINER 100ML

## (undated) DEVICE — GLV 9 DURAPRENE

## (undated) DEVICE — GLV 7.5 PROTEXIS (WHITE)

## (undated) DEVICE — BLADE SCALPEL SAFETYLOCK #10

## (undated) DEVICE — STAPLER SKIN VISI-STAT 35 WIDE

## (undated) DEVICE — DRILL BIT STRYKER ORTHO TRAUMA 4.9MM

## (undated) DEVICE — DRILL TAP 6.5MM

## (undated) DEVICE — VENODYNE/SCD SLEEVE CALF LARGE

## (undated) DEVICE — PACK HIP PINNING

## (undated) DEVICE — FOLEY TRAY 16FR 5CC LTX UMETER CLOSED

## (undated) DEVICE — DRILL BIT STRYKER ORTHO TRAUMA 8MM

## (undated) DEVICE — DRILL BIT STRYKER ORTHO TRAUMA 6.5MM

## (undated) DEVICE — GLV 7 PROTEXIS (WHITE)

## (undated) DEVICE — MARKING PEN W RULER

## (undated) DEVICE — DRILL BIT STRYKER ORTHO TRAUMA 5.6MM

## (undated) DEVICE — DRSG ACE BANDAGE 6"

## (undated) DEVICE — PREP CHLORAPREP HI-LITE ORANGE 26ML

## (undated) DEVICE — SOL IRR POUR NS 0.9% 500ML

## (undated) DEVICE — LAP PAD 18 X 18"

## (undated) DEVICE — MEDICATION LABELS W MARKER

## (undated) DEVICE — GLV 8.5 PROTEXIS (WHITE)

## (undated) DEVICE — DRSG TAPE MICROFOAM 3"

## (undated) DEVICE — SUT POLYSORB 0 30" GS-21 UNDYED

## (undated) DEVICE — SUT POLYSORB 2-0 30" GS-21 UNDYED

## (undated) DEVICE — GLV 8 PROTEXIS (WHITE)

## (undated) DEVICE — SUCTION YANKAUER NO CONTROL VENT

## (undated) DEVICE — TAPE SILK 3"

## (undated) DEVICE — WARMING BLANKET UPPER ADULT

## (undated) DEVICE — GLV 6.5 PROTEXIS (WHITE)

## (undated) DEVICE — GOWN TRIMAX LG

## (undated) DEVICE — DRAPE MAYO STAND 30"

## (undated) DEVICE — SOL IRR POUR H2O 250ML